# Patient Record
Sex: FEMALE | Race: WHITE | Employment: OTHER | URBAN - METROPOLITAN AREA
[De-identification: names, ages, dates, MRNs, and addresses within clinical notes are randomized per-mention and may not be internally consistent; named-entity substitution may affect disease eponyms.]

---

## 2021-01-01 ENCOUNTER — OFFICE VISIT (OUTPATIENT)
Dept: CARDIOLOGY CLINIC | Age: 71
End: 2021-01-01
Payer: MEDICARE

## 2021-01-01 ENCOUNTER — HOSPITAL ENCOUNTER (INPATIENT)
Age: 71
LOS: 3 days | Discharge: HOME | DRG: 065 | End: 2021-07-07
Attending: EMERGENCY MEDICINE | Admitting: HOSPITALIST
Payer: MEDICARE

## 2021-01-01 ENCOUNTER — APPOINTMENT (OUTPATIENT)
Dept: MRI IMAGING | Age: 71
DRG: 065 | End: 2021-01-01
Payer: MEDICARE

## 2021-01-01 ENCOUNTER — PROCEDURE VISIT (OUTPATIENT)
Dept: CARDIOLOGY CLINIC | Age: 71
End: 2021-01-01

## 2021-01-01 ENCOUNTER — TELEPHONE (OUTPATIENT)
Dept: CARDIOLOGY CLINIC | Age: 71
End: 2021-01-01

## 2021-01-01 ENCOUNTER — APPOINTMENT (OUTPATIENT)
Dept: CT IMAGING | Age: 71
DRG: 065 | End: 2021-01-01
Payer: MEDICARE

## 2021-01-01 ENCOUNTER — APPOINTMENT (OUTPATIENT)
Dept: GENERAL RADIOLOGY | Age: 71
DRG: 065 | End: 2021-01-01
Payer: MEDICARE

## 2021-01-01 ENCOUNTER — HOSPITAL ENCOUNTER (OUTPATIENT)
Age: 71
Discharge: HOME | DRG: 065 | End: 2021-07-02
Payer: MEDICARE

## 2021-01-01 VITALS
DIASTOLIC BLOOD PRESSURE: 68 MMHG | HEIGHT: 67 IN | SYSTOLIC BLOOD PRESSURE: 120 MMHG | WEIGHT: 212 LBS | BODY MASS INDEX: 33.27 KG/M2 | HEART RATE: 61 BPM

## 2021-01-01 VITALS
SYSTOLIC BLOOD PRESSURE: 140 MMHG | HEART RATE: 60 BPM | HEIGHT: 67 IN | DIASTOLIC BLOOD PRESSURE: 84 MMHG | WEIGHT: 212.6 LBS | BODY MASS INDEX: 33.37 KG/M2

## 2021-01-01 VITALS
DIASTOLIC BLOOD PRESSURE: 72 MMHG | WEIGHT: 219.7 LBS | BODY MASS INDEX: 34.48 KG/M2 | OXYGEN SATURATION: 97 % | SYSTOLIC BLOOD PRESSURE: 166 MMHG | RESPIRATION RATE: 16 BRPM | HEART RATE: 60 BPM | TEMPERATURE: 98.4 F | HEIGHT: 67 IN

## 2021-01-01 DIAGNOSIS — I10 ESSENTIAL HYPERTENSION: ICD-10-CM

## 2021-01-01 DIAGNOSIS — I48.19 PERSISTENT ATRIAL FIBRILLATION (HCC): Primary | ICD-10-CM

## 2021-01-01 DIAGNOSIS — I47.1 ATRIOVENTRICULAR NODAL RE-ENTRY TACHYCARDIA (HCC): ICD-10-CM

## 2021-01-01 DIAGNOSIS — I34.1 MITRAL VALVE PROLAPSE: ICD-10-CM

## 2021-01-01 DIAGNOSIS — Z79.01 ANTICOAGULATED ON COUMADIN: ICD-10-CM

## 2021-01-01 DIAGNOSIS — Z95.2 HISTORY OF MITRAL VALVE PROSTHESIS: ICD-10-CM

## 2021-01-01 DIAGNOSIS — I38 VHD (VALVULAR HEART DISEASE): ICD-10-CM

## 2021-01-01 DIAGNOSIS — Z98.890 H/O PRIOR ABLATION TREATMENT: ICD-10-CM

## 2021-01-01 DIAGNOSIS — Z86.73 H/O: CVA (CEREBROVASCULAR ACCIDENT): ICD-10-CM

## 2021-01-01 DIAGNOSIS — Z01.818 PRE-OP EXAMINATION: Primary | ICD-10-CM

## 2021-01-01 DIAGNOSIS — R47.81 SLURRED SPEECH: Primary | ICD-10-CM

## 2021-01-01 DIAGNOSIS — I63.9 ACUTE CVA (CEREBROVASCULAR ACCIDENT) (HCC): ICD-10-CM

## 2021-01-01 DIAGNOSIS — Z01.818 PRE-OP EXAMINATION: ICD-10-CM

## 2021-01-01 DIAGNOSIS — I48.92 ATRIAL FIBRILLATION AND FLUTTER (HCC): ICD-10-CM

## 2021-01-01 DIAGNOSIS — I48.20 CHRONIC ATRIAL FIBRILLATION (HCC): ICD-10-CM

## 2021-01-01 DIAGNOSIS — Z95.2 HISTORY OF MITRAL VALVE PROSTHESIS: Primary | ICD-10-CM

## 2021-01-01 DIAGNOSIS — I48.91 ATRIAL FIBRILLATION AND FLUTTER (HCC): ICD-10-CM

## 2021-01-01 LAB
ALBUMIN SERPL-MCNC: 3.2 GM/DL (ref 3.4–5)
ALP BLD-CCNC: 63 IU/L (ref 40–129)
ALT SERPL-CCNC: 8 U/L (ref 10–40)
ANION GAP SERPL CALCULATED.3IONS-SCNC: 7 MMOL/L (ref 4–16)
ANION GAP SERPL CALCULATED.3IONS-SCNC: 7 MMOL/L (ref 4–16)
AST SERPL-CCNC: 17 IU/L (ref 15–37)
BACTERIA: ABNORMAL /HPF
BASOPHILS ABSOLUTE: 0.1 K/CU MM
BASOPHILS RELATIVE PERCENT: 0.8 % (ref 0–1)
BILIRUB SERPL-MCNC: 0.3 MG/DL (ref 0–1)
BILIRUBIN URINE: NEGATIVE MG/DL
BLOOD, URINE: ABNORMAL
BUN BLDV-MCNC: 11 MG/DL (ref 6–23)
BUN BLDV-MCNC: 7 MG/DL (ref 6–23)
CALCIUM SERPL-MCNC: 8.6 MG/DL (ref 8.3–10.6)
CALCIUM SERPL-MCNC: 9 MG/DL (ref 8.3–10.6)
CHLORIDE BLD-SCNC: 103 MMOL/L (ref 99–110)
CHLORIDE BLD-SCNC: 105 MMOL/L (ref 99–110)
CHOLESTEROL: 172 MG/DL
CHP ED QC CHECK: NORMAL
CLARITY: CLEAR
CO2: 26 MMOL/L (ref 21–32)
CO2: 28 MMOL/L (ref 21–32)
COLOR: YELLOW
CREAT SERPL-MCNC: 0.6 MG/DL (ref 0.6–1.1)
CREAT SERPL-MCNC: 0.7 MG/DL (ref 0.6–1.1)
DIFFERENTIAL TYPE: ABNORMAL
EKG ATRIAL RATE: 45 BPM
EKG DIAGNOSIS: NORMAL
EKG Q-T INTERVAL: 434 MS
EKG QRS DURATION: 102 MS
EKG QTC CALCULATION (BAZETT): 440 MS
EKG R AXIS: 44 DEGREES
EKG T AXIS: 183 DEGREES
EKG VENTRICULAR RATE: 62 BPM
EOSINOPHILS ABSOLUTE: 0.2 K/CU MM
EOSINOPHILS RELATIVE PERCENT: 3.3 % (ref 0–3)
ESTIMATED AVERAGE GLUCOSE: 137 MG/DL
GFR AFRICAN AMERICAN: >60 ML/MIN/1.73M2
GFR AFRICAN AMERICAN: >60 ML/MIN/1.73M2
GFR NON-AFRICAN AMERICAN: >60 ML/MIN/1.73M2
GFR NON-AFRICAN AMERICAN: >60 ML/MIN/1.73M2
GLUCOSE BLD-MCNC: 115 MG/DL (ref 70–99)
GLUCOSE BLD-MCNC: 119 MG/DL (ref 70–99)
GLUCOSE BLD-MCNC: 123 MG/DL
GLUCOSE BLD-MCNC: 123 MG/DL (ref 70–99)
GLUCOSE, URINE: NEGATIVE MG/DL
HBA1C MFR BLD: 6.4 % (ref 4.2–6.3)
HCT VFR BLD CALC: 33.8 % (ref 37–47)
HCT VFR BLD CALC: 34.8 % (ref 37–47)
HDLC SERPL-MCNC: 51 MG/DL
HEMOGLOBIN: 10.8 GM/DL (ref 12.5–16)
HEMOGLOBIN: 11.2 GM/DL (ref 12.5–16)
IMMATURE NEUTROPHIL %: 0.6 % (ref 0–0.43)
INR BLD: 0.95 INDEX
INR BLD: 1.08 INDEX
INR BLD: 1.15 INDEX
INR BLD: 1.46 INDEX
INR BLD: 1.54 INDEX
KETONES, URINE: NEGATIVE MG/DL
LDL CHOLESTEROL DIRECT: 110 MG/DL
LEUKOCYTE ESTERASE, URINE: ABNORMAL
LV EF: 53 %
LVEF MODALITY: NORMAL
LYMPHOCYTES ABSOLUTE: 1.7 K/CU MM
LYMPHOCYTES RELATIVE PERCENT: 23.3 % (ref 24–44)
MCH RBC QN AUTO: 28.1 PG (ref 27–31)
MCH RBC QN AUTO: 28.6 PG (ref 27–31)
MCHC RBC AUTO-ENTMCNC: 32 % (ref 32–36)
MCHC RBC AUTO-ENTMCNC: 32.2 % (ref 32–36)
MCV RBC AUTO: 87.4 FL (ref 78–100)
MCV RBC AUTO: 89.4 FL (ref 78–100)
MONOCYTES ABSOLUTE: 0.8 K/CU MM
MONOCYTES RELATIVE PERCENT: 11.3 % (ref 0–4)
NITRITE URINE, QUANTITATIVE: NEGATIVE
NUCLEATED RBC %: 0 %
PDW BLD-RTO: 14.3 % (ref 11.7–14.9)
PDW BLD-RTO: 14.4 % (ref 11.7–14.9)
PH, URINE: 8 (ref 5–8)
PLATELET # BLD: 201 K/CU MM (ref 140–440)
PLATELET # BLD: 205 K/CU MM (ref 140–440)
PMV BLD AUTO: 9.2 FL (ref 7.5–11.1)
PMV BLD AUTO: 9.4 FL (ref 7.5–11.1)
POTASSIUM SERPL-SCNC: 3.6 MMOL/L (ref 3.5–5.1)
POTASSIUM SERPL-SCNC: 3.7 MMOL/L (ref 3.5–5.1)
PROTEIN UA: NEGATIVE MG/DL
PROTHROMBIN TIME: 12.2 SECONDS (ref 11.7–14.5)
PROTHROMBIN TIME: 13.9 SECONDS (ref 11.7–14.5)
PROTHROMBIN TIME: 14.9 SECONDS (ref 11.7–14.5)
PROTHROMBIN TIME: 18.9 SECONDS (ref 11.7–14.5)
PROTHROMBIN TIME: 20 SECONDS (ref 11.7–14.5)
RBC # BLD: 3.78 M/CU MM (ref 4.2–5.4)
RBC # BLD: 3.98 M/CU MM (ref 4.2–5.4)
RBC URINE: 8 /HPF (ref 0–6)
SEGMENTED NEUTROPHILS ABSOLUTE COUNT: 4.4 K/CU MM
SEGMENTED NEUTROPHILS RELATIVE PERCENT: 60.7 % (ref 36–66)
SODIUM BLD-SCNC: 136 MMOL/L (ref 135–145)
SODIUM BLD-SCNC: 140 MMOL/L (ref 135–145)
SPECIFIC GRAVITY UA: 1 (ref 1–1.03)
SQUAMOUS EPITHELIAL: 3 /HPF
TOTAL IMMATURE NEUTOROPHIL: 0.04 K/CU MM
TOTAL NUCLEATED RBC: 0 K/CU MM
TOTAL PROTEIN: 5.7 GM/DL (ref 6.4–8.2)
TRANSITIONAL EPITHELIAL: <1 /HPF
TRICHOMONAS: ABNORMAL /HPF
TRIGL SERPL-MCNC: 109 MG/DL
TROPONIN T: <0.01 NG/ML
UROBILINOGEN, URINE: NEGATIVE MG/DL (ref 0.2–1)
WBC # BLD: 7.2 K/CU MM (ref 4–10.5)
WBC # BLD: 7.2 K/CU MM (ref 4–10.5)
WBC UA: 4 /HPF (ref 0–5)

## 2021-01-01 PROCEDURE — 6370000000 HC RX 637 (ALT 250 FOR IP): Performed by: HOSPITALIST

## 2021-01-01 PROCEDURE — 6370000000 HC RX 637 (ALT 250 FOR IP): Performed by: INTERNAL MEDICINE

## 2021-01-01 PROCEDURE — 70551 MRI BRAIN STEM W/O DYE: CPT

## 2021-01-01 PROCEDURE — 70498 CT ANGIOGRAPHY NECK: CPT

## 2021-01-01 PROCEDURE — 2580000003 HC RX 258: Performed by: HOSPITALIST

## 2021-01-01 PROCEDURE — 1200000000 HC SEMI PRIVATE

## 2021-01-01 PROCEDURE — 2580000003 HC RX 258: Performed by: EMERGENCY MEDICINE

## 2021-01-01 PROCEDURE — 99223 1ST HOSP IP/OBS HIGH 75: CPT | Performed by: INTERNAL MEDICINE

## 2021-01-01 PROCEDURE — 36415 COLL VENOUS BLD VENIPUNCTURE: CPT

## 2021-01-01 PROCEDURE — 92610 EVALUATE SWALLOWING FUNCTION: CPT | Performed by: SPEECH-LANGUAGE PATHOLOGIST

## 2021-01-01 PROCEDURE — 97116 GAIT TRAINING THERAPY: CPT

## 2021-01-01 PROCEDURE — 6370000000 HC RX 637 (ALT 250 FOR IP): Performed by: NURSE PRACTITIONER

## 2021-01-01 PROCEDURE — 71045 X-RAY EXAM CHEST 1 VIEW: CPT

## 2021-01-01 PROCEDURE — 80061 LIPID PANEL: CPT

## 2021-01-01 PROCEDURE — 85610 PROTHROMBIN TIME: CPT

## 2021-01-01 PROCEDURE — 84484 ASSAY OF TROPONIN QUANT: CPT

## 2021-01-01 PROCEDURE — 70496 CT ANGIOGRAPHY HEAD: CPT

## 2021-01-01 PROCEDURE — 99233 SBSQ HOSP IP/OBS HIGH 50: CPT | Performed by: INTERNAL MEDICINE

## 2021-01-01 PROCEDURE — 93306 TTE W/DOPPLER COMPLETE: CPT | Performed by: INTERNAL MEDICINE

## 2021-01-01 PROCEDURE — 97530 THERAPEUTIC ACTIVITIES: CPT

## 2021-01-01 PROCEDURE — 97162 PT EVAL MOD COMPLEX 30 MIN: CPT

## 2021-01-01 PROCEDURE — 99223 1ST HOSP IP/OBS HIGH 75: CPT | Performed by: PSYCHIATRY & NEUROLOGY

## 2021-01-01 PROCEDURE — 85025 COMPLETE CBC W/AUTO DIFF WBC: CPT

## 2021-01-01 PROCEDURE — 6360000002 HC RX W HCPCS: Performed by: HOSPITALIST

## 2021-01-01 PROCEDURE — 93308 TTE F-UP OR LMTD: CPT

## 2021-01-01 PROCEDURE — 80048 BASIC METABOLIC PNL TOTAL CA: CPT

## 2021-01-01 PROCEDURE — APPSS45 APP SPLIT SHARED TIME 31-45 MINUTES: Performed by: NURSE PRACTITIONER

## 2021-01-01 PROCEDURE — 85027 COMPLETE CBC AUTOMATED: CPT

## 2021-01-01 PROCEDURE — 80053 COMPREHEN METABOLIC PANEL: CPT

## 2021-01-01 PROCEDURE — 6360000004 HC RX CONTRAST MEDICATION: Performed by: EMERGENCY MEDICINE

## 2021-01-01 PROCEDURE — 99232 SBSQ HOSP IP/OBS MODERATE 35: CPT | Performed by: INTERNAL MEDICINE

## 2021-01-01 PROCEDURE — 99204 OFFICE O/P NEW MOD 45 MIN: CPT | Performed by: INTERNAL MEDICINE

## 2021-01-01 PROCEDURE — 81001 URINALYSIS AUTO W/SCOPE: CPT

## 2021-01-01 PROCEDURE — 94761 N-INVAS EAR/PLS OXIMETRY MLT: CPT

## 2021-01-01 PROCEDURE — 99214 OFFICE O/P EST MOD 30 MIN: CPT | Performed by: INTERNAL MEDICINE

## 2021-01-01 PROCEDURE — 82962 GLUCOSE BLOOD TEST: CPT

## 2021-01-01 PROCEDURE — 97535 SELF CARE MNGMENT TRAINING: CPT

## 2021-01-01 PROCEDURE — 93010 ELECTROCARDIOGRAM REPORT: CPT | Performed by: INTERNAL MEDICINE

## 2021-01-01 PROCEDURE — 83036 HEMOGLOBIN GLYCOSYLATED A1C: CPT

## 2021-01-01 PROCEDURE — 93000 ELECTROCARDIOGRAM COMPLETE: CPT | Performed by: INTERNAL MEDICINE

## 2021-01-01 PROCEDURE — 97165 OT EVAL LOW COMPLEX 30 MIN: CPT

## 2021-01-01 PROCEDURE — 83721 ASSAY OF BLOOD LIPOPROTEIN: CPT

## 2021-01-01 PROCEDURE — 93005 ELECTROCARDIOGRAM TRACING: CPT | Performed by: EMERGENCY MEDICINE

## 2021-01-01 PROCEDURE — 70450 CT HEAD/BRAIN W/O DYE: CPT

## 2021-01-01 PROCEDURE — 99285 EMERGENCY DEPT VISIT HI MDM: CPT

## 2021-01-01 RX ORDER — AMLODIPINE BESYLATE 10 MG/1
10 TABLET ORAL DAILY
Qty: 30 TABLET | Refills: 0 | Status: SHIPPED | OUTPATIENT
Start: 2021-01-01 | End: 2021-01-01 | Stop reason: ALTCHOICE

## 2021-01-01 RX ORDER — ONDANSETRON 2 MG/ML
4 INJECTION INTRAMUSCULAR; INTRAVENOUS EVERY 6 HOURS PRN
Status: DISCONTINUED | OUTPATIENT
Start: 2021-01-01 | End: 2021-01-01 | Stop reason: HOSPADM

## 2021-01-01 RX ORDER — WARFARIN SODIUM 2 MG/1
2 TABLET ORAL DAILY
Status: DISCONTINUED | OUTPATIENT
Start: 2021-01-01 | End: 2021-01-01 | Stop reason: HOSPADM

## 2021-01-01 RX ORDER — ASPIRIN 81 MG/1
81 TABLET, CHEWABLE ORAL DAILY
Status: DISCONTINUED | OUTPATIENT
Start: 2021-01-01 | End: 2021-01-01 | Stop reason: HOSPADM

## 2021-01-01 RX ORDER — SODIUM CHLORIDE 0.9 % (FLUSH) 0.9 %
5-40 SYRINGE (ML) INJECTION PRN
Status: DISCONTINUED | OUTPATIENT
Start: 2021-01-01 | End: 2021-01-01 | Stop reason: HOSPADM

## 2021-01-01 RX ORDER — DOCUSATE SODIUM 100 MG/1
100 CAPSULE, LIQUID FILLED ORAL NIGHTLY
Status: DISCONTINUED | OUTPATIENT
Start: 2021-01-01 | End: 2021-01-01 | Stop reason: HOSPADM

## 2021-01-01 RX ORDER — ONDANSETRON 4 MG/1
4 TABLET, ORALLY DISINTEGRATING ORAL EVERY 8 HOURS PRN
Status: DISCONTINUED | OUTPATIENT
Start: 2021-01-01 | End: 2021-01-01 | Stop reason: HOSPADM

## 2021-01-01 RX ORDER — POLYETHYLENE GLYCOL 3350 17 G/17G
17 POWDER, FOR SOLUTION ORAL DAILY PRN
Status: DISCONTINUED | OUTPATIENT
Start: 2021-01-01 | End: 2021-01-01 | Stop reason: HOSPADM

## 2021-01-01 RX ORDER — HYDROCODONE BITARTRATE AND ACETAMINOPHEN 5; 325 MG/1; MG/1
TABLET ORAL
COMMUNITY
Start: 2021-01-01

## 2021-01-01 RX ORDER — HYDROCHLOROTHIAZIDE 25 MG/1
25 TABLET ORAL DAILY
Qty: 30 TABLET | Refills: 0 | Status: SHIPPED | OUTPATIENT
Start: 2021-01-01 | End: 2021-01-01 | Stop reason: ALTCHOICE

## 2021-01-01 RX ORDER — CITALOPRAM 20 MG/1
20 TABLET ORAL DAILY
Status: DISCONTINUED | OUTPATIENT
Start: 2021-01-01 | End: 2021-01-01 | Stop reason: HOSPADM

## 2021-01-01 RX ORDER — LANOLIN ALCOHOL/MO/W.PET/CERES
2000 CREAM (GRAM) TOPICAL DAILY
Status: DISCONTINUED | OUTPATIENT
Start: 2021-01-01 | End: 2021-01-01 | Stop reason: HOSPADM

## 2021-01-01 RX ORDER — METOPROLOL TARTRATE 50 MG/1
50 TABLET, FILM COATED ORAL 3 TIMES DAILY
Status: DISCONTINUED | OUTPATIENT
Start: 2021-01-01 | End: 2021-01-01 | Stop reason: DRUGHIGH

## 2021-01-01 RX ORDER — SODIUM CHLORIDE 0.9 % (FLUSH) 0.9 %
5-40 SYRINGE (ML) INJECTION EVERY 12 HOURS SCHEDULED
Status: DISCONTINUED | OUTPATIENT
Start: 2021-01-01 | End: 2021-01-01 | Stop reason: HOSPADM

## 2021-01-01 RX ORDER — WARFARIN SODIUM 3 MG/1
3 TABLET ORAL ONCE
Status: COMPLETED | OUTPATIENT
Start: 2021-01-01 | End: 2021-01-01

## 2021-01-01 RX ORDER — OXYCODONE HYDROCHLORIDE AND ACETAMINOPHEN 5; 325 MG/1; MG/1
1 TABLET ORAL EVERY 6 HOURS PRN
Status: DISCONTINUED | OUTPATIENT
Start: 2021-01-01 | End: 2021-01-01 | Stop reason: HOSPADM

## 2021-01-01 RX ORDER — AMLODIPINE BESYLATE 5 MG/1
5 TABLET ORAL DAILY
Qty: 30 TABLET | Refills: 5 | Status: SHIPPED | OUTPATIENT
Start: 2021-01-01

## 2021-01-01 RX ORDER — SODIUM CHLORIDE 9 MG/ML
25 INJECTION, SOLUTION INTRAVENOUS PRN
Status: DISCONTINUED | OUTPATIENT
Start: 2021-01-01 | End: 2021-01-01 | Stop reason: HOSPADM

## 2021-01-01 RX ORDER — LABETALOL HYDROCHLORIDE 5 MG/ML
10 INJECTION, SOLUTION INTRAVENOUS EVERY 10 MIN PRN
Status: DISCONTINUED | OUTPATIENT
Start: 2021-01-01 | End: 2021-01-01 | Stop reason: HOSPADM

## 2021-01-01 RX ORDER — ACETAMINOPHEN 325 MG/1
650 TABLET ORAL EVERY 4 HOURS PRN
Status: DISCONTINUED | OUTPATIENT
Start: 2021-01-01 | End: 2021-01-01 | Stop reason: HOSPADM

## 2021-01-01 RX ORDER — 0.9 % SODIUM CHLORIDE 0.9 %
10 VIAL (ML) INJECTION
Status: COMPLETED | OUTPATIENT
Start: 2021-01-01 | End: 2021-01-01

## 2021-01-01 RX ORDER — METOPROLOL TARTRATE 50 MG/1
50 TABLET, FILM COATED ORAL 2 TIMES DAILY
Qty: 60 TABLET | Refills: 3 | Status: SHIPPED | OUTPATIENT
Start: 2021-01-01

## 2021-01-01 RX ORDER — HYDROCODONE BITARTRATE AND ACETAMINOPHEN 5; 325 MG/1; MG/1
1 TABLET ORAL EVERY 6 HOURS PRN
Status: DISCONTINUED | OUTPATIENT
Start: 2021-01-01 | End: 2021-01-01

## 2021-01-01 RX ORDER — HYDROCHLOROTHIAZIDE 25 MG/1
25 TABLET ORAL DAILY
Status: DISCONTINUED | OUTPATIENT
Start: 2021-01-01 | End: 2021-01-01 | Stop reason: HOSPADM

## 2021-01-01 RX ADMIN — SODIUM CHLORIDE, PRESERVATIVE FREE 10 ML: 5 INJECTION INTRAVENOUS at 08:56

## 2021-01-01 RX ADMIN — ENOXAPARIN SODIUM 100 MG: 100 INJECTION SUBCUTANEOUS at 21:03

## 2021-01-01 RX ADMIN — SODIUM CHLORIDE, PRESERVATIVE FREE 10 ML: 5 INJECTION INTRAVENOUS at 08:45

## 2021-01-01 RX ADMIN — OXYCODONE HYDROCHLORIDE AND ACETAMINOPHEN 1 TABLET: 5; 325 TABLET ORAL at 03:06

## 2021-01-01 RX ADMIN — OXYCODONE HYDROCHLORIDE AND ACETAMINOPHEN 1 TABLET: 5; 325 TABLET ORAL at 21:03

## 2021-01-01 RX ADMIN — ENOXAPARIN SODIUM 100 MG: 100 INJECTION SUBCUTANEOUS at 20:54

## 2021-01-01 RX ADMIN — METOPROLOL TARTRATE 75 MG: 25 TABLET, FILM COATED ORAL at 12:39

## 2021-01-01 RX ADMIN — ENOXAPARIN SODIUM 100 MG: 100 INJECTION SUBCUTANEOUS at 08:54

## 2021-01-01 RX ADMIN — METOPROLOL TARTRATE 75 MG: 25 TABLET, FILM COATED ORAL at 21:03

## 2021-01-01 RX ADMIN — CYANOCOBALAMIN TAB 1000 MCG 2000 MCG: 1000 TAB at 09:12

## 2021-01-01 RX ADMIN — SODIUM CHLORIDE, PRESERVATIVE FREE 10 ML: 5 INJECTION INTRAVENOUS at 09:13

## 2021-01-01 RX ADMIN — DOCUSATE SODIUM 100 MG: 100 CAPSULE, LIQUID FILLED ORAL at 21:03

## 2021-01-01 RX ADMIN — CITALOPRAM HYDROBROMIDE 20 MG: 20 TABLET ORAL at 08:53

## 2021-01-01 RX ADMIN — ENOXAPARIN SODIUM 100 MG: 100 INJECTION SUBCUTANEOUS at 20:19

## 2021-01-01 RX ADMIN — CYANOCOBALAMIN TAB 1000 MCG 2000 MCG: 1000 TAB at 08:53

## 2021-01-01 RX ADMIN — OXYCODONE HYDROCHLORIDE AND ACETAMINOPHEN 1 TABLET: 5; 325 TABLET ORAL at 08:42

## 2021-01-01 RX ADMIN — HYDROCHLOROTHIAZIDE 25 MG: 25 TABLET ORAL at 09:12

## 2021-01-01 RX ADMIN — OXYCODONE HYDROCHLORIDE AND ACETAMINOPHEN 1 TABLET: 5; 325 TABLET ORAL at 00:19

## 2021-01-01 RX ADMIN — WARFARIN SODIUM 2 MG: 2 TABLET ORAL at 18:34

## 2021-01-01 RX ADMIN — SODIUM CHLORIDE, PRESERVATIVE FREE 10 ML: 5 INJECTION INTRAVENOUS at 20:55

## 2021-01-01 RX ADMIN — ASPIRIN 81 MG: 81 TABLET, CHEWABLE ORAL at 08:53

## 2021-01-01 RX ADMIN — HYDROCHLOROTHIAZIDE 25 MG: 25 TABLET ORAL at 08:57

## 2021-01-01 RX ADMIN — SODIUM CHLORIDE, PRESERVATIVE FREE 10 ML: 5 INJECTION INTRAVENOUS at 20:20

## 2021-01-01 RX ADMIN — IOPAMIDOL 75 ML: 755 INJECTION, SOLUTION INTRAVENOUS at 16:19

## 2021-01-01 RX ADMIN — ASPIRIN 81 MG: 81 TABLET, CHEWABLE ORAL at 08:34

## 2021-01-01 RX ADMIN — SODIUM CHLORIDE, PRESERVATIVE FREE 10 ML: 5 INJECTION INTRAVENOUS at 21:03

## 2021-01-01 RX ADMIN — DOCUSATE SODIUM 100 MG: 100 CAPSULE, LIQUID FILLED ORAL at 20:19

## 2021-01-01 RX ADMIN — ENOXAPARIN SODIUM 100 MG: 100 INJECTION SUBCUTANEOUS at 09:12

## 2021-01-01 RX ADMIN — OXYCODONE HYDROCHLORIDE AND ACETAMINOPHEN 1 TABLET: 5; 325 TABLET ORAL at 20:50

## 2021-01-01 RX ADMIN — CEFTRIAXONE 1000 MG: 1 INJECTION, POWDER, FOR SOLUTION INTRAMUSCULAR; INTRAVENOUS at 12:39

## 2021-01-01 RX ADMIN — WARFARIN SODIUM 3 MG: 3 TABLET ORAL at 20:50

## 2021-01-01 RX ADMIN — Medication 10 ML: at 16:19

## 2021-01-01 RX ADMIN — CITALOPRAM HYDROBROMIDE 20 MG: 20 TABLET ORAL at 08:35

## 2021-01-01 RX ADMIN — ACETAMINOPHEN 650 MG: 325 TABLET ORAL at 13:53

## 2021-01-01 RX ADMIN — CITALOPRAM HYDROBROMIDE 20 MG: 20 TABLET ORAL at 09:12

## 2021-01-01 RX ADMIN — CYANOCOBALAMIN TAB 1000 MCG 2000 MCG: 1000 TAB at 08:42

## 2021-01-01 RX ADMIN — METOPROLOL TARTRATE 75 MG: 25 TABLET, FILM COATED ORAL at 20:19

## 2021-01-01 RX ADMIN — ENOXAPARIN SODIUM 100 MG: 100 INJECTION SUBCUTANEOUS at 08:35

## 2021-01-01 RX ADMIN — CEFTRIAXONE 1000 MG: 1 INJECTION, POWDER, FOR SOLUTION INTRAMUSCULAR; INTRAVENOUS at 11:56

## 2021-01-01 RX ADMIN — WARFARIN SODIUM 2 MG: 2 TABLET ORAL at 17:47

## 2021-01-01 RX ADMIN — DOCUSATE SODIUM 100 MG: 100 CAPSULE, LIQUID FILLED ORAL at 20:51

## 2021-01-01 RX ADMIN — OXYCODONE HYDROCHLORIDE AND ACETAMINOPHEN 1 TABLET: 5; 325 TABLET ORAL at 08:53

## 2021-01-01 RX ADMIN — ASPIRIN 81 MG: 81 TABLET, CHEWABLE ORAL at 09:12

## 2021-01-01 RX ADMIN — OXYCODONE HYDROCHLORIDE AND ACETAMINOPHEN 1 TABLET: 5; 325 TABLET ORAL at 03:09

## 2021-01-01 ASSESSMENT — PAIN DESCRIPTION - ORIENTATION
ORIENTATION: LEFT
ORIENTATION: LEFT

## 2021-01-01 ASSESSMENT — PAIN SCALES - GENERAL
PAINLEVEL_OUTOF10: 4
PAINLEVEL_OUTOF10: 3
PAINLEVEL_OUTOF10: 5
PAINLEVEL_OUTOF10: 4
PAINLEVEL_OUTOF10: 3
PAINLEVEL_OUTOF10: 3
PAINLEVEL_OUTOF10: 7
PAINLEVEL_OUTOF10: 4

## 2021-01-01 ASSESSMENT — PAIN DESCRIPTION - LOCATION
LOCATION: ARM

## 2021-01-01 ASSESSMENT — PAIN DESCRIPTION - PAIN TYPE
TYPE: ACUTE PAIN

## 2021-01-01 ASSESSMENT — PAIN DESCRIPTION - DESCRIPTORS: DESCRIPTORS: ACHING;CONSTANT

## 2021-01-01 ASSESSMENT — PAIN DESCRIPTION - FREQUENCY: FREQUENCY: INTERMITTENT

## 2021-06-17 NOTE — TELEPHONE ENCOUNTER
Patient hasn't been seen since 2015. Angelia Yo offered her an appt, the patient declined. I called Dr Gonzalez Challenger office, talked to Julia and informed her the patients decline for appt.

## 2021-06-21 NOTE — TELEPHONE ENCOUNTER
Cardiologist: Dr. Anu Rivera  Surgeon: Dr. Emigdio Joe  Surgery: Open reduction internal fixation of Left wrist  Anesthesia: General  Date: 6/23/21  FAX# [phone]  Ph# [phone]    Last OV 2015 w/Mphammed      New Pt appt- 6/22/21  w/Dr Anu Rivera

## 2021-06-22 NOTE — TELEPHONE ENCOUNTER
Hold coumadin starting Thursday 6/24. Start Lovenox 1mg/kg BID on 6/25. Restart Coumadin after surgery continuing Lovenox for 2 days at which time check INR.  If INR > than 2 may stop Lovenox, if not continue both until INR > 2 then stop Lovenox

## 2021-06-22 NOTE — LETTER
[address]   [address]  1950  [id number]    Have you had any Chest Pain that is not new? - No    Have you had any Shortness of Breath - No      Have you had any dizziness - No    Have you had any palpitations that are not new? - No    Is the patient on any of the following medications -    If Yes DO EKG - Needs done every 6 months    Do you have any edema - swelling in legs, ankle and feet    If Yes - CHECK TO SEE IF THE EDEMA IS PITTING  How long have they been having edema - Years  If Yes - Have they worn compression stockings No  If they have worn compression stockings        Vein \"LEG PROBLEM Questionnaire\"  1. Do you have prominent leg veins? No   2. Do you have any skin discoloration? No  3. Do you have any healed or active sores? No  4. Do you have swelling of the legs? Yes  5. Do you have a family history of varicose veins? No  6. Does your profession involve pro-longed        standing or heavy lifting? No  7. Have you been fighting overweight problems? Yes  8. Do you have restless legs? No  9. Do you have any night time cramps? No  10. Do you have any of the following in your legs:              Do you have a surgery or procedure scheduled in the near future - Yes  If Yes- DO EKG  If Yes - Who is the surgery with?    Phone number of physician   Fax number of physician   Type of surgery Left wrist   GIVE THIS INFORMATION TO NATIVIDAD CARLOS     Ask patient [facility] if [facility] are not already [facility]    [facility] to see if we have an E-MAIL on file for the patient     Check medication list thoroughly!!! AND RECONCILE OUTSIDE MEDICATIONS  If dose has changed change the entire order not just the [place] At check out add to every patient's \"wrap up\" the following dot phrase AFTERHOURSEDUCATION and ensure we explain this to our patients
angiographic disease. Left circumflex  coronary artery consists mostly of a single obtuse marginal branch which is  tortuous, but angiographically without significant atherosclerotic disease. 2.  The right coronary artery, which is a dominant vessel, has luminal  irregularities without any significant angiographic disease. 3.  Resting hemodynamics revealed elevated systemic pressures at rest.  4.  Left ventriculography reveals no significant wall motion abnormalities. Left ventricular systolic function appears to be normal.  Estimated ejection  fraction is greater than 55%. There is no mitral regurgitation. In fact,  the mitral valve prosthesis seems to be seated well, functioning normally. [facility]io Protocol:    CHADS:[id number] Score for Atrial Fibrillation Stroke Risk   Risk   Factors  Component Value   C CHF No 0   H HTN No 0   A2 Age >= 75 No,  (71 y.o.) 0   D DM No 0   S2 Prior Stroke/TIA Yes 2   V Vascular Disease No 0   A Age 74-69 Yes,  (71 y.o.) 1   Sc Sex female 1    [id number]  Score  4   Score last updated 6/22/21 9:49 AM EDT    Click here for a link to the UpToDate guideline \"Atrial Fibrillation: Anticoagulation therapy to prevent embolization    Disclaimer: Risk Score calculation is dependent on accuracy of patient problem list and past encounter diagnosis.

## 2021-06-22 NOTE — PATIENT INSTRUCTIONS
Please be informed that if you contact our office outside of normal business hours the physician on call cannot help with any scheduling or rescheduling issues, procedure instruction questions or any type of medication issue. We advise you for any urgent/emergency that you go to the nearest emergency room! PLEASE CALL OUR OFFICE DURING NORMAL BUSINESS HOURS    Monday - Friday   8 am to 5 pm    [place] [address]: [phone]    [place]:  [phone]  **It is YOUR responsibilty to bring medication bottles and/or updated medication list to [address] [place] [address]. This will allow us to better serve you and all your healthcare needs**  1. PRE-OP assessment: Will check Echo & if no regional WMA she will be considered a low risk surgical candidate. 2. 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 3. Chronic A-fib: Rate controlled on anticoagulation. Office Visit after surgery. Stop Coumadin on Thursday until surgeon restarts. Then will start injection Lovenox until surgeon says.

## 2021-06-22 NOTE — PROGRESS NOTES
CARDIAC CONSULT NOTE       Alin Gutierrez  65 y.o.  female    Chief Complaint   Patient presents with    Atrial Fibrillation    Cardiac Clearance       Referring physician:  Tee Reed MD     Primary care physician:  Tee Reed MD    History of Present Illness:     Alin Gutierrez is a 65 y.o. female referred for Pre-op evaluation. Patient needs to have surgery on her Left wrist.  Patient has known H/O VHD, S/P Metallic prosthesis in the Mitral position. In addition patient also has chronic atrial fibrillation. Patient had had TIA in between her two valve operations. Patient has no C/O chest pain or SOB. has a past medical history of Afib ([address] Anticoagulated on Coumadin, Atrial fibrillation and flutter ([address] Atrioventricular godwin re-entry tachycardia ([address] H/O echocardiogram, H/O prior ablation treatment, H/O rheumatic heart disease, H/O: CVA (cerebrovascular accident), History of mitral valve prosthesis, HTN (hypertension), Hx of cardiovascular stress test, Hx of echocardiogram, Hyperlipemia, Mitral valve prolapse, S/P MVR (mitral valve repair), and VHD (valvular heart disease). has a past surgical history that includes Cardiac surgery; Cardiac valuve replacement; Tubal ligation; and Mitral valve replacement (2/11/98). reports that she has quit smoking. She has never used smokeless tobacco. She reports that she does not drink alcohol and does not use drugs. family history is not on file. Review of Systems:   1. Cardiovascular: No chest pain, dyspnea on exertion, palpitations or loss of consciousness  2. Respiratory: No cough or wheezing    3. Musculoskeletal:  No gait disturbance, weakness, muscle cramps, aches & pains or joint complaints  4. Neurological: No TIA or stroke symptoms  5. Psychiatric: No anxiety or depression  6.  Hematologic/Lymphatic: No bleeding problems, blood clots or swollen lymph nodes    Physical Examination:    /68   Ht 5' 7\" (1.702 m)   Wt 212 lb (96.2 kg)   BMI 33.20 kg/m²    Wt Readings from Last 3 Encounters:   06/22/21 212 lb (96.2 kg)   11/13/15 223 lb (101.2 kg)   10/02/15 224 lb (101.6 kg)     Body mass index is 33.2 kg/m². General Appearance:  Non-obese/Well Nourished  1. Skin: It is warm & dry. No rashes noted. 2. Eyes: No conjunctival Pallor seen. No jaundice noted. 3. Neck: is supple there is no elevation of JVD. No thyromegaly  4. Respiratory:  · Resp Assessment: No abnormal findings. · Resp Auscultation: Vesicular breath sounds without rales or wheezing. 5. Cardiovascular:  · Auscultation: Normal S1 & Metallic S2, 2/6 systolic murmur. · Carotid Arteries: No bruits present  · Abdominal Aorta: Non-palpable  · Pedal Pulses: 2+ and equal   6. Abdomen:  · No masses or tenderness  · Liver/Spleen: No Abnormalities Noted, no organomegaly. 7. Musculoskeletal: No joint deformities. No muscle wasting  8. Extremities:  ·  No Cyanosis or Clubbing. No significant edema   9. Rectal / genital:  ·  Deferred  10.  Neurological/Psychiatric:  · Oriented to time, place, and person  · Non-anxious    No results found for: CKTOTAL, CKMB, CKMBINDEX, TROPONINT  BNP:  No results found for: BNP, PROBNP  PT/INR:  No results found for: PTINR  No results found for: LABA1C  Lab Results   Component Value Date    CHOL 176 10/29/2010    TRIG 193 (H) 10/29/2010    HDL 52 (L) 10/29/2010    LDLDIRECT 113 (H) 10/29/2010     Lab Results   Component Value Date    ALT 11 10/29/2010    AST 19 10/29/2010     BMP:    Lab Results   Component Value Date     10/12/2015     10/29/2010    K 3.9 10/12/2015    K 4.4 10/29/2010     10/12/2015     10/29/2010    CO2 30 10/12/2015    CO2 26 10/29/2010    BUN 11 10/12/2015    BUN 15 10/29/2010    CREATININE 0.8 10/12/2015    CREATININE 0.8 10/29/2010     CMP:    Lab Results   Component Value Date     10/12/2015     10/29/2010    K 3.9 10/12/2015    K 4.4 10/29/2010     10/12/2015     10/29/2010    CO2 30 10/12/2015    CO2 26 10/29/2010    BUN 11 10/12/2015    BUN 15 10/29/2010    CREATININE 0.8 10/12/2015    CREATININE 0.8 10/29/2010    PROT 7.1 10/29/2010     TSH:    Lab Results   Component Value Date    TSH 1.107 10/29/2010     CATH 10/2015  1. Coronary angiography reveals a right-dominant system of vessels. The  left main coronary artery was short, but angiographically normal.  Left  anterior descending artery gives rise to a large-caliber diagonal branch,  both reaching the apex. In fact, the LAD wraps around the apex slightly. Both appear to be without significant angiographic disease. Left circumflex  coronary artery consists mostly of a single obtuse marginal branch which is  tortuous, but angiographically without significant atherosclerotic disease. 2.  The right coronary artery, which is a dominant vessel, has luminal  irregularities without any significant angiographic disease. 3.  Resting hemodynamics revealed elevated systemic pressures at rest.  4.  Left ventriculography reveals no significant wall motion abnormalities. Left ventricular systolic function appears to be normal.  Estimated ejection  fraction is greater than 55%. There is no mitral regurgitation. In fact,  the mitral valve prosthesis seems to be seated well, functioning normally. Echo: NONE > 5 YEARS    EKG: atrial fibrillation, rate 61/min     QUALITY MEASURES REVIEWED:  1.CAD:Patient is taking anti platelet agent:Yes  Patient does not have Hx of documented CAD  2. DYSLIPIDEMIA: Patient is on cholesterol lowering medication:No   3. Beta-Blocker therapy for CAD, if prior Myocardial Infarction:Yes   4. Counselled regarding smoking cessation. No   Patient does not Smoke. 5.Anticoagulation therapy (for A.Fib) Yes. CHADS score is 4   Does Not have A.Fib. Also has Metallic Prosthesis in Mitral Position  6. Discussed weight management strategies.     Assessment, Recommendations & Tests: 1. PRE-OP assessment: Will check Echo & if no regional WMA she will be considered a low risk surgical candidate. 2. 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 3. Chronic A-fib: Rate controlled on anticoagulation. Office Visit after surgery.      Oumar Weiss MD, 6/22/2021 2:28 PM

## 2021-06-23 NOTE — TELEPHONE ENCOUNTER
Left message     Echo:6/22/2021   Left ventricular function is low normal, EF is estimated at 50-55%. Left ventricular size is mildly increased . Mild left ventricular hypertrophy. Diastolic dysfunction could not be evaluated due to MVR. Bi atrial enlargement noted. Aortic valve leaflets are somewhat thickened. Normally functioning mechanical prosthetic valve is present with a mean   gradient of 2mmHg. Mild tricuspid and aortic regurgitation. RVSP is 38 mmHg.    No evidence of pericardial effusion

## 2021-07-04 PROBLEM — I63.9 ACUTE CVA (CEREBROVASCULAR ACCIDENT) (HCC): Status: ACTIVE | Noted: 2021-01-01

## 2021-07-04 NOTE — ED PROVIDER NOTES
Emergency Department Encounter    Patient: Max Haines  MRN: [id number]  : 1950  Date of Evaluation: 2021  ED Provider:  Cathy Fallon MD    Triage Chief Complaint:   Aphasia (Stroke alert)    [place]:  Max Haines is a 65 y.o. female that presents complaint of slurred speech 30 minutes prior to arrival, has a history of a strokes in  after a cardiac surgery. She does have a history of atrial fibrillation and flutter, she is on Coumadin with a bridge of Lovenox. She was supposed to be called by her cardiologist yesterday but had not received information on if she can stop the Coumadin yet. No chest pain. No headache. No lightheadedness or syncope. No dizziness. No vision changes. She denies noticing slurred speech, apparently her  and called 911. She does feel like it is improving. Denies weakness in her extremities, denies numbness. She does have a broken left wrist and it is in a sling. EMS called a stroke alert in the field    ROS - see HPI, below listed is current ROS at time of my eval:  Limited secondary to acuity of condition    Past Medical History:   Diagnosis Date    Afib [facility])     ablation     Anticoagulated on Coumadin     **PCP follows pt's PT/INRs, along w/prescribing her Coumadin. **    Atrial fibrillation and flutter ([address] 2005    On Coumadin.  Atrioventricular godwin re-entry tachycardia ([address]     H/O echocardiogram 10/26/2006    EF 45-50%. Mod to sev septal hypokinesis. Bi-leaflet ([facility]), mechanical prosthesis.  H/O prior ablation treatment 1997    Dr. Barrington Staley H/O rheumatic heart disease     H/O: CVA (cerebrovascular accident)     History of mitral valve prosthesis     Metallic medtronic valve    HTN (hypertension)     Hx of cardiovascular stress test 2015    lexiscan-mild to moderate ischemia basal inferior and mid inferior,EF56%    Hx of echocardiogram 2015    EF. 45-50%. Moderate left atrial enlargement.  Marital Status:    Intimate Partner Violence:     Fear of Current or Ex-Partner:     Emotionally Abused:     Physically Abused:     Sexually Abused:      Current Facility-Administered Medications   Medication Dose Route Frequency Provider Last Rate Last Admin    sodium chloride flush 0.9 % injection 5-40 mL  5-40 mL Intravenous 2 times per day Sujit Gibbons MD        sodium chloride flush 0.9 % injection 5-40 mL  5-40 mL Intravenous PRN Sujit Gibbons MD        0.9 % sodium chloride infusion  25 mL Intravenous PRN Sujit Gibbons MD         Current Outpatient Medications   Medication Sig Dispense Refill    HYDROcodone-acetaminophen (NORCO) 5-325 MG per tablet TAKE 1 TABLET BY MOUTH EVERY SIX HOURS AS NEEDED      enoxaparin (LOVENOX) 100 MG/ML injection Inject 1 mL into the skin 2 times daily 20 Syringe 0    Cetirizine HCl 10 MG CAPS Take 1 tablet by mouth daily      Cyanocobalamin (VITAMIN B-12) 2000 MCG TBCR Take 1 tablet by mouth daily      Vitamin D (CHOLECALCIFEROL) 1000 UNITS CAPS capsule Take 1,000 Units by mouth daily      aspirin 81 MG tablet Take 81 mg by mouth daily      docusate sodium (COLACE) 100 MG capsule Take 100 mg by mouth nightly      metoprolol (LOPRESSOR) 50 MG tablet Take 50 mg by mouth 3 times daily      warfarin (COUMADIN) 2 MG tablet Take 2 mg by mouth daily Indications: **PCP followings pt's PT/INRs, along w/prescribing her Coumadin. **       metoprolol (LOPRESSOR) 25 MG tablet   Take 25 mg by mouth 3 times daily       citalopram (CELEXA) 40 MG tablet Take 20 mg by mouth daily        Allergies   Allergen Reactions    Adenosine     Pravachol [Pravastatin Sodium] Other (See Comments)     Muscle aches, stomach ache    Statins Other (See Comments)     Causes muscle aches.        Nursing Notes Reviewed    Physical Exam:  ED Triage Vitals   Enc Vitals Group      BP 07/04/21 1612 (!) 172/58      Pulse 07/04/21 1607 65      Resp 07/04/21 1607 15      Temp 07/04/21 1607 98.9 °F (37.2 °C)      Temp Source 07/04/21 1607 Oral      SpO2 07/04/21 1607 100 %      Weight 07/04/21 1600 212 lb (96.2 kg)      Height 07/04/21 1600 5' 7\" (1.702 m)      Head Circumference --       Peak Flow --       Pain Score --       Pain Loc --       Pain Edu? --       Excl. in [address]? --           My pulse ox interpretation is - normal    General appearance:  No acute distress. Skin:  Warm. Dry. No rash. Eye:  Extraocular movements intact. Ears, nose, mouth and throat:  Oral mucosa moist   Neck:  Trachea midline. Extremity:  No swelling. Normal ROM     Heart:  Regular rate and rhythm, normal S1 & S2, no extra heart sounds. Perfusion:  intact   Respiratory:  Lungs clear to auscultation bilaterally. Respirations nonlabored. Abdominal:  Normal bowel sounds. Soft. Nontender. Non distended. Neurological:      Mental Status Exam:   Alert and oriented times three, follows commands, speech and language intact    Cranial Chduam-IF-QAX Intact.     Cranial nerve II  Visual acuity: normal  Cranial nerve III  Pupils: equal, round, reactive to light  Cranial nerves III, IV, VI  Extraocular Movements: intact  Cranial nerve V  Facial sensation: intact  Cranial nerve VII  Facial strength: intact  Cranial nerve VIII  Hearing: intact  Cranial nerve IX  Palate: intact  Cranial nerve XI  Shoulder shrug: intact  Cranial nerve XII  Tongue movement: normal    Motor:   Drift: absent  Motor exam is symmetrical 5 out of 5 all extremities bilaterally  Tone: normal  Abnormal Movements: Absent    DTRs- intact bilaterally and symmetrical.  Toes-downgoing bilaterally  Sensory:  Light Touch  Right Upper Extremity: normal  Left Upper Extremity: normal  Right Lower Extremity: normal  Left Lower Extremity: normal      I have reviewed and interpreted all of the currently available lab results from this visit (if applicable):  Results for orders placed or performed during the hospital encounter of 07/04/21   CBC Auto Differential   Result Value Ref Range    WBC 7.2 4.0 - 10.5 K/CU MM    RBC 3.78 (L) 4.2 - 5.4 M/CU MM    Hemoglobin 10.8 (L) 12.5 - 16.0 GM/DL    Hematocrit 33.8 (L) 37 - 47 %    MCV 89.4 78 - 100 FL    MCH 28.6 27 - 31 PG    MCHC 32.0 32.0 - 36.0 %    RDW 14.4 11.7 - 14.9 %    Platelets 282 009 - 392 K/CU MM    MPV 9.2 7.5 - 11.1 FL    Differential Type AUTOMATED DIFFERENTIAL     Segs Relative 60.7 36 - 66 %    Lymphocytes % 23.3 (L) 24 - 44 %    Monocytes % 11.3 (H) 0 - 4 %    Eosinophils % 3.3 (H) 0 - 3 %    Basophils % 0.8 0 - 1 %    Segs Absolute 4.4 K/CU MM    Lymphocytes Absolute 1.7 K/CU MM    Monocytes Absolute 0.8 K/CU MM    Eosinophils Absolute 0.2 K/CU MM    Basophils Absolute 0.1 K/CU MM    Nucleated RBC % 0.0 %    Total Nucleated RBC 0.0 K/CU MM    Total Immature Neutrophil 0.04 K/CU MM    Immature Neutrophil % 0.6 (H) 0 - 0.43 %   Comprehensive Metabolic Panel w/ Reflex to MG   Result Value Ref Range    Sodium 136 135 - 145 MMOL/L    Potassium 3.7 3.5 - 5.1 MMOL/L    Chloride 103 99 - 110 mMol/L    CO2 26 21 - 32 MMOL/L    BUN 11 6 - 23 MG/DL    CREATININE 0.7 0.6 - 1.1 MG/DL    Glucose 115 (H) 70 - 99 MG/DL    Calcium 8.6 8.3 - 10.6 MG/DL    Albumin 3.2 (L) 3.4 - 5.0 GM/DL    Total Protein 5.7 (L) 6.4 - 8.2 GM/DL    Total Bilirubin 0.3 0.0 - 1.0 MG/DL    ALT 8 (L) 10 - 40 U/L    AST 17 15 - 37 IU/L    Alkaline Phosphatase 63 40 - 129 IU/L    GFR Non-African American >60 >60 mL/min/1.73m2    GFR African American >60 >60 mL/min/1.73m2    Anion Gap 7 4 - 16   Troponin   Result Value Ref Range    Troponin T <0.010 <0.01 NG/ML   Protime-INR   Result Value Ref Range    Protime 13.9 11.7 - 14.5 SECONDS    INR 1.08 INDEX   POCT Glucose   Result Value Ref Range    Glucose 123 mg/dL    QC OK? y    POCT Glucose   Result Value Ref Range    POC Glucose 123 (H) 70 - 99 MG/DL   EKG 12 Lead   Result Value Ref Range    Ventricular Rate 62 BPM    Atrial Rate 45 BPM    QRS Duration 102 ms    Q-T Interval 434 ms    QTc Calculation (Bazett) 440 ms    R Axis 44 degrees    T Axis 183 degrees    Diagnosis       Atrial fibrillation with premature ventricular or aberrantly conducted complexes  Septal infarct , age undetermined  ST & T wave abnormality, consider lateral ischemia  Abnormal ECG  No previous ECGs available        Radiographs (if obtained):  Radiologist's Report Reviewed:  No results found. EKG (if obtained): (All EKG's are interpreted by myself in the absence of a cardiologist)  Atrial fibrillation with PVCs, rate of 62 bpm.  Nonspecific ST and T wave changes. No previous to compare. Stroke alert timing details    1. Physician evaluation performed at: On arrival in EMS bay, taken straight to CT as airway is protected  2. Stroke alert called at:  PTA by EMS  3.  CT results obtained at: 1603  4. Decision to administer tPA at:  N/A, symptoms too minor, patient on anticoagulants. not a candidate        MDM:  58-year-old female presented as a stroke alert as above. She is on Coumadin and Lovenox, 30-minute timeframe from arrival and so went immediately for CT when I evaluated her and airway was intact. Was evaluated by Dr. Irwin Cochran on telestroke after CT head was negative and I had  back in the room. Her slurred speech has been improving, he is not sure if she is quite back at normal but is better than previous. She denies any other symptoms. Dr. Irwin Cochran evaluated her on the [facility] telestroke machine. Her symptoms are so minor that she would not be a candidate for TPA, CTAs are negative and so plan will be for admission here for MRI and rule out stroke. Patient and family agreeable. She remained stable at this time      Total critical care time today provided was 35minutes. This excludes seperately billable procedure. Critical care time provided for stroke alert that required close evaluation and/or intervention with concern for patient decompensation. Clinical Impression:  1.  Slurred speech      Disposition referral (if applicable):  No follow-up provider specified. Disposition medications (if applicable):  New Prescriptions    No medications on file     ED Provider Disposition Time  DISPOSITION Decision To Admit 07/04/2021 05:10:01 PM      Comment: Please note this report has been produced using speech recognition software and may contain errors related to that system including errors in grammar, punctuation, and spelling, as well as words and phrases that may be inappropriate. Efforts were made to edit the dictations.         Juanjose Rutledge MD  07/04/21 [id number]

## 2021-07-04 NOTE — ED NOTES
Report called to [facility], states will have RN come down for handoff       Author DEANDRE Barakat  07/04/21 [id number]

## 2021-07-04 NOTE — H&P
Department of Internal Medicine  [facility]  Attending History and Physical      CHIEF COMPLAINT:  Left facial droop    Reason for Admission:  CVA    History Obtained From:  patient    HISTORY OF PRESENT ILLNESS:      The patient is a 65 y.o. female with significant past medical history of Afib, MVR on coumadin and hx of CVA 1997 with no residual deficits. She presents today as family noticed that she had left facial droop with no slurred speech. She was on lovenox bridge with coumadin and yesterday she did not take her lovenox and just took her coumadin. She has left arm bandaged as recently had wrist surgery. No headaches or numbness. Has some constipation. Past Medical History:        Diagnosis Date    Afib [facility])     ablation 1997    Anticoagulated on Coumadin     **PCP follows pt's PT/INRs, along w/prescribing her Coumadin. **    Atrial fibrillation and flutter ([address] 01/01/2005    On Coumadin.  Atrioventricular godwin re-entry tachycardia ([address]     H/O echocardiogram 10/26/2006    EF 45-50%. Mod to sev septal hypokinesis. Bi-leaflet ([facility]), mechanical prosthesis.  H/O prior ablation treatment 01/01/1997    Dr. Tim Hendrix H/O rheumatic heart disease     H/O: CVA (cerebrovascular accident)     History of mitral valve prosthesis     Metallic medtronic valve    HTN (hypertension)     Hx of cardiovascular stress test 09/30/2015    lexiscan-mild to moderate ischemia basal inferior and mid inferior,EF56%    Hx of echocardiogram 09/30/2015    EF. 45-50%. Moderate left atrial enlargement. Regional wall motion abnormality w/ probably mild reduction of LVSF. Normal sized abd aorta at 2.2cm. Mild aortic regurgitation.  Hx of echocardiogram 06/22/2021    50-55%. mechanical prosthetic valve is present with a meangradient of 2mmHg    Hyperlipemia     Mitral valve prolapse     S/P MVR (mitral valve repair) 12/23/1991    VHD (valvular heart disease) 01/01/1998     Past Surgical History: Procedure Laterality Date    CARDIAC SURGERY      CARDIAC VALVE SURGERY      MITRAL VALVE REPLACEMENT  2/11/98    [employer] serial # [id number], model #     TUBAL LIGATION       IMedications Prior to Admission:    No current facility-administered medications on file prior to encounter. Current Outpatient Medications on File Prior to Encounter   Medication Sig Dispense Refill    HYDROcodone-acetaminophen (NORCO) 5-325 MG per tablet TAKE 1 TABLET BY MOUTH EVERY SIX HOURS AS NEEDED      enoxaparin (LOVENOX) 100 MG/ML injection Inject 1 mL into the skin 2 times daily 20 Syringe 0    Cetirizine HCl 10 MG CAPS Take 1 tablet by mouth daily      Cyanocobalamin (VITAMIN B-12) 2000 MCG TBCR Take 1 tablet by mouth daily      Vitamin D (CHOLECALCIFEROL) 1000 UNITS CAPS capsule Take 1,000 Units by mouth daily      aspirin 81 MG tablet Take 81 mg by mouth daily      docusate sodium (COLACE) 100 MG capsule Take 100 mg by mouth nightly      metoprolol (LOPRESSOR) 50 MG tablet Take 50 mg by mouth 3 times daily      warfarin (COUMADIN) 2 MG tablet Take 2 mg by mouth daily Indications: **PCP followings pt's PT/INRs, along w/prescribing her Coumadin. **       metoprolol (LOPRESSOR) 25 MG tablet   Take 25 mg by mouth 3 times daily       citalopram (CELEXA) 40 MG tablet Take 20 mg by mouth daily            Allergies:  Adenosine, Pravachol [pravastatin sodium], and Statins    Social History:   Social History     Socioeconomic History    Marital status:      Spouse name: Not on file    Number of children: Not on file    Years of education: Not on file    Highest education level: Not on file   Occupational History    Not on file   Tobacco Use    Smoking status: Former Smoker    Smokeless tobacco: Never Used   Substance and Sexual Activity    Alcohol use: No     Alcohol/week: 0.0 standard drinks    Drug use: No    Sexual activity: Yes     Partners: Male     Comment:    Other Topics Concern    Not on file   Social History Narrative    Not on file     Social Determinants of Health     Financial Resource Strain:     Difficulty of Paying Living Expenses:    Food Insecurity:     Worried About Running Out of Food in the Last Year:     [address] in the Last Year:    Transportation Needs:     Lack of Transportation (Medical):  Lack of Transportation (Non-Medical):    Physical Activity:     Days of Exercise per Week:     Minutes of Exercise per Session:    Stress:     Feeling of Stress :    Social Connections:     Frequency of Communication with Friends and Family:     Frequency of Social Gatherings with Friends and Family:     Attends Christian Services:     Active Member of Clubs or Organizations:     Attends Club or Organization Meetings:     Marital Status:    Intimate Partner Violence:     Fear of Current or Ex-Partner:     Emotionally Abused:     Physically Abused:     Sexually Abused:          Family History:   . History reviewed. No pertinent family history. Father had CVA  REVIEW OF SYSTEMS:  CONSTITUTIONAL:  negative  EYES:  negative  HEENT:  negative  RESPIRATORY:  negative  CARDIOVASCULAR:  negative  GASTROINTESTINAL:  negative  ENDOCRINE:  negative  MUSCULOSKELETAL:  positive for  decreased range of motion left arm(bandaged)  NEUROLOGICAL:  Negative(not aware she had facial drooping)  BEHAVIOR/PSYCH:  negative  PHYSICAL EXAM:    Vitals:  BP (!) 172/58   Pulse 65   Temp 98.9 °F (37.2 °C) (Oral)   Resp 15   Ht 5' 7\" (1.702 m)   Wt 212 lb (96.2 kg)   SpO2 100%   BMI 33.20 kg/m²     CONSTITUTIONAL:  awake  EYES:  lids and lashes normal  ENT:  Left facial droop noted upon smiling  LUNGS:  No increased work of breathing, good air exchange, clear to auscultation bilaterally, no crackles or wheezing  CARDIOVASCULAR:  Irregular and normal rate  MUSCULOSKELETAL:  Good hand  and good motor strength BLE.  Has BLE edema  NEUROLOGIC:  Awake, alert, oriented to name, place and time.  Cranial nerves with left facial droop  Motor is 5 out of 5 bilaterally except left arm as in sling. Sensory is intact.   SKIN:  no bruising or bleeding    DATA:  CTA head  No large vessel occlusion in the head or neck.       Mild internal carotid artery fibromuscular dysplasia.       Bilateral posterior cerebral artery stenosis     CT head  NAD  CXR pending  EKG AFib  ASSESSMENT AND PLAN:    Acute CVA with left facial droop  -ASA, continue coumadin with bridge  -cva most likely due to missing lovenox bridge as subtherapeutic  -MRI brain ordered but may not be candidate with mechanical valve  -family does not want   -unable to give statin as has adverse reaction  -not a candidate for TPA  HTN  -hold BB today and place on protocol as per CVA to allow permissive HTN  S/p mechanical MVR   -subtherapeutic and continue bridge lvoenox wit coumadin  Afib  -on BB and hold today and placed for tomorrow   -will consult cardio  Mod PCM

## 2021-07-04 NOTE — ED NOTES
Bed: ED-17  Expected date:   Expected time:   Means of arrival:   Comments:  Abdi Berman, RN  07/04/21 8019

## 2021-07-04 NOTE — ED TRIAGE NOTES
Pt from home. Slurred speech sudden onset approx. [address] Hx of stroke. Left sided facial droop. HTN per EMS.  ER.

## 2021-07-05 NOTE — PROGRESS NOTES
Occupational [address] ACUTE CARE OCCUPATIONAL THERAPY EVALUATION  Homa Hinds, 1950, 3007/3007-A, 7/5/2021    History  [place]:  The encounter diagnosis was Slurred speech. Patient  has a past medical history of Afib ([address] Anticoagulated on Coumadin, Atrial fibrillation and flutter ([address] Atrioventricular godwin re-entry tachycardia ([address] H/O echocardiogram, H/O prior ablation treatment, H/O rheumatic heart disease, H/O: CVA (cerebrovascular accident), History of mitral valve prosthesis, HTN (hypertension), Hx of cardiovascular stress test, Hx of echocardiogram, Hx of echocardiogram, Hyperlipemia, Mitral valve prolapse, S/P MVR (mitral valve repair), and VHD (valvular heart disease). Patient  has a past surgical history that includes Cardiac surgery; Cardiac valuve replacement; Tubal ligation; and Mitral valve replacement (2/11/98).     Subjective:  Patient states:  \"I'm not unsteady on my feet it's just my arm\"   Pain:  2/10 pain in LUE    Pain Intervention: Increased movement, repositioned, RN notified  Communication with other providers:  Handoff to RN  Restrictions: General Precautions, Fall Risk    Home Setup/Prior level of function  Social/Functional History  Lives With: Spouse  Type of Home: House  Home Layout: Two level  Home Access: Stairs to enter without rails  Entrance Stairs - Number of Steps: 4  Bathroom Shower/Tub: Walk-in shower  Bathroom Toilet: Standard  Bathroom Equipment: Tub transfer bench  ADL Assistance: Independent  Homemaking Assistance: Independent  Homemaking Responsibilities: Yes  Ambulation Assistance: Independent  Transfer Assistance: Independent  Active : Yes  Mode of Transportation: Car  Occupation: Retired  Additional Comments: Pt reports about 2 falls in last 6 months    Examination of body systems (includes body structures/functions, activity/participation limitations):  · Observation:  Supine in bed upon arrival, family in room  · Vision:  [employer]  · Hearing:  [facility]  · Cardiopulmonary:  No 02 needs      Body Systems and functions:  · ROM R/L:  LUE: shoulder flexion WFL distal limited due to sling/injury, RUE: WNL    · Strength R/L:  RUE: 5/5, LUE: DNT due to injury   · Sensation: WFL  · Tone: Normal  · Coordination: WFL  · Perception: WNL    Activities of Daily Living (ADLs):  · Feeding: Yuni  · Grooming: Setup (washing face w/ warm washcloth)  · UB bathing: Calvin  · LB bathing: SBA (washing tricia area sitting upright on standard commode)  · UB dressing: Calvin  · LB dressing: SBA (pt threading underwear/pants in stand to prepare for toileting)  · Toileting: SBA (pt toileted on standard commode this session, see LB bathing/dressing for details)    Cognitive and Psychosocial Functioning:  · Overall cognitive status: WNL  · Affect: Normal        Mobility:  · Supine to sit:  Yuni w/ use of bed rails and increased time  · Transfers: SBA from EOB up to stand w/o AD  · Sitting balance:  Independent. · Standing balance:  SBA  · Functional Mobility: SBA to/from bathroom. · Toilet Transfers: SBA to/from standard commode             AM-PAC Daily Activity Inpatient   How much help for putting on and taking off regular lower body clothing?: A Little  How much help for Bathing?: A Little  How much help for Toileting?: A Little  How much help for putting on and taking off regular upper body clothing?: A Little  How much help for taking care of personal grooming?: A Little  How much help for eating meals?: None  AM-[facility] Inpatient Daily Activity Raw Score: 19  AM-PAC Inpatient ADL T-Scale Score : 40.22  ADL Inpatient CMS 0-100% Score: 42.8  ADL Inpatient CMS G-Code Modifier : CK    Treatment:  Self Care Training:   Cues were given for safety, sequence, UE/LE placement, visual cues, and balance.     Activities performed today included LB bathing/dressing, toileting, toilet transfer, grooming      Safety: patient left in chair with chair alarm, call light within reach, RN notified, gait belt used. Assessment:  Pt is a 64 yo female admitted from home for Acute CVA. Pt at baseline is Independent for ADLs Independent for high level IADLs and Independent for functional transfers/mobility. Pt currently presents w/ deficits in ADL and high level IADL independence, functional activity tolerance, dynamic sitting and standing balance and tolerance and functional transfers and is limited in ROM/strength in LUE due to injury. Pt would benefit from continued acute care OT services w/ discharge to home w/ home health OT S1 w/ assist PRN  Complexity: Low  Prognosis: Good, no significant barriers to participation at this time.    Plan  Times per week: 1x+  Times per day: Daily  Current Treatment Recommendations: Strengthening, ROM, Endurance Training, Pain Management, Equipment Evaluation, Education, & procurement, Patient/Caregiver Education & Training, Functional Mobility Training, Home Management Training, Balance Training, Positioning, Safety Education & Training, Self-Care / ADL     Equipment: defer    Goals:  Pt goal: go home  Time Frame for STGs: discharge  Goal 1: Pt will perform UE ADLs Yuni  Goal 2: Pt will perform LE ADLs Yuni  Goal 3: Pt will perform toileting Yuni  Goal 4: Pt will perform functional transfer w/ AD Yuni  Goal 5: Pt will perform functional mobility w/ AD Yuni  Goal 6: Pt will perform therex/theract in order to increase functional activity tolerance and dynamic standing balance    Treatment plan:  Pt will perform UE ADL tasks w/ modifications to increase UE ADL completion    Recommendations for NURSING activity: Up to chair for all 3 meals and up to standard commode for all toileting needs    Time:   Time in: 0659  Time out: 0724  Timed treatment minutes: 10 minutes  Total time: 25 minutes    Electronically signed by:    Lea VENEGAS/NGOC [id number]  7:31 AM,7/5/2021

## 2021-07-05 NOTE — PROGRESS NOTES
PHARMACY ANTICOAGULATION MONITORING [address] is a 65 y.o. female on warfarin therapy for atrial fibrillation, mitral valve prosthesis (Metallic medtronic valve). Pharmacy consulted by Dr. Sebastian Bertrand for monitoring and adjustment of treatment. Indication for anticoagulation: Atrial fibrillation, Mitral valve prosthesis (Metallic medtronic valve)  INR goal: 2.5 - 3.5  Warfarin dose prior to admission: 2 mg po daily    Pertinent Laboratory Values   Recent Labs     07/04/21  1610 07/04/21  1610 07/05/21  0736   INR 1.08   < > 1.15   HGB 10.8*   < > 11.2*   HCT 33.8*   < > 34.8*     --  205    < > = values in this interval not displayed. Assessment/Plan:   Enoxaparin bridge while INR subtherapeutic   Continue warfarin 2mg daily    Pharmacy will continue to monitor and adjust warfarin therapy as indicated    Thank you for the consult.   Irena Berman, [facility]  7/5/2021 12:47 PM

## 2021-07-05 NOTE — PROGRESS NOTES
Hospitalist Progress Note      PCP: Lawanda Dent MD    Date of Admission: 7/4/2021    Hospital Course: \"69 y.o. female Hx past medical history of Afib, MVR on coumadin and hx of CVA 1997 with no residual deficits. Pt had left facial droop with no slurred speech. She was on lovenox bridge with coumadin and yesterday she did not take her lovenox and just took her coumadin.          Subjective:   No new complaints. She has no new weakness. Medications:  Reviewed    Infusion Medications    sodium chloride      sodium chloride       Scheduled Medications    sodium chloride flush  5-40 mL Intravenous 2 times per day    aspirin  81 mg Oral Daily    vitamin B-12  2,000 mcg Oral Daily    citalopram  20 mg Oral Daily    docusate sodium  100 mg Oral Nightly    enoxaparin  1 mg/kg Subcutaneous BID    warfarin  2 mg Oral Daily    sodium chloride flush  5-40 mL Intravenous 2 times per day    metoprolol tartrate  75 mg Oral TID     PRN Meds: sodium chloride flush, sodium chloride, sodium chloride flush, sodium chloride, ondansetron **OR** ondansetron, polyethylene glycol, labetalol, oxyCODONE-acetaminophen    No intake or output data in the 24 hours ending 07/05/21 1108    Physical Exam Performed:    BP (!) 151/57   Pulse 56   Temp 98.5 °F (36.9 °C) (Oral)   Resp 14   Ht 5' 7\" (1.702 m)   Wt 212 lb (96.2 kg)   SpO2 96%   BMI 33.20 kg/m²     General appearance: No apparent distress, appears stated age and cooperative. HEENT: Pupils equal, round, and reactive to light. Conjunctivae/corneas clear. Neck: Supple, with full range of motion. No jugular venous distention. Trachea midline. Respiratory:  Normal respiratory effort. Clear to auscultation, bilaterally without Rales/Wheezes/Rhonchi. Cardiovascular: Regular rate and rhythm with normal S1/S2 without murmurs, rubs or gallops. Abdomen: Soft, non-tender, non-distended with normal bowel sounds.   Musculoskeletal: No clubbing, cyanosis or edema bilaterally. Left arm in cast.  Skin: Skin color, texture, turgor normal.  No rashes or lesions. Neurologic:  Neurovascularly intact without any focal sensory/motor deficits. Cranial nerves: II-XII intact, grossly non-focal.  Psychiatric: Alert and oriented, thought content appropriate, normal insight      Labs:   Recent Labs     07/04/21  1610 07/05/21  0736   WBC 7.2 7.2   HGB 10.8* 11.2*   HCT 33.8* 34.8*    205     Recent Labs     07/04/21  1610      K 3.7      CO2 26   BUN 11   CREATININE 0.7   CALCIUM 8.6     Recent Labs     07/04/21  1610   AST 17   ALT 8*   BILITOT 0.3   ALKPHOS 63     Recent Labs     07/02/21  1424 07/04/21  1610 07/05/21  0736   INR 0.95 1.08 1.15     No results for input(s): Kaya Gael in the last 72 hours. Urinalysis:    No results found for: Annemarie Dose, BACTERIA, RBCUA, BLOODU, SPECGRAV, [address]    Radiology:  XR CHEST PORTABLE   Final Result   1. Central pulmonary vascular congestion without overt pulmonary edema. CTA NECK W CONTRAST   Final Result   No large vessel occlusion in the head or neck. Mild internal carotid artery fibromuscular dysplasia. Bilateral posterior cerebral artery stenosis. CTA HEAD W CONTRAST   Final Result   No large vessel occlusion in the head or neck. Mild internal carotid artery fibromuscular dysplasia. Bilateral posterior cerebral artery stenosis. CT HEAD WO CONTRAST   Final Result   No acute intracranial abnormality. Critical results were called by Dr. Isauro Godoy MD to [facility] on   7/4/2021 at 16:02.          MRI brain without contrast    (Results Pending)           Assessment/Plan:    Active Hospital Problems    Diagnosis     Acute CVA (cerebrovascular accident) ([facility] [address] [I63.9]      Acute CVA with left facial droop-cva most likely due to missing lovenox bridge as subtherapeutic  CTA head and neck didn't show any large vessel occlusion but bilateral posterior cerebral artery stenosis  -Not a candidate for TPA. Continue ASA, continue coumadin with bridge  -MRI brain showed CVA  -family declined Neurology consultation with Dr Jose Luis Winston. Alternate consultation was made.  -unable to give statin as has adverse reaction    HTN  -BP controlled. S/p mechanical MVR   -Subtherapeutic and continue bridge lovenox with coumadin. Afib  -Bradycardia,holding parameters on metoprolol  -Consulted Cardiology. Left wrist fracture s/p repair  -Left arm in cast.    DVT Prophylaxis:lovenox/warfarin  Diet: ADULT DIET;  Regular  Code Status: Full Code    PT/OT Eval Status: as needed    Dispo - Awaiting INR to be therapeutic or D/C on lovenox bridge    Ty Angulo MD

## 2021-07-05 NOTE — PROGRESS NOTES
Pt stated that she was taking Percocet at home for here surgical pain. This RN sent a message to Hospitalist requesting Percocet. See new orders.

## 2021-07-05 NOTE — PROGRESS NOTES
PHARMACY ANTICOAGULATION MONITORING [address] is a 65 y.o. female on warfarin therapy for atrial fibrillation, mitral valve prosthesis (Metallic medtronic valve). Pharmacy consulted by Dr. Zachary Randall for monitoring and adjustment of treatment. Indication for anticoagulation: Atrial fibrillation, Mitral valve prosthesis (Metallic medtronic valve)  INR goal: 2.5 - 3.5  Warfarin dose prior to admission: 2 mg po daily    Pertinent Laboratory Values   Recent Labs     07/04/21  1610   INR 1.08   HGB 10.8*   HCT 33.8*          Assessment/Plan:  Drug Interactions:   Citalopram (Home med)  Aspirin (Home med)  Norco (PRN, Home med)  INR subtherapeutic on admission at 1.08 (Lovenox bridge therapy in place)  Patient's warfarin therapy was on hold starting 06/24 (with Lovenox 1 mg/kg BID starting 6/25) in anticipation of procedure. Warfarin restarted after procedure (with Lovenox bridge). Patient only took Warfarin yesterday (07/03). Give Warfarin 3 mg today; Resume Warfarin 2 mg po daily on 07/05. Will trend INR daily. Pharmacy will continue to monitor and adjust warfarin therapy as indicated    Thank you for the consult.   Austin Garay, [facility], [address]  7/4/2021 7:52 PM

## 2021-07-05 NOTE — PROGRESS NOTES
Speech Language Pathology  Facility/Department: [address] [place] [address]   CLINICAL BEDSIDE SWALLOW EVALUATION    NAME: Homa Hinds  : 1950  MRN: [id number]    Impression  Dysphagia Diagnosis: Swallow function appears grossly intact  Dysphagia Outcome Severity Scale: Level 6: Within functional limits/Modified independence     Homa Hinds was seen for a clinical bedside swallow evaluation following admission for stroke-like symptoms including left sided facial droop. Pt was alert, pleasant, and cooperative and denies any complaints at this time. She reports family was concerned re: facial droop and came to the hospital.  She followed complex commands for oral motor exam which demonstrated trace flattening of the left nasolabial fold and delayed left labial retraction as compared to the right side. Vocal quality and cough strength were WNL. PO trials of regular solids and thins by straw completed with prolonged mastication of regular solid with no dentition (dentures at home) with eventual expectoration of bolus.  0 s/s aspiration for solids and thin liquid trials. Pt declined recommendation for Easy to [employer] level. Recommend: Regular/Thins. General aspiration precautions. No needs identified. ADMISSION DATE: 2021  ADMITTING DIAGNOSIS: has Mitral valve prolapse; Afib ([address] H/O prior ablation treatment; VHD (valvular heart disease); History of mitral valve prosthesis; Abnormal nuclear stress test; Atrioventricular godwin re-entry tachycardia ([address] H/O: CVA (cerebrovascular accident);  Anticoagulated on Coumadin; Atrial fibrillation and flutter ([address] and Acute CVA (cerebrovascular accident) ([address] on their problem list.  ONSET DATE: 2021     Recent Chest Xray/CT of Chest:  Negative for pneumonia/infiltrates    Date of Eval: 2021  Evaluating Therapist: TAMARA Hill    Current Diet level:  Current Diet : Regular  Current Liquid Diet : Thin    Primary Complaint  Patient Complaint: denies    Pain:  Pain Assessment  Pain Assessment: 0-10  Pain Level: 3  Patient's Stated Pain Goal: No pain    Reason for Referral  Shweta Duenas was referred for a bedside swallow evaluation to assess the efficiency of her swallow function, identify signs and symptoms of aspiration and make recommendations regarding safe dietary consistencies, effective compensatory strategies, and safe eating environment. Treatment Plan  Requires SLP Intervention: No  Duration/Frequency of Treatment: n/a  D/C Recommendations: To be determined     Recommended Diet and Intervention  Diet Solids Recommendation: Regular  Liquid Consistency Recommendation: Thin  Recommended Form of Meds: PO  Recommendations: Self feed       Compensatory Swallowing Strategies  Compensatory Swallowing Strategies: Upright as possible for all oral intake    Treatment/Goals  Short-term Goals  Timeframe for Short-term Goals: n/a  Long-term Goals  Timeframe for Long-term Goals: n/a    General  Chart Reviewed: Yes  Behavior/Cognition: Alert; Cooperative;Pleasant mood  Respiratory Status: Room air  O2 Device: None (Room air)  Communication Observation: Functional  Follows Directions: Complex  Dentition: Edentulous  Patient Positioning: Upright in bed  Baseline Vocal Quality: Normal  Volitional Cough: Strong  Prior Dysphagia History: denies  Consistencies Administered: Reg solid; Thin - straw           Vision/Hearing  Hearing  Hearing: Within functional limits    Oral Motor Deficits  Oral/Motor  Oral Motor: Exceptions to [facility]  Labial ROM: Reduced left    Oral Phase Dysfunction  Oral Phase  Oral Phase: WFL     Indicators of Pharyngeal Phase Dysfunction   Pharyngeal Phase  Pharyngeal Phase: WNL    Prognosis  Prognosis  Prognosis for safe diet advancement: excellent  Individuals consulted  Consulted and agree with results and recommendations: Patient    Education  Patient Education: results [facility]  Patient Education Response: [place] understanding  Safety Devices in place: Yes  Type of devices: Left in bed; All fall risk precautions in place       Therapy Time  SLP Individual Minutes  Time In: 1320  Time Out: [address] [facility]  Minutes: [address], [place]  7/5/2021 1:42 PM

## 2021-07-05 NOTE — CONSULTS
CARDIOLOGY CONSULT NOTE   Reason for consultation:  CVA    Referring physician:  Rafael Lawrence MD     Primary care physician: Pj Dick MD      Dear  Dr. Rafael Lawrence MD   Thanks for the consult. Chief Complaints :  Chief Complaint   Patient presents with    Aphasia     Stroke alert        History of present illness:Celina is a 65 y. o.year old who presents with concerns for facial numbness drooping of face with slurred speech lasting about 30 minutes but symptoms resolved and improved. She is supposed to be on Coumadin and Lovenox bridge her INR is not therapeutic she recently underwent surgery after a fall for her left wrist.   She did not restart lovenox after her surgery and her INR is still not therapeutic, She does have history of mechanical mitral valve prosthesis which was a redo valve in 1991. Unfortunately she also has history of atrial fibrillation and multiple CVAs in the past  MRI today confirms right frontal Infarct    Past medical history:    has a past medical history of Afib ([address] Anticoagulated on Coumadin, Atrial fibrillation and flutter ([address] Atrioventricular godwin re-entry tachycardia ([address] H/O echocardiogram, H/O prior ablation treatment, H/O rheumatic heart disease, H/O: CVA (cerebrovascular accident), History of mitral valve prosthesis, HTN (hypertension), Hx of cardiovascular stress test, Hx of echocardiogram, Hx of echocardiogram, Hyperlipemia, Mitral valve prolapse, S/P MVR (mitral valve repair), and VHD (valvular heart disease). Past surgical history:   has a past surgical history that includes Cardiac surgery; Cardiac valuve replacement; Tubal ligation; and Mitral valve replacement (2/11/98). Social History:   reports that she has quit smoking. She has never used smokeless tobacco. She reports that she does not drink alcohol and does not use drugs.   Family history:   no family history of CAD, STROKE of DM at early age    Allergies   Allergen Reactions    Adenosine  Pravachol [Pravastatin Sodium] Other (See Comments)     Muscle aches, stomach ache    Statins Other (See Comments)     Causes muscle aches.       sodium chloride flush 0.9 % injection 5-40 mL, 2 times per day  sodium chloride flush 0.9 % injection 5-40 mL, PRN  0.9 % sodium chloride infusion, PRN  aspirin chewable tablet 81 mg, Daily  vitamin B-12 (CYANOCOBALAMIN) tablet 2,000 mcg, Daily  citalopram (CELEXA) tablet 20 mg, Daily  docusate sodium (COLACE) capsule 100 mg, Nightly  enoxaparin (LOVENOX) injection 100 mg, BID  warfarin (COUMADIN) tablet 2 mg, Daily  sodium chloride flush 0.9 % injection 5-40 mL, 2 times per day  sodium chloride flush 0.9 % injection 5-40 mL, PRN  0.9 % sodium chloride infusion, PRN  ondansetron (ZOFRAN-ODT) disintegrating tablet 4 mg, Q8H PRN   Or  ondansetron (ZOFRAN) injection 4 mg, Q6H PRN  polyethylene glycol (GLYCOLAX) packet 17 g, Daily PRN  metoprolol tartrate (LOPRESSOR) tablet 75 mg, TID  labetalol (NORMODYNE;TRANDATE) injection 10 mg, Q10 Min PRN  oxyCODONE-acetaminophen (PERCOCET) 5-325 MG per tablet 1 tablet, Q6H PRN      Current Facility-Administered Medications   Medication Dose Route Frequency Provider Last Rate Last Admin    sodium chloride flush 0.9 % injection 5-40 mL  5-40 mL Intravenous 2 times per day Pilar Ness MD        sodium chloride flush 0.9 % injection 5-40 mL  5-40 mL Intravenous PRN Pilar Ness MD        0.9 % sodium chloride infusion  25 mL Intravenous PRN Pilar Ness MD        aspirin chewable tablet 81 mg  81 mg Oral Daily Marycruz Guerrero MD   81 mg at 07/05/21 0834    vitamin B-12 (CYANOCOBALAMIN) tablet 2,000 mcg  2,000 mcg Oral Daily Marycruz Guerrero MD   2,000 mcg at 07/05/21 0842    citalopram (CELEXA) tablet 20 mg  20 mg Oral Daily Marycruz Guerrero MD   20 mg at 07/05/21 0835    docusate sodium (COLACE) capsule 100 mg  100 mg Oral Nightly Marycruz Guerrero MD   100 mg at 07/04/21 2051    enoxaparin (LOVENOX) injection 100 mg  1 mg/kg Subcutaneous BID Rodney Cox MD   100 mg at 07/05/21 4660    warfarin (COUMADIN) tablet 2 mg  2 mg Oral Daily Rodney Cox MD        sodium chloride flush 0.9 % injection 5-40 mL  5-40 mL Intravenous 2 times per day Rodney Cox MD   10 mL at 07/05/21 0845    sodium chloride flush 0.9 % injection 5-40 mL  5-40 mL Intravenous PRN Rodney Cox MD        0.9 % sodium chloride infusion  25 mL Intravenous PRN Rodney Cox MD        ondansetron (ZOFRAN-ODT) disintegrating tablet 4 mg  4 mg Oral Q8H PRN Rodney Cox MD        Or    ondansetron (ZOFRAN) injection 4 mg  4 mg Intravenous Q6H PRN Rodney Cox MD        polyethylene glycol (GLYCOLAX) packet 17 g  17 g Oral Daily PRN Rodney Cox MD        metoprolol tartrate (LOPRESSOR) tablet 75 mg  75 mg Oral TID Wilmer Russell MD        labetalol (NORMODYNE;TRANDATE) injection 10 mg  10 mg Intravenous Q10 Min PRN Rodney Cox MD        oxyCODONE-acetaminophen (PERCOCET) 5-325 MG per tablet 1 tablet  1 tablet Oral Q6H PRN Kristi Del Rio APRN - CNP   1 tablet at 07/05/21 3920     Review of Systems:   · Constitutional: No Fever or Weight Loss   · Eyes: No Decreased Vision  · ENT: No Headaches, Hearing Loss or Vertigo  · Cardiovascular: As per HPI  · Respiratory: As per HPI  · Gastrointestinal: No abdominal pain, appetite loss, blood in stools, constipation, diarrhea or heartburn  · Genitourinary: No dysuria, trouble voiding, or hematuria  · Musculoskeletal:  No gait disturbance, weakness or joint complaints  · Integumentary: No rash or pruritis  · Neurological: No TIA or stroke symptoms  · Psychiatric: No anxiety or depression  · Endocrine: No malaise, fatigue or temperature intolerance  · Hematologic/Lymphatic: No bleeding problems, blood clots or swollen lymph nodes  · Allergic/Immunologic: No nasal congestion or hives  All systems negative except as marked.         Physical Examination:    Vitals:    07/04/21 1952 07/05/21 0221 07/05/21 0224 07/05/21 0815   BP: (!) 195/67 (!) 154/66 (!) 151/57   Pulse: 58   56   Resp: 13   14   Temp: 98 °F (36.7 °C) 98.1 °F (36.7 °C)  98.5 °F (36.9 °C)   TempSrc: Oral Oral  Oral   SpO2: 97% 97%  96%   Weight:       Height:           General Appearance:  No distress, conversant    Constitutional:  Well developed, Well nourished, No acute distress, Non-toxic appearance. HENT:  Normocephalic, Atraumatic, Bilateral external ears normal, Oropharynx moist, No oral exudates, Nose normal. Neck- Normal range of motion, No tenderness, Supple, No stridor,no apical-carotid delay  Lymphatics : no palpable lymph nodes  Eyes:  PERRL, EOMI, Conjunctiva normal, No discharge. Respiratory:  Normal breath sounds, No respiratory distress, No wheezing, No chest tenderness. ,no use of accessory muscles, crackles Present  Cardiovascular: (PMI) apex non displaced,no lifts no thrills, ankle swelling Absent  , 1+, s1 and s2 audible,Murmur. Present, JVD not noted , mechanical clicks   Abdomen /GI:  Bowel sounds normal, Soft, No tenderness, No masses, No gross visceromegaly   :  No costovertebral angle tenderness   Musculoskeletal:  No edema, no tenderness, no deformities. Back- no tenderness  Integument:  Well hydrated, no rash   Lymphatic:  No lymphadenopathy noted   Neurologic:  Alert & oriented x 3, CN 2-12 normal, normal motor function, normal sensory function, no focal deficits noted           Medical decision making and Data review:    Lab Review   Recent Labs     07/05/21  0736   WBC 7.2   HGB 11.2*   HCT 34.8*         Recent Labs     07/04/21  1610      K 3.7      CO2 26   BUN 11   CREATININE 0.7     Recent Labs     07/04/21  1610   AST 17   ALT 8*   BILITOT 0.3   ALKPHOS 63     Recent Labs     07/04/21  1610 07/04/21  1929 07/04/21  2301   TROPONINT <0.010 <0.010 <0.010       No results for input(s): PROBNP in the last 72 hours.   Lab Results   Component Value Date    INR 1.15 07/05/2021    PROTIME 14.9 (H) 07/05/2021       EKG: (reviewed by myself)    ECHO:(reviewed by myself)    Chest Xray:(reviewed by myself)  ECHO Complete 2D W Doppler W Color    Result Date: 6/22/2021  Transthoracic Echocardiography Report (TTE)  Demographics   Patient Name      Thor Singer    Date of Study       06/22/2021   Date of Birth     1950          Gender              Female   Age               65 year(s)          Race                   Patient Number    [id number]            Room Number   Visit Number      [id number]   Corporate ID      [id number]   Accession Number  [id number]           Scarlet Deepti,                                                            CRT   Ordering          Benito Lopez MD  Interpreting        Benito Lopez MD  Physician                             Physician  Procedure Type of Study   TTE procedure:ECHOCARDIOGRAM COMPLETE 2D W DOPPLER W COLOR. Procedure Date Date: 06/22/2021 Start: 03:10 PM Study Location: [address] Technical Quality: Fair visualization Indications:Preop cardiac evaluation. Patient Status: Routine Height: 67 inches Weight: 212 pounds BSA: 2.07 m2 BMI: 33.2 kg/m2 Rhythm: Atrial fibrillation HR: 98 bpm BP: 120/68 mmHg  Conclusions   Summary  Left ventricular function is low normal, EF is estimated at 50-55%. Left ventricular size is mildly increased . Mild left ventricular hypertrophy. Diastolic dysfunction could not be evaluated due to MVR. Bi atrial enlargement noted. Aortic valve leaflets are somewhat thickened. Normally functioning mechanical prosthetic valve is present with a mean  gradient of 2mmHg. Mild tricuspid and aortic regurgitation. RVSP is 38 mmHg. No evidence of pericardial effusion.    Signature   ------------------------------------------------------------------  Electronically signed by Benito Lopez MD (Interpreting  physician) on 06/22/2021 at 05:45 PM  ------------------------------------------------------------------   Findings   Left Ventricle  Left ventricular function is low normal, EF is estimated at 50-55%. Left ventricular size is mildly increased . Mild left ventricular hypertrophy. Diastolic dysfunction could not be evaluated due to MVR. Left Atrium  Moderately dilated left atrium. Right Atrium  Moderately dilated right atrium. Right Ventricle  Essentially normal right ventricle. Aortic Valve  Aortic valve leaflets are somewhat thickened. Mild aortic regurgitation; PHT: 605msec. Mitral Valve  Normally functioning mechanical prosthetic valve is present with a mean  gradient of 2mmHg. Tricuspid Valve  Normal tricuspid valve structure and function. Mild tricuspid regurgitation; RVSP is 38 mmHg. Pulmonic Valve  The pulmonic valve was not well visualized. Pericardial Effusion  No evidence of pericardial effusion. Pleural Effusion  No evidence of pleural effusion. Miscellaneous  No abnormalities were noted in the IVC, abdominal aorta, or aortic root.   M-Mode/2D Measurements & Calculations   LV Diastolic Dimension:  LV Systolic Dimension:  LA Dimension: 4.7 cmAO Root  5.56 cm                  3.85 cm                 Dimension: 3.8 cmLA Area:  LV FS:30.8 %             LV Volume Diastolic: 71 25.4 cm2  LV PW Diastolic: 0.17 cm ml  LV PW Systolic: 0.14 cm  LV Volume Systolic: 35  Septum Diastolic: 0.21   ml  cm                       LV EDV/LV EDV Index: 71 RV Diastolic Dimension:  Septum Systolic: 4.29 cm YY/26 D4KU ESV/LV ESV   3.08 cm  CO: 7.82 l/min           Index: 35 ml/17 m2  CI: 3.78 l/m*m2          EF Calculated (A4C):    LA/Aorta: 1.24                           50.7 %  LV Area Diastolic: 92.8  EF Calculated (2D):     LA volume/Index: 65 ml  cm2                      57.7 %                  /26J2  LV Area Systolic: 60.5                           RA Dimension: 5.1 cm  cm2                      LV Length: 8.23 cm                            LVOT: 2 cm  Doppler Measurements & Calculations   MV Peak E-Wave: 125     AV Peak Velocity: 174 cm/s  LVOT Mean Velocity: 82.6  cm/s                    AV Peak Gradient: 12.11     cm/s                          mmHg                        LVOT Mean Gradient: 3  MV Peak Gradient: 6.25  AV Mean Velocity: 129 cm/s  mmHg  mmHg                    AV Mean Gradient: 7 mmHg    Estimated RVSP: 38 mmHg  MV Mean Gradient: 2     AV VTI: 38.9 cm             Estimated RAP:3 mmHg  mmHg                    AV Area (Continuity):2.05  MV Mean Velocity: 50.2  cm2  cm/s                                                TR Velocity:211 cm/s  MV P1/2t: 72 msec       LVOT VTI: 25.4 cm           TR Gradient:17.81 mmHg  MVA by PHT:3.06 cm2     AV P1/2t: 605 msec  MV Area (continuity):   Estimated PASP: 20.81 mmHg  2.52 cm2      CT HEAD WO CONTRAST    Result Date: 7/4/2021  EXAMINATION: CT OF THE HEAD WITHOUT CONTRAST  7/4/2021 3:52 pm TECHNIQUE: CT of the head was performed without the administration of intravenous contrast. Dose modulation, iterative reconstruction, and/or weight based adjustment of the mA/kV was utilized to reduce the radiation dose to as low as reasonably achievable. COMPARISON: None. HISTORY: ORDERING SYSTEM PROVIDED HISTORY: facial droop, h/o stroke TECHNOLOGIST PROVIDED HISTORY: Has a \"code stroke\" or \"stroke alert\" been called? ->Yes Reason for exam:->facial droop, h/o stroke Decision Support Exception - unselect if not a suspected or confirmed emergency medical condition->Emergency Medical Condition (MA) Reason for Exam: facial droop, h/o stroke Acuity: Acute Type of Exam: Initial Relevant Medical/Surgical History: hx CVA FINDINGS: The examination is motion degraded. BRAIN/VENTRICLES: There is no acute intracranial hemorrhage, mass effect or midline shift. No abnormal extra-axial fluid collection. The gray-white differentiation is maintained without evidence of an acute infarct. There is no evidence of hydrocephalus. Chronic encephalomalacia in the right frontal lobe.  ORBITS: The visualized portion of the orbits demonstrate no acute abnormality. SINUSES: The visualized paranasal sinuses and mastoid air cells demonstrate no acute abnormality. SOFT TISSUES/SKULL:  No acute abnormality of the visualized skull or soft tissues. No acute intracranial abnormality. Critical results were called by Dr. Colton Moulton MD to [facility] on 7/4/2021 at 16:02. XR CHEST PORTABLE    Result Date: 7/4/2021  EXAMINATION: ONE XRAY VIEW OF THE CHEST 7/4/2021 5:13 pm COMPARISON: Chest x-ray October 12, 2015 HISTORY: ORDERING SYSTEM PROVIDED HISTORY: stroke alert TECHNOLOGIST PROVIDED HISTORY: Reason for exam:->stroke alert Acuity: Acute Type of Exam: Initial FINDINGS: Cardiac silhouette is enlarged but grossly stable in appearance. Central pulmonary vascular congestion without overt pulmonary edema. Chronic interstitial changes of the lungs. Suboptimal evaluation of the left lung base due to soft tissue attenuation. No acute osseous abnormality identified. 1. Central pulmonary vascular congestion without overt pulmonary edema. CTA NECK W CONTRAST    Result Date: 7/4/2021  EXAMINATION: CTA OF THE HEAD WITH CONTRAST; CTA OF THE NECK 7/4/2021 4:04 pm: TECHNIQUE: CTA of the head/brain was performed with the administration of intravenous contrast. Multiplanar reformatted images are provided for review. MIP images are provided for review. Dose modulation, iterative reconstruction, and/or weight based adjustment of the mA/kV was utilized to reduce the radiation dose to as low as reasonably achievable.; CTA of the neck was performed with the administration of intravenous contrast. Multiplanar reformatted images are provided for review. MIP images are provided for review. Stenosis of the internal carotid arteries measured using NASCET criteria. Dose modulation, iterative reconstruction, and/or weight based adjustment of the mA/kV was utilized to reduce the radiation dose to as low as reasonably achievable. COMPARISON: None. HISTORY: ORDERING SYSTEM PROVIDED HISTORY: facial droop, h/o stroke TECHNOLOGIST PROVIDED HISTORY: Has a \"code stroke\" or \"stroke alert\" been called? ->Yes Reason for exam:->facial droop, h/o stroke Decision Support Exception - unselect if not a suspected or confirmed emergency medical condition->Emergency Medical Condition (MA) Reason for Exam: facial droop-left side, h/o stroke Acuity: Acute Type of Exam: Initial Additional signs and symptoms: slurred speech Relevant Medical/Surgical History: 75ml isovue 370/ stroke alert FINDINGS: CTA NECK: AORTIC ARCH/ARCH VESSELS: No dissection or arterial injury. No significant stenosis of the brachiocephalic or subclavian arteries. CAROTID ARTERIES: No dissection, arterial injury, or hemodynamically significant stenosis by NASCET criteria. Slight beading of the cervical segments of the internal carotid arteries could be due to fibromuscular dysplasia. VERTEBRAL ARTERIES: No dissection, arterial injury, or significant stenosis. SOFT TISSUES: The lung apices are clear. No cervical or superior mediastinal lymphadenopathy. The larynx and pharynx are unremarkable. No acute abnormality of the salivary and thyroid glands. BONES: No acute osseous abnormality. CTA HEAD: ANTERIOR CIRCULATION: No significant stenosis of the intracranial internal carotid, anterior cerebral, or middle cerebral arteries. No aneurysm. The right MCA branch supplying the region of previous infarct in the frontal lobe is attenuated. POSTERIOR CIRCULATION: Moderate stenosis of the left posterior cerebral artery P1 segment. Severe stenosis of the right posterior cerebral artery P2 segment. Basilar artery is patent. The superior cerebellar and posteroinferior cerebral arteries are patent. OTHER: No dural venous sinus thrombosis on this non-dedicated study. BRAIN: No mass effect or midline shift. No extra-axial fluid collection. The gray-white differentiation is maintained.      No large vessel occlusion in the head or neck. Mild internal carotid artery fibromuscular dysplasia. Bilateral posterior cerebral artery stenosis. CTA HEAD W CONTRAST    Result Date: 7/4/2021  EXAMINATION: CTA OF THE HEAD WITH CONTRAST; CTA OF THE NECK 7/4/2021 4:04 pm: TECHNIQUE: CTA of the head/brain was performed with the administration of intravenous contrast. Multiplanar reformatted images are provided for review. MIP images are provided for review. Dose modulation, iterative reconstruction, and/or weight based adjustment of the mA/kV was utilized to reduce the radiation dose to as low as reasonably achievable.; CTA of the neck was performed with the administration of intravenous contrast. Multiplanar reformatted images are provided for review. MIP images are provided for review. Stenosis of the internal carotid arteries measured using NASCET criteria. Dose modulation, iterative reconstruction, and/or weight based adjustment of the mA/kV was utilized to reduce the radiation dose to as low as reasonably achievable. COMPARISON: None. HISTORY: ORDERING SYSTEM PROVIDED HISTORY: facial droop, h/o stroke TECHNOLOGIST PROVIDED HISTORY: Has a \"code stroke\" or \"stroke alert\" been called? ->Yes Reason for exam:->facial droop, h/o stroke Decision Support Exception - unselect if not a suspected or confirmed emergency medical condition->Emergency Medical Condition (MA) Reason for Exam: facial droop-left side, h/o stroke Acuity: Acute Type of Exam: Initial Additional signs and symptoms: slurred speech Relevant Medical/Surgical History: 75ml isovue 370/ stroke alert FINDINGS: CTA NECK: AORTIC ARCH/ARCH VESSELS: No dissection or arterial injury. No significant stenosis of the brachiocephalic or subclavian arteries. CAROTID ARTERIES: No dissection, arterial injury, or hemodynamically significant stenosis by NASCET criteria.   Slight beading of the cervical segments of the internal carotid arteries could be due to fibromuscular dysplasia. VERTEBRAL ARTERIES: No dissection, arterial injury, or significant stenosis. SOFT TISSUES: The lung apices are clear. No cervical or superior mediastinal lymphadenopathy. The larynx and pharynx are unremarkable. No acute abnormality of the salivary and thyroid glands. BONES: No acute osseous abnormality. CTA HEAD: ANTERIOR CIRCULATION: No significant stenosis of the intracranial internal carotid, anterior cerebral, or middle cerebral arteries. No aneurysm. The right MCA branch supplying the region of previous infarct in the frontal lobe is attenuated. POSTERIOR CIRCULATION: Moderate stenosis of the left posterior cerebral artery P1 segment. Severe stenosis of the right posterior cerebral artery P2 segment. Basilar artery is patent. The superior cerebellar and posteroinferior cerebral arteries are patent. OTHER: No dural venous sinus thrombosis on this non-dedicated study. BRAIN: No mass effect or midline shift. No extra-axial fluid collection. The gray-white differentiation is maintained. No large vessel occlusion in the head or neck. Mild internal carotid artery fibromuscular dysplasia. Bilateral posterior cerebral artery stenosis. MRI brain without contrast    Result Date: 7/5/2021  EXAMINATION: MRI OF THE BRAIN WITHOUT CONTRAST  7/5/2021 11:18 am TECHNIQUE: Multiplanar multisequence MRI of the brain was performed without the administration of intravenous contrast. COMPARISON: CT head July 4, 2021 HISTORY: ORDERING SYSTEM PROVIDED HISTORY: facial droop/Afib/mechanical valve TECHNOLOGIST PROVIDED HISTORY: Reason for exam:->facial droop/Afib/mechanical valve Decision Support Exception - unselect if not a suspected or confirmed emergency medical condition->Emergency Medical Condition (MA) Reason for Exam: facial droop FINDINGS: Motion artifact INTRACRANIAL STRUCTURES/VENTRICLES: There is old infarction in the right frontal lobe/external capsule.   There are small old infarctions in the bilateral cerebellar hemispheres and right parietal lobe. There are small subacute infarctions at the posterior margin of the old infarction, in the right frontal lobe. There is a tiny focus of acute to subacute infarction in the superior right temporal lobe (series 4, image 18). There is moderate parenchymal volume loss. There is T2/FLAIR hyperintensity in the periventricular and subcortical white matter, likely related to mild chronic microvascular disease. No mass effect or midline shift. No evidence of an acute intracranial hemorrhage. No evidence of hydrocephalus. The sellar/suprasellar regions appear unremarkable. The normal signal voids within the major intracranial vessels appear maintained. ORBITS: The visualized portion of the orbits demonstrate no acute abnormality. SINUSES: There is scattered mild mucosal thickening in the paranasal sinuses. There is minimal right mastoid effusion. BONES/SOFT TISSUES: The bone marrow signal intensity appears normal. The soft tissues demonstrate no acute abnormality. Motion artifact. Tiny focus of acute to subacute infarction in the superior right temporal lobe Old infarction in the right frontal lobe/external capsule. Small subacute infarctions in the posterior right frontal lobe at the posterior margin of the old infarction. Small old infarctions in the bilateral cerebellar hemispheres and right parietal lobe. Moderate parenchymal volume loss. Mild chronic microvascular disease. Results were sent to sent to radiology results communication. All labs, medications and tests reviewed by myself including data  from outside source , patient and available family . Continue all other medications of all above medical condition listed as is.      Impression:  Active Problems:    Mitral valve prolapse    Afib (HCC)    H/O: CVA (cerebrovascular accident)    Atrial fibrillation and flutter ([address]    Acute CVA (cerebrovascular accident) ([address]  Resolved Problems:    * No resolved hospital problems. *      Assessment: 65 y. o.year old with PMH of  has a past medical history of Afib ([address] Anticoagulated on Coumadin, Atrial fibrillation and flutter ([address] Atrioventricular godwin re-entry tachycardia ([address] H/O echocardiogram, H/O prior ablation treatment, H/O rheumatic heart disease, H/O: CVA (cerebrovascular accident), History of mitral valve prosthesis, HTN (hypertension), Hx of cardiovascular stress test, Hx of echocardiogram, Hx of echocardiogram, Hyperlipemia, Mitral valve prolapse, S/P MVR (mitral valve repair), and VHD (valvular heart disease). Plan and Recommendations:    ACUTE CVA with subtherapeutic INR but she is supposed to be on Lovenox at home? Until INR is therapeutic  Had lengthy discussion with daughter and family at bedside that If she was not compliant with lovenox after surgery it puts her at risk of CVA  Get limited echo  Continue aspirin   DVT prophylaxis if no contraindication  6. Dyslipidemia: continue statins           Thank you  much for consult and giving us the opportunity in contributing in the care of this patient. Please feel free to call me for any questions.        Jenn Dodd MD, 7/5/2021 1:55 PM

## 2021-07-06 NOTE — PROGRESS NOTES
Cardiology Progress Note     Today's Plan echo    Admit Date:  7/4/2021    Consult reason/ Seen today for: CVA    Subjective and  Overnight Events:  Reports she is feeling better today. Speech is normal- denies any facial numbness. Telemetry atrial fib occasional PVC    Assessment / Plan / Recommendation:     1. CVA- acute ; in the setting of subtherapeutic INR- recent surgery was to be bridged pre and post surgery with Lovenox unfortunately  she did not take Lovenox at home - will check echo   2. VHD- h/o metallic mitral valve replacement- will check echo as she has subtherapeutic INR- continue with warfarin and bridging   3. Atrial fibrillation - chronic- rate is controlled -continue with metoprolol -continue with warfarn and asa   4. HTN- stable         History of Presenting Illness:    Chief complain on admission : 65 y. o.year old who is admitted for  Chief Complaint   Patient presents with    Aphasia     Stroke alert        Past medical history:    has a past medical history of Afib ([facility] [address] Anticoagulated on Coumadin, Atrial fibrillation and flutter ([address] Atrioventricular godwin re-entry tachycardia ([address] H/O echocardiogram, H/O prior ablation treatment, H/O rheumatic heart disease, H/O: CVA (cerebrovascular accident), History of mitral valve prosthesis, HTN (hypertension), Hx of cardiovascular stress test, Hx of echocardiogram, Hx of echocardiogram, Hyperlipemia, Mitral valve prolapse, S/P MVR (mitral valve repair), and VHD (valvular heart disease). Past surgical history:   has a past surgical history that includes Cardiac surgery; Cardiac valuve replacement; Tubal ligation; and Mitral valve replacement (2/11/98). Social History:   reports that she has quit smoking. She has never used smokeless tobacco. She reports that she does not drink alcohol and does not use drugs.   Family history:  family history is not on file.    Allergies   Allergen Reactions    Adenosine     Pravachol [Pravastatin Sodium] Other (See Comments)     Muscle aches, stomach ache    Statins Other (See Comments)     Causes muscle aches. Review of Systems:   All 14 systems were reviewed and are negative  Except for the positive findings which are documented     BP (!) 150/60   Pulse 63   Temp 98.1 °F (36.7 °C) (Oral)   Resp 15   Ht 5' 7\" (1.702 m)   Wt 219 lb 11.2 oz (99.7 kg)   SpO2 95%   BMI 34.41 kg/m²       Intake/Output Summary (Last 24 hours) at 7/6/2021 0908  Last data filed at 7/6/2021 0856  Gross per 24 hour   Intake 250 ml   Output --   Net 250 ml       Physical Exam:  Physical Exam  Constitutional:       Appearance: Normal appearance. HENT:      Head: Normocephalic and atraumatic. Nose: Nose normal.      Mouth/Throat:      Mouth: Mucous membranes are dry. Cardiovascular:      Rate and Rhythm: Normal rate. Pulmonary:      Effort: Pulmonary effort is normal.      Breath sounds: Normal breath sounds. Musculoskeletal:         General: Normal range of motion. Skin:     General: Skin is warm and dry. Capillary Refill: Capillary refill takes less than 2 seconds. Neurological:      Mental Status: She is alert and oriented to person, place, and time.    Psychiatric:         Mood and Affect: Mood normal.         Behavior: Behavior normal.          Medications:    hydroCHLOROthiazide  25 mg Oral Daily    sodium chloride flush  5-40 mL Intravenous 2 times per day    aspirin  81 mg Oral Daily    vitamin B-12  2,000 mcg Oral Daily    citalopram  20 mg Oral Daily    docusate sodium  100 mg Oral Nightly    enoxaparin  1 mg/kg Subcutaneous BID    warfarin  2 mg Oral Daily    sodium chloride flush  5-40 mL Intravenous 2 times per day    metoprolol tartrate  75 mg Oral TID      sodium chloride      sodium chloride       hydrALAZINE (APRESOLINE) ivpb, sodium chloride flush, sodium chloride, sodium chloride flush, sodium chloride, ondansetron **OR** ondansetron, polyethylene glycol, labetalol, oxyCODONE-acetaminophen    Lab Data:  CBC:   Recent Labs     07/04/21  1610 07/05/21  0736   WBC 7.2 7.2   HGB 10.8* 11.2*   HCT 33.8* 34.8*   MCV 89.4 87.4    205     BMP:   Recent Labs     07/04/21  1610      K 3.7      CO2 26   BUN 11   CREATININE 0.7     PT/INR:   Recent Labs     07/04/21  1610 07/05/21  0736   PROTIME 13.9 14.9*   INR 1.08 1.15     BNP:  No results for input(s): PROBNP in the last 72 hours. TROPONIN:   Recent Labs     07/04/21  1610 07/04/21  1929 07/04/21  2301   TROPONINT <0.010 <0.010 <0.010              Impression:  Active Problems:    Mitral valve prolapse    Afib (HCC)    H/O: CVA (cerebrovascular accident)    Atrial fibrillation and flutter ([address]    Acute CVA (cerebrovascular accident) ([facility] [address]  Resolved Problems:    * No resolved hospital problems. *       All labs, medications and tests reviewed by myself, continue all other medications of all above medical condition listed as is except for changes mentioned above. Thank you   Please call with questions. Electronically signed by JOSSY Navarro CNP on 7/6/2021 at 9:08 AM  [facility]    I have seen ,spoken to  and examined this patient personally, independently of the nurse practitioner. I have reviewed the hospital care given to date and reviewed all pertinent labs and imaging. The plan was developed mutually at the time of the visit with the patient,  NP   and myself. I have spoken with patient, nursing staff and provided written and verbal instructions . The above note has been reviewed and I agree with the assessment, diagnosis, and treatment plan with changes made by me as follows     HPI:  I have reviewed the above HPI  And agree with above   Payton Britt is a 65 y. o.year old who and presents with had concerns including Aphasia (Stroke alert).   Chief Complaint   Patient presents with    Aphasia     Stroke alert     Please review addendum/changes made to note above   Interval history:  CVA due to low INR       Physical Exam:  General:  Awake, alert, NAD  Head:normal  Eye:normal  Neck:  No JVD   Chest:  Clear to auscultation, respiration easy  Cardiovascular:  S1 and S2 audible, No added heart sounds, No signs of ankle edema, or volume overload, No evidence of JVD, No crackles  Abdomen:   nontender  Extremities:  No * edema  Pulses; palpable  Neuro: grossly normal      MEDICAL DECISION MAKING;    I agree with the above plan, which was planned by myself and discussed with NP.     Continue to bridge with Lovenox until INR is therapeutic can be discharged on Lovenox but needs close monitoring of INR  Echo shows normal functioning mitral valve prosthesis but dilated atrial size    Abby Colón MD [facility] 07/06/21

## 2021-07-06 NOTE — PLAN OF CARE
Problem: Falls - Risk of:  Goal: Will remain free from falls  Description: Will remain free from falls  7/6/2021 0852 by Monique Prieto LPN  Outcome: Ongoing  7/6/2021 0427 by Lewis Avila RN  Outcome: Ongoing  Goal: Absence of physical injury  Description: Absence of physical injury  7/6/2021 0852 by Monique Prieto LPN  Outcome: Ongoing  7/6/2021 0427 by Lewis Avila RN  Outcome: Ongoing     Problem: Pain:  Goal: Pain level will decrease  Description: Pain level will decrease  7/6/2021 0852 by Monique Prieto LPN  Outcome: Ongoing  7/6/2021 0427 by Lewis Avila RN  Outcome: Ongoing  Goal: Control of acute pain  Description: Control of acute pain  7/6/2021 0852 by Monique Prieto LPN  Outcome: Ongoing  7/6/2021 0427 by Lewis Avila RN  Outcome: Ongoing  Goal: Control of chronic pain  Description: Control of chronic pain  7/6/2021 0852 by Monique Prieto LPN  Outcome: Ongoing  7/6/2021 0427 by Lewis Avila RN  Outcome: Ongoing     Problem: HEMODYNAMIC STATUS  Goal: Patient has stable vital signs and fluid balance  7/6/2021 0852 by Monique Prieto LPN  Outcome: Ongoing  7/6/2021 0427 by Lewis Avila RN  Outcome: Ongoing     Problem: ACTIVITY INTOLERANCE/IMPAIRED MOBILITY  Goal: Mobility/activity is maintained at optimum level for patient  7/6/2021 0852 by Monique Prieto LPN  Outcome: Ongoing  7/6/2021 0427 by Lewis Avila RN  Outcome: Ongoing     Problem: COMMUNICATION IMPAIRMENT  Goal: Ability to express needs and understand communication  7/6/2021 0852 by Monique Prieto LPN  Outcome: Ongoing  7/6/2021 0427 by Lewis Avila RN  Outcome: Ongoing

## 2021-07-06 NOTE — CONSULTS
Neurology Service Consult Note  [facility]  Patient Name: Enolia Bamberger  : 1950        Subjective:   Reason for consult: right facial droop  65 y.o. right-handed female with PMH listed below presenting to [facility] with complaints from her  that patient had right facial droop, in  patient suffered from a large R-MCA,this is when she had a valve replacement and was found to have A. Fib. Patient is chronically on [address] [place] [address] Road, she fell recently and fractured her left wrist, she needed surgery, she was supposed to be off the coumadin then she was supposed to bridge back to coumadin with lovenox and due to miscommunication with the coumadin clinic she missed doses, she is acutely subtherapeutic INR, cardiology currently on the case. Patient denies any facial droop now and when the droop was present she denies any unilateral arm or leg weakness no dysarthria, no headache, no lightheadedness, no dizziness, no sensation changes. Back to baseline now. Reviewed MRI with patient. Past Medical History:   Diagnosis Date    Afib ([address]     ablation     Anticoagulated on Coumadin     **PCP follows pt's PT/INRs, along w/prescribing her Coumadin. **    Atrial fibrillation and flutter ([address] 2005    On Coumadin. Atrioventricular godwin re-entry tachycardia ([address]     H/O echocardiogram 10/26/2006    EF 45-50%. Mod to sev septal hypokinesis. Bi-leaflet ([facility]), mechanical prosthesis. H/O prior ablation treatment 1997    Dr. Prather [place]    H/O rheumatic heart disease     H/O: CVA (cerebrovascular accident)     History of mitral valve prosthesis     Metallic medtronic valve    HTN (hypertension)     Hx of cardiovascular stress test 2015    lexiscan-mild to moderate ischemia basal inferior and mid inferior,EF56%    Hx of echocardiogram 2015    EF. 45-50%. Moderate left atrial enlargement. Regional wall motion abnormality w/ probably mild reduction of LVSF. Normal sized abd aorta at 2.2cm.  Mild aortic appropriately    Cranial Nerve Exam:   CN II-XII:  PERRL, VFF, no nystagmus, no gaze paresis, sensation V1-V3 intact b/l, muscles of facial expression symmetric; hearing intact to conversational tone, palate elevates symmetrically, shoulder elevation symmetric and tongue protrudes midline with movement side to side. Motor Exam:       Strength 5/5 UE's/LE's b/l  Tone and bulk normal   No pronator drift  LUE cast in place     Deep Tendon Reflexes: 2/4 biceps, triceps, brachioradialis, patellar, and achilles b/l; flexor plantar responses b/l    Sensation: Intact light touch/temp UE's/LE's b/l    Coordination/Cerebellum:       Tremors--none      Rapidly alternating movements: no dysdiadochokinesia b/l                Finger-to-Nose: no dysmetria b/l    Gait and stance:      Gait: deferred      LABS:     Recent Labs     07/04/21  1610 07/04/21  1619 07/05/21  0736 07/06/21  0747   WBC 7.2  --  7.2  --      --   --   --    K 3.7  --   --   --      --   --   --    CO2 26  --   --   --    BUN 11  --   --   --    CREATININE 0.7  --   --   --    GLUCOSE 115* 123  --   --    INR 1.08  --  1.15 1.46        Results for Arleen Ceron (MRN [id number]) as of 7/6/2021 17:46   Ref. Range 7/5/2021 07:36   Cholesterol Latest Ref Range: <200 MG/   HDL Cholesterol Latest Ref Range: >40 MG/DL 51   LDL Direct Latest Ref Range: <100 MG/ (H)   Triglycerides Latest Ref Range: <150 MG/         IMAGING:      MRI brain:  Motion artifact. Tiny focus of acute to subacute infarction in the superior right temporal lobe       Old infarction in the right frontal lobe/external capsule. Small subacute   infarctions in the posterior right frontal lobe at the posterior margin of   the old infarction. Small old infarctions in the bilateral cerebellar hemispheres and right   parietal lobe. Moderate parenchymal volume loss. Mild chronic microvascular disease.      CTA:  No large vessel occlusion in the head or neck. Mild internal carotid artery fibromuscular dysplasia. Bilateral posterior cerebral artery stenosis. Limited ECHO pending     ASSESSMENT/PLAN:     65year old female with right facial droop, found to have subacute scattered infarction superimposed on R-MCA remote infarction with subtherapeutic INR and acute A. Fib and metallic MVR. Plan of care as follows:  Neuro Exam:  Non focal   Neurodiagnostics:  MRI Brain as above  CTA head and neck as above  ECHO pending   Medications:  Agree with O[facility], safe to start now   CQ10  PT/OT/ST:  Per their recommendations   Follow up:  No further recommendations from our POV        Thank you for allowing us to participate in the care of your patient. If there are any questions regarding evaluation please feel free to contact us. JOSSY Mcleod - CNP, 7/6/2021    Attending Note:  I have rounded on this patient with Heriberto Loera CNP. I have reviewed the chart and we have discussed this case in detail. The patient was seen and examined by myself. Pertinent labs and imaging have been personally reviewed. Our findings and impressions were discussed with the patient. I concur with the Nurse Practioner's assessment and plan. I reviewed her images personally and with family. Exam is nonfocal, slightly limited due to cast on lue but no weakness. Speech fluent. She wants to go home now but advised will likely need to stay until INR therapeutic or at least closer. Discussed her Echo is pending, she did have it done today. Discussed medical decision making and importance of following discharge instructions so now further stroke/misunderstanding.      Kiki Cid DO 7/6/2021 8:20 PM

## 2021-07-06 NOTE — PROGRESS NOTES
[facility] HOSPITALIST PROGRESS NOTE      PCP: Tamica Regan MD    Date of Admission: 7/4/2021    Subjective: wants to go home,    and daughter at bedside  Wants to put the brace back on that was ordered by ortho   Has been non complaint with medications in the past     [address] summary the patient is a 68-year-old female with history of A. fib, mitral valve replacement on Coumadin and history of CVA in 1997 who was admitted with left facial droop. Suspected CVA, aspirin continued, Coumadin with Lovenox bridge, MRI brain ordered  Not a candidate for TPA  MRI brain showed acute CVA, patient's family declined neurology consultation with Dr. Neema Martinez, aspirin continued, no statin secondary to allergy  Coumadin continued for mechanical mitral valve    Vitals signs:  Afebrile, heart rate 50s to 60s range, blood pressure 202/59 on admission, down to 154/62, on room air    Medications: Aspirin, citalopram, docusate, Lovenox, metoprolol, warfarin    Antibiotics: None    Fluid status: Not documented    Opioids/Benzos: Oxycodone    Labs:   Labs from admission reviewed, no labs today    Imaging:   MRI brain shows tiny focus of acute to subacute infarction in superior right temporal lobe, old infarction in the right frontal lobe/external capsule, subacute infarction in the right frontal lobe at the posterior margin of old infarction, small old infarctions in bilateral cerebral hemisphere and right parietal lobe    Assessment/Plan:     Acute on subacute CVA:   Admitted with left facial droop, history of mechanical mitral valve on Coumadin, recently stopped and was bridged with Lovenox for surgery, patient missed Lovenox at home.   Admitted with left facial droop  CTA head and neck did not show any occlusion, bilateral posterior cerebral artery stenosis seen  Aspirin started, allergic to statin  Lovenox and Coumadin restarted  Neurology consulted  MRI consistent with infarction  Check echocardiogram with bubble study to rule out embolic stroke, check troponin  PT OT consultation  Check hemoglobin A1c in a.m. Awaiting neurology consultation    History of mechanical mitral valve: Continue Lovenox bridge until INR 2.5 to 3.5 days  Continue Coumadin dosing per pharmacy    History of A. Fib: Paroxysmal  Currently normal sinus rhythm, continue metoprolol  Cardiology consulted  Echo pending    Left wrist fracture status post repair: Stable    Essential hypertension: continue metoprolol, add HCTZ  add as needed hydralazine    Mood disorder: celexa       DVT prophlaxis:   Warfarin     Last BM:   None since admission    Ambulation:   PT OT consulted    Disposition: To be decided    Diet: Low-cholesterol diet    Physical Exam Performed:       BP (!) 154/62   Pulse 56   Temp 99 °F (37.2 °C) (Oral)   Resp 14   Ht 5' 7\" (1.702 m)   Wt 219 lb 11.2 oz (99.7 kg)   SpO2 98%   BMI 34.41 kg/m²     Physical Exam  Constitutional:       Appearance: Normal appearance. HENT:      Head: Normocephalic and atraumatic. Right Ear: External ear normal.      Left Ear: External ear normal.      Nose: Nose normal.   Eyes:      Extraocular Movements: Extraocular movements intact. Pupils: Pupils are equal, round, and reactive to light. Cardiovascular:      Rate and Rhythm: Normal rate and regular rhythm. Heart sounds: Normal heart sounds. No murmur heard. Pulmonary:      Effort: Pulmonary effort is normal. No respiratory distress. Breath sounds: No wheezing. Abdominal:      General: Bowel sounds are normal. There is no distension. Palpations: Abdomen is soft. Musculoskeletal:         General: No swelling. Cervical back: Normal range of motion. Comments: Left arm in brace    Neurological:      General: No focal deficit present. Mental Status: She is alert and oriented to person, place, and time.       Comments: Mild left sided facial droop    Psychiatric:         Mood and Affect: Mood normal.         Behavior: Behavior normal.         Labs:   Recent Labs     07/04/21  1610 07/05/21  0736   WBC 7.2 7.2   HGB 10.8* 11.2*   HCT 33.8* 34.8*    205     Recent Labs     07/04/21  1610      K 3.7      CO2 26   BUN 11   CREATININE 0.7   CALCIUM 8.6     Recent Labs     07/04/21  1610   AST 17   ALT 8*   BILITOT 0.3   ALKPHOS 63     Recent Labs     07/04/21  1610 07/05/21  0736   INR 1.08 1.15     No results for input(s): Jose Hug in the last 72 hours. Urinalysis:    No results found for: Linton Arrow, BACTERIA, RBCUA, BLOODU, [address]    Radiology:  MRI brain without contrast   Final Result   Motion artifact. Tiny focus of acute to subacute infarction in the superior right temporal lobe      Old infarction in the right frontal lobe/external capsule. Small subacute   infarctions in the posterior right frontal lobe at the posterior margin of   the old infarction. Small old infarctions in the bilateral cerebellar hemispheres and right   parietal lobe. Moderate parenchymal volume loss. Mild chronic microvascular disease. Results were sent to sent to radiology results communication. XR CHEST PORTABLE   Final Result   1. Central pulmonary vascular congestion without overt pulmonary edema. CTA NECK W CONTRAST   Final Result   No large vessel occlusion in the head or neck. Mild internal carotid artery fibromuscular dysplasia. Bilateral posterior cerebral artery stenosis. CTA HEAD W CONTRAST   Final Result   No large vessel occlusion in the head or neck. Mild internal carotid artery fibromuscular dysplasia. Bilateral posterior cerebral artery stenosis. CT HEAD WO CONTRAST   Final Result   No acute intracranial abnormality. Critical results were called by Dr. Tamanna Dorsey MD to [facility] on   7/4/2021 at 16:02.                    Elia Nuñez MD

## 2021-07-06 NOTE — PROGRESS NOTES
PHARMACY ANTICOAGULATION MONITORING [address] is a 65 y.o. female on warfarin therapy for atrial fibrillation, mitral valve prosthesis (Metallic medtronic valve). Pharmacy consulted by Dr. Yessy Bach for monitoring and adjustment of treatment. Indication for anticoagulation: Atrial fibrillation, Mitral valve prosthesis (Metallic medtronic valve)  INR goal: 2.5 - 3.5  Warfarin dose prior to admission: 2 mg po daily    Pertinent Laboratory Values   Recent Labs     07/04/21  1610 07/04/21  1610 07/05/21  0736 07/05/21  0736 07/06/21  0747   INR 1.08   < > 1.15   < > 1.46   HGB 10.8*   < > 11.2*  --   --    HCT 33.8*   < > 34.8*  --   --      --  205  --   --     < > = values in this interval not displayed.        Assessment/Plan:   Enoxaparin bridge while INR subtherapeutic   INR trending up following boosted dose of 3 mg on 7/5   Continue warfarin 2mg daily   [address] will continue to monitor and adjust warfarin therapy as indicated    Thank you for the consult,  James Valero, [facility]

## 2021-07-07 NOTE — PROGRESS NOTES
Physical Therapy    Facility/Department: [facility] 3E  Initial Assessment    NAME: Holley Avila  : 1950  MRN: [id number]    Date of Service: 2021    Discharge Recommendations:  24 hour supervision or assist, Home with Home health PT       Functional Outcome Measure:    Acute Care Index of Function (ACIF):    Score: 0.94 (0.71 or greater = appropriate for home with home PT and 24 hour supervision/assist)      Assessment   Assessment: Pt is a 65 y.o. female with medical history, surgical history, co-morbidities, and personal factors including Afib ([address] Anticoagulated on Coumadin, Atrial fibrillation and flutter ([address] Atrioventricular godwin re-entry tachycardia ([address] H/O echocardiogram, H/O prior ablation treatment, H/O rheumatic heart disease, H/O: CVA (cerebrovascular accident), History of mitral valve prosthesis, HTN (hypertension), Hx of cardiovascular stress test, Hx of echocardiogram, Hx of echocardiogram, Hyperlipemia, Mitral valve prolapse, S/P MVR (mitral valve repair), VHD (valvular heart disease), Cardiac surgery; Cardiac valuve replacement; Tubal ligation; and Mitral valve replacement (98) with admission for slurred speech. MRI of brain on 21 demonstrates acute to subacute infarction in the superior right temporal lobe. Prior to admission, pt was independent with functional mobility and ADLs. Examination of body systems reveals mildly impaired balance and LUE NWB in sling which limit her overall functional mobility. Prognosis: Good  Decision Making: Medium Complexity  Clinical Presentation: evolving  PT Education: Goals;PT Role;Plan of Care;Gait Training;Equipment; Functional Mobility Training;Transfer Training;General Safety  REQUIRES PT FOLLOW UP: Yes  Activity Tolerance  Activity Tolerance: Patient Tolerated treatment well       Restrictions  Restrictions/Precautions  Restrictions/Precautions: General Precautions, Weight Bearing  Upper Extremity Weight Bearing Restrictions  Left Upper Extremity Weight Bearing: Non Weight Bearing (in sling)  Vision/Hearing  Vision: Within Functional Limits  Hearing: Within functional limits     Subjective  General  Chart Reviewed: Yes  Patient assessed for rehabilitation services?: Yes  Family / Caregiver Present: Yes  Follows Commands: Within Functional Limits  Pain Screening  Patient Currently in Pain: Yes  Pain Assessment  Pain Assessment: 0-10  Pain Level: 5  Pain Type: Acute pain  Pain Location: Arm  Pain Orientation: Left  Vital Signs  Patient Currently in Pain: Yes       Orientation  Orientation  Overall Orientation Status: Within Normal Limits  Social/Functional History  Social/Functional History  Lives With: Spouse  Type of Home: House  Home Layout: Two level  Home Access: Stairs to enter without rails  Entrance Stairs - Number of Steps: 4  Bathroom Shower/Tub: Walk-in shower  Bathroom Toilet: Standard  Bathroom Equipment: Tub transfer bench  ADL Assistance: Independent  Homemaking Assistance: Independent  Homemaking Responsibilities: Yes  Ambulation Assistance: Independent  Transfer Assistance: Independent  Active : Yes  Mode of Transportation: Car  Occupation: Retired  Additional Comments: Pt reports about 2 falls in last 6 months  Cognition   Cognition  Overall Cognitive Status: WNL    Objective             Strength RLE  Comment: knee extension: 4+/5, ankle DF: 5/5  Strength LLE  Comment: knee extension: 4+/5, ankle DF: 5/5     Sensation  Overall Sensation Status: WNL  Bed mobility  Rolling to Left: Independent  Rolling to Right: Independent  Supine to Sit: Independent  Sit to Supine: Independent  Transfers  Sit to Stand: Independent  Stand to sit:  Independent  Ambulation  Ambulation?: Yes  Ambulation 1  Surface: level tile  Device: No Device  Assistance: Stand by assistance;Supervision  Quality of Gait: decreased gait speed, decreased step length bilaterally  Distance: 100 feet + 100 feet  Comments: with initial verbal cues for directions in order to successfully navigate hallway and return to correct room     Balance  Posture: Fair  Sitting - Static: Good  Sitting - Dynamic: Good  Standing - Static: Good  Standing - Dynamic: Good;-      Gait Training:  Cues were given for safety, sequence, device management, balance, posture, awareness, path. Therapeutic Activity Training:   Therapeutic activity training was instructed today. Pt educated on and demonstrated understanding of importance of regular OOB mobility/ambulation with nursing staff and/or therapy staff throughout remainder of hospital stay. Increased time required for all task completion (rajesh esquivel). Plan   Plan  Times per week: 3+  Current Treatment Recommendations: Strengthening, ROM, Balance Training, Functional Mobility Training, Transfer Training, ADL/Self-care Training, Gait Training, IADL Training, Stair training, Patient/Caregiver Education & Training, Equipment Evaluation, Education, & procurement, Neuromuscular Re-education, Pain Management, Home Exercise Program, Positioning, Endurance Training, Safety Education & Training  Safety Devices  Type of devices: All fall risk precautions in place, Left in chair, Call light within reach, Chair alarm in place, Nurse notified, Gait belt      AM-PAC Score  AM-PAC Inpatient Mobility Raw Score : 22 (07/07/21 1419)  AM-PAC Inpatient T-Scale Score : 53.28 (07/07/21 1419)  Mobility Inpatient CMS 0-100% Score: 20.91 (07/07/21 1419)  Mobility Inpatient CMS G-Code Modifier : Kirt Billi (07/07/21 1419)          Goals  Long term goals  Time Frame for Long term goals :  In one week:  Long term goal 1: Pt will ambulate 500 feet independently  Long term goal 2: Pt will independently ascend/descend 6 steps with a handrail  Long term goal 3: Pt will independently complete 3 sets of 10 reps of BLE AROM exercises in available and allowed ROM       Time In:  1230  Time Out: 1313  Total Treatment Time: 43  Timed Code Treatment Minutes: 559 Ava Chappell DPT  License #: [id number]

## 2021-07-07 NOTE — DISCHARGE SUMMARY
Hospitalist  Discharge Summary     Patient ID:  Princess Ratliff  [id number]  64 y.o.  1950    Admit date: 7/4/2021    Discharge date and time:  7/7/2021  11:14 AM    Discharge Diagnoses:   1. Acute multifocal ischemic infarct- likely cardioembolic  2. Subtherapeutic INR  3. S/P Mitral valve replacement  4. HTN  5. A-fib   6. Obesity        Hospital Course:   65year old woman with history of CVA, mitral valve replacement, and A-fib on coumadin. She had Upper extremity surgery on 7/1/21, and was supposed to be on coumadin and wafarin bridge. However, there appears to have been a misunderstanding and she was taking only her coumadin. She presentd to the ER on 77/4/21 with 30mins of slurred speech. Stroke alert was called, CT head non-acute, INR subtherapeutic. She was reviewed by Magali Garcia, and she was not a candidate for TPA. Slurred speech progressively improved in the ER. She was hospitalized for further management. MRI revealed tiny focus of acute to subacute infarct in the superior right temporal lobe, and small subacute infarct in the posterior right frontal lobe at the posterior margin of an old infarct. Echo was unremarkable. Bridging with lovenox and coumadin was initiated on admission, and has been continued. INR today os 1.54. She has been advised on the importance of lovenox and coumadin bridge until therapeutic INR. She acknowledged understanding, including the risk of not doing so. PCP informed and she will monitor INR and lovenox bridge after discharge. Blood pressure was uncontrolled, upto 481 systolic . Amlodipine and HCT were added.         Discharge Medications     Medication List      START taking these medications    amLODIPine 10 MG tablet  Commonly known as: NORVASC  Take 1 tablet by mouth daily     hydroCHLOROthiazide 25 MG tablet  Commonly known as: HYDRODIURIL  Take 1 tablet by mouth daily  Start taking on: July 8, 2021        CONTINUE taking these medications aspirin 81 MG tablet     Cetirizine HCl 10 MG Caps     citalopram 40 MG tablet  Commonly known as: CELEXA     docusate sodium 100 MG capsule  Commonly known as: COLACE     enoxaparin 100 MG/ML injection  Commonly known as: Lovenox  Inject 1 mL into the skin 2 times daily     HYDROcodone-acetaminophen 5-325 MG per tablet  Commonly known as: NORCO     * metoprolol tartrate 25 MG tablet  Commonly known as: LOPRESSOR     * metoprolol tartrate 50 MG tablet  Commonly known as: LOPRESSOR     Vitamin B-12 2000 MCG Tbcr     Vitamin D 1000 units Caps capsule  Commonly known as: CHOLECALCIFEROL     warfarin 2 MG tablet  Commonly known as: COUMADIN         * This list has 2 medication(s) that are the same as other medications prescribed for you. Read the directions carefully, and ask your doctor or other care provider to review them with you. Where to Get Your Medications      You can get these medications from any pharmacy    Bring a paper prescription for each of these medications  · amLODIPine 10 MG tablet  · enoxaparin 100 MG/ML injection  · hydroCHLOROthiazide 25 MG tablet           Discharge Exam:  General Appearance: alert and oriented to person, place and time  Skin: warm and dry, no rash or erythema  Head: normocephalic and atraumatic  Eyes: pupils equal, round, and reactive to light,   Neck: neck supple and non tender   Pulmonary/Chest: clear to auscultation bilaterally  Cardiovascular: normal rate, normal S1 and S2,   Abdomen: soft, non-tender, non-distended, normal bowel sounds,   Extremities: no edema  Neurologic: Non focal     Vitals:    Vitals:    07/07/21 0217 07/07/21 0530 07/07/21 0707 07/07/21 0900   BP: (!) 186/78  (!) 132/59 (!) 166/72   Pulse: 63 55 59 60   Resp: 17 14 17 16   Temp: 98.6 °F (37 °C)   98.4 °F (36.9 °C)   TempSrc: Oral   Oral   SpO2: 96%   97%   Weight:       Height:           I/O last 3 completed shifts:   In: 10 [I.V.:10]  Out: -   I/O this shift:  In: 10 [I.V.:10]  Out: - LABS:  Recent Labs     07/04/21  1610 07/04/21  1619 07/07/21  0655     --  140   K 3.7  --  3.6     --  105   CO2 26  --  28   BUN 11  --  7   CREATININE 0.7  --  0.6   GLUCOSE 115* 123 119*   CALCIUM 8.6  --  9.0       Recent Labs     07/04/21  1610 07/05/21  0736   WBC 7.2 7.2   RBC 3.78* 3.98*   HGB 10.8* 11.2*   HCT 33.8* 34.8*   MCV 89.4 87.4   MCH 28.6 28.1   MCHC 32.0 32.2   RDW 14.4 14.3    205   MPV 9.2 9.4       No results for input(s): GLUMET in the last 72 hours. Imaging:    MRI brain without contrast   Final Result   Motion artifact. Tiny focus of acute to subacute infarction in the superior right temporal lobe      Old infarction in the right frontal lobe/external capsule. Small subacute   infarctions in the posterior right frontal lobe at the posterior margin of   the old infarction. Small old infarctions in the bilateral cerebellar hemispheres and right   parietal lobe. Moderate parenchymal volume loss. Mild chronic microvascular disease. Results were sent to sent to radiology results communication. XR CHEST PORTABLE   Final Result   1. Central pulmonary vascular congestion without overt pulmonary edema. CTA NECK W CONTRAST   Final Result   No large vessel occlusion in the head or neck. Mild internal carotid artery fibromuscular dysplasia. Bilateral posterior cerebral artery stenosis. CTA HEAD W CONTRAST   Final Result   No large vessel occlusion in the head or neck. Mild internal carotid artery fibromuscular dysplasia. Bilateral posterior cerebral artery stenosis. CT HEAD WO CONTRAST   Final Result   No acute intracranial abnormality. Critical results were called by Dr. Rianna Byrd MD to [facility], [place] on   7/4/2021 at 16:02. Follow up:  Harvey Paz MD  [place] [id number] [phone]            Patient Instructions:    Activity level: as tolerated   Diet: regular diet      Dispo: home     Condition at discharge stable    Note that 45 minutes was spent in preparing discharge papers, discussing discharge with patient, medication review, etc.    Signed:  Electronically signed by Dalia Scott MD on 7/7/2021 at 11:14 AM

## 2021-07-07 NOTE — PROGRESS NOTES
Cardiology Progress Note     Today's Plan: will sign off and be available if needed  Will need to go home on lovenox until INR is therapeutic on warfarin    Admit Date:  7/4/2021    Consult reason/ Seen today for: CVA    Subjective and  Overnight Events:  Up in chair this am- reports she is feeling well and wanting to go home. Telemetry atrial fib / bradycardia     Assessment / Plan / Recommendation:     1. CVA- acute ; in the setting of subtherapeutic INR- recent surgery was to be bridged pre and post surgery with Lovenox unfortunately  she did not take Lovenox at home -   2. Continue Lovenox until INR becomes therapeutic normal INR followed by PCP  3. VHD- h/o metallic mitral valve replacement-  Echo completed- elevated gradient across the mitral valve- will continue to follow -  continue with warfarin and bridging with lovenox   4. Atrial fibrillation - chronic- rate is controlled -continue with metoprolol -continue with warfarn and asa   5. HTN- stable         History of Presenting Illness:    Chief complain on admission : 65 y. o.year old who is admitted for  Chief Complaint   Patient presents with    Aphasia     Stroke alert        Past medical history:    has a past medical history of Afib ([address] Anticoagulated on Coumadin, Atrial fibrillation and flutter ([address] Atrioventricular godwin re-entry tachycardia ([address] H/O echocardiogram, H/O prior ablation treatment, H/O rheumatic heart disease, H/O: CVA (cerebrovascular accident), History of mitral valve prosthesis, HTN (hypertension), Hx of cardiovascular stress test, Hx of echocardiogram, Hx of echocardiogram, Hyperlipemia, Mitral valve prolapse, S/P MVR (mitral valve repair), and VHD (valvular heart disease).   Past surgical history:   has a past surgical history that includes Cardiac surgery; Cardiac valuve replacement; Tubal ligation; and Mitral valve replacement (2/11/98). Social History:   reports that she has quit smoking. She has never used smokeless tobacco. She reports that she does not drink alcohol and does not use drugs. Family history:  family history is not on file. Allergies   Allergen Reactions    Adenosine     Pravachol [Pravastatin Sodium] Other (See Comments)     Muscle aches, stomach ache    Statins Other (See Comments)     Causes muscle aches. Review of Systems:   All 14 systems were reviewed and are negative  Except for the positive findings which are documented     BP (!) 166/72   Pulse 60   Temp 98.4 °F (36.9 °C) (Oral)   Resp 16   Ht 5' 7\" (1.702 m)   Wt 219 lb 11.2 oz (99.7 kg)   SpO2 97%   BMI 34.41 kg/m²       Intake/Output Summary (Last 24 hours) at 7/7/2021 1143  Last data filed at 7/7/2021 0913  Gross per 24 hour   Intake 10 ml   Output --   Net 10 ml       Physical Exam:  Physical Exam  Constitutional:       Appearance: Normal appearance. HENT:      Head: Normocephalic and atraumatic. Nose: Nose normal.      Mouth/Throat:      Mouth: Mucous membranes are dry. Cardiovascular:      Rate and Rhythm: Bradycardia present. Rhythm irregular. Pulmonary:      Effort: Pulmonary effort is normal.      Breath sounds: Normal breath sounds. Musculoskeletal:         General: Normal range of motion. Comments: Left lower arm in cast   Skin:     General: Skin is warm and dry. Capillary Refill: Capillary refill takes less than 2 seconds. Neurological:      Mental Status: She is alert and oriented to person, place, and time.    Psychiatric:         Mood and Affect: Mood normal.         Behavior: Behavior normal.          Medications:    hydroCHLOROthiazide  25 mg Oral Daily    cefTRIAXone (ROCEPHIN) IV  1,000 mg Intravenous Q24H    sodium chloride flush  5-40 mL Intravenous 2 times per day    aspirin  81 mg Oral Daily    vitamin B-12  2,000 mcg Oral Daily    citalopram  20 mg Oral Daily    docusate sodium  100 mg Oral Nightly    enoxaparin  1 mg/kg Subcutaneous BID    warfarin  2 mg Oral Daily    sodium chloride flush  5-40 mL Intravenous 2 times per day    metoprolol tartrate  75 mg Oral TID      sodium chloride      sodium chloride       hydrALAZINE (APRESOLINE) ivpb, sodium chloride flush, sodium chloride, sodium chloride flush, sodium chloride, ondansetron **OR** ondansetron, polyethylene glycol, labetalol, oxyCODONE-acetaminophen    Lab Data:  CBC:   Recent Labs     07/04/21  1610 07/05/21  0736   WBC 7.2 7.2   HGB 10.8* 11.2*   HCT 33.8* 34.8*   MCV 89.4 87.4    205     BMP:   Recent Labs     07/04/21  1610 07/07/21  0655    140   K 3.7 3.6    105   CO2 26 28   BUN 11 7   CREATININE 0.7 0.6     PT/INR:   Recent Labs     07/05/21  0736 07/06/21  0747 07/07/21  0655   PROTIME 14.9* 18.9* 20.0*   INR 1.15 1.46 1.54     BNP:  No results for input(s): PROBNP in the last 72 hours. TROPONIN:   Recent Labs     07/04/21  1610 07/04/21  1929 07/04/21  2301   TROPONINT <0.010 <0.010 <0.010              Impression:  Principal Problem:    Acute CVA (cerebrovascular accident) [facility])  Active Problems:    Mitral valve prolapse    Afib ([address]    H/O: CVA (cerebrovascular accident)    Anticoagulated on Coumadin    Atrial fibrillation and flutter ([address]  Resolved Problems:    * No resolved hospital problems. *       All labs, medications and tests reviewed by myself, continue all other medications of all above medical condition listed as is except for changes mentioned above. Thank you   Please call with questions. Electronically signed by JOSSY Rizvi CNP on 7/7/2021 at 11:43 AM  CARDIOLOGY ATTENDING ADDENDUM    I have seen ,spoken to  and examined this patient personally, independently of the nurse practitioner. I have reviewed the hospital care given to date and reviewed all pertinent labs and imaging. The plan was developed mutually at the time of the visit with the patient,  NP   and myself.  I have spoken with patient, nursing staff and provided written and verbal instructions . The above note has been reviewed and I agree with the assessment, diagnosis, and treatment plan with changes made by me as follows     HPI:  I have reviewed the above HPI  And agree with above   Iwona Eugene is a 65 y. o.year old who and presents with had concerns including Aphasia (Stroke alert). Chief Complaint   Patient presents with    Aphasia     Stroke alert     Please review addendum/changes made to note above   Interval history:  CVA due to low INR       Physical Exam:  General:  Awake, alert, NAD  Head:normal  Eye:normal  Neck:  No JVD   Chest:  Clear to auscultation, respiration easy  Cardiovascular:  S1 and S2 audible, No added heart sounds, No signs of ankle edema, or volume overload, No evidence of JVD, No crackles  Abdomen:   nontender  Extremities:  No * edema  Pulses; palpable  Neuro: grossly normal      MEDICAL DECISION MAKING;    I agree with the above plan, which was planned by myself and discussed with NP.     Continue to bridge with Lovenox until INR is therapeutic can be discharged on Lovenox but needs close monitoring of INR  Echo shows normal functioning mitral valve prosthesis but dilated atrial size  Will sign off call with questions  Discussed with patient family as well as primary team  Jennie Calles MD [facility] [place] 07/07/21

## 2021-07-07 NOTE — PLAN OF CARE
Problem: Falls - Risk of:  Goal: Will remain free from falls  Description: Will remain free from falls  7/7/2021 1448 by Hermes Bella LPN  Outcome: Ongoing  7/7/2021 1333 by Hermes Bella LPN  Outcome: Ongoing  7/7/2021 0506 by Mauricio Weiss RN  Outcome: Ongoing  Goal: Absence of physical injury  Description: Absence of physical injury  7/7/2021 1448 by Hermes Bella LPN  Outcome: Ongoing  7/7/2021 1333 by Hermes Bella LPN  Outcome: Ongoing  7/7/2021 0506 by Mauricio Weiss RN  Outcome: Ongoing     Problem: Pain:  Goal: Pain level will decrease  Description: Pain level will decrease  7/7/2021 1448 by Hermes Bella LPN  Outcome: Ongoing  7/7/2021 1333 by Hermes Bella LPN  Outcome: Ongoing  7/7/2021 0506 by Mauricio Weiss RN  Outcome: Ongoing  Goal: Control of acute pain  Description: Control of acute pain  7/7/2021 1448 by Hermes Bella LPN  Outcome: Ongoing  7/7/2021 1333 by Hermes Bella LPN  Outcome: Ongoing  7/7/2021 0506 by Mauricio Weiss RN  Outcome: Ongoing  Goal: Control of chronic pain  Description: Control of chronic pain  7/7/2021 1448 by Hermes Bella LPN  Outcome: Ongoing  7/7/2021 1333 by Hermes Bella LPN  Outcome: Ongoing  7/7/2021 0506 by Mauricio Weiss RN  Outcome: Ongoing     Problem: HEMODYNAMIC STATUS  Goal: Patient has stable vital signs and fluid balance  7/7/2021 1448 by Hermes Bella LPN  Outcome: Ongoing  7/7/2021 1333 by Hermes Bella LPN  Outcome: Ongoing  7/7/2021 0506 by Mauricio Weiss RN  Outcome: Ongoing     Problem: ACTIVITY INTOLERANCE/IMPAIRED MOBILITY  Goal: Mobility/activity is maintained at optimum level for patient  7/7/2021 1448 by Hermes Bella LPN  Outcome: Ongoing  7/7/2021 1333 by Hermes Bella LPN  Outcome: Ongoing  7/7/2021 0506 by Mauricio Weiss RN  Outcome: Ongoing     Problem: COMMUNICATION IMPAIRMENT  Goal: Ability to express needs and understand communication  7/7/2021 1448 by Hermes Bella LPN  Outcome:

## 2021-07-07 NOTE — PROGRESS NOTES
Alexis Chiu consulted by Dr. Pacheco Cortez for monitoring and adjustment of treatment. Indication for anticoagulation: A fib, mechanical mitral valve, new CVA  INR goal: 2.5 - 3.5  Warfarin dose prior to admission: reported as 2 mg po daily    Pertinent Laboratory Values   Recent Labs     07/04/21  1610 07/04/21  1610 07/05/21  0736 07/06/21  0747 07/07/21  0655   INR 1.08   < > 1.15   < > 1.54   HGB 10.8*   < > 11.2*  --   --    HCT 33.8*   < > 34.8*  --   --      --  205  --   --     < > = values in this interval not displayed.        Assessment/Plan:   INR sub-therapeutic on admission   o Recent surgery, planned for bridging pre- and post-procedure, did not receive enoxaparin Enoxaparin bridge while INR subtherapeutic   INR sub-therapeutic, trending up after resuming home regimen    Continue warfarin 2 mg daily, plan for enoxaparin bridge while INR sub-therapeutic    Pharmacy will continue to monitor and adjust warfarin therapy as indicated    Thank you for the consult,  Mayo Head, [facility]

## 2021-07-07 NOTE — PLAN OF CARE
Problem: Falls - Risk of:  Goal: Will remain free from falls  Description: Will remain free from falls  7/7/2021 1333 by Tomasita Severs, LPN  Outcome: Ongoing  7/7/2021 0506 by Mark Law RN  Outcome: Ongoing  Goal: Absence of physical injury  Description: Absence of physical injury  7/7/2021 1333 by Tomasita Severs, LPN  Outcome: Ongoing  7/7/2021 0506 by Mark Law RN  Outcome: Ongoing     Problem: Pain:  Goal: Pain level will decrease  Description: Pain level will decrease  7/7/2021 1333 by Tomasita Severs, LPN  Outcome: Ongoing  7/7/2021 0506 by Mark Law RN  Outcome: Ongoing  Goal: Control of acute pain  Description: Control of acute pain  7/7/2021 1333 by Tomasita Severs, LPN  Outcome: Ongoing  7/7/2021 0506 by Mark Law RN  Outcome: Ongoing  Goal: Control of chronic pain  Description: Control of chronic pain  7/7/2021 1333 by Tomasita Severs, LPN  Outcome: Ongoing  7/7/2021 0506 by Mark Law RN  Outcome: Ongoing     Problem: HEMODYNAMIC STATUS  Goal: Patient has stable vital signs and fluid balance  7/7/2021 1333 by Tomasita Severs, LPN  Outcome: Ongoing  7/7/2021 0506 by Mark Law RN  Outcome: Ongoing     Problem: ACTIVITY INTOLERANCE/IMPAIRED MOBILITY  Goal: Mobility/activity is maintained at optimum level for patient  7/7/2021 1333 by Tomasita Severs, LPN  Outcome: Ongoing  7/7/2021 0506 by Mark Law RN  Outcome: Ongoing     Problem: COMMUNICATION IMPAIRMENT  Goal: Ability to express needs and understand communication  7/7/2021 1333 by Tomasita Severs, LPN  Outcome: Ongoing  7/7/2021 0506 by Mark Law RN  Outcome: Ongoing

## 2021-07-07 NOTE — FLOWSHEET NOTE
07/07/21 1529   Encounter Summary   Services provided to: Patient not available   Referral/Consult From: [address] [place] [address]; Children   Continue Visiting Yes  (patient was not available at the time of visit)   Complexity of Encounter Low   Routine   Type Initial   Assessment Unable to respond

## 2021-09-21 PROBLEM — I10 ESSENTIAL HYPERTENSION: Status: ACTIVE | Noted: 2021-01-01

## 2021-09-21 NOTE — PROGRESS NOTES
Patient has not taken Norvasc or HCTZ since she was in [facility] in July. Patient states she was probably given a 30 day script and after it ran out, was never refilled.

## 2021-09-21 NOTE — ASSESSMENT & PLAN NOTE
Compliance was strongly enforced advised to watch Coumadin and INR levels with the therapeutic goal of at least INR of 2.5-3.5 range with aspirin 81 mg daily her INR is followed very closely with Kathryn Elizondo.   I have reiterated that if she needs her Coumadin held for any procedure she needs overlapping Lovenox

## 2021-09-21 NOTE — PROGRESS NOTES
CARDIOLOGY  NOTE    Chief Complaint: Atrial fibrillation mechanical mitral valve prosthesis    HPI:   Valarie Salazar is a 65y.o. year old who has Past medical history as noted below. She comes in after recent hospitalization due to stroke with her daughter. She was last seen by cardiology in 2015 and then was lost to follow-up has been on Coumadin due to history of mechanical mitral valve prosthesis. She was supposed to be on Coumadin and Lovenox overlap to undergo surgery but somehow there was miscommunication and she was not taking Lovenox unfortunately resulted in tiny small infarct based on MRI in July 2021  Her EKG shows A. fib but she is not aware about it she is supposed to be on 75 mg of metoprolol 3 times a day but has difficulty taking it she is tired and fatigued she stopped taking hydrochlorothiazide and Norvasc on her own after discharge when she ran out of medication. Is with her daughter here was quite frustrated the patient is tired and is not being \"shocked back into sinus rhythm in spite of being in A. fib although in the past this has been a big deal\"  Valarie Salazar has history of mechanical mitral prosthesis with a redo valve in 1991 she has history of atrial fibrillation and multiple strokes.       Current Outpatient Medications   Medication Sig Dispense Refill    metoprolol tartrate (LOPRESSOR) 25 MG tablet Take 1 tablet by mouth 2 times daily 60 tablet 3    metoprolol tartrate (LOPRESSOR) 50 MG tablet Take 1 tablet by mouth 2 times daily 60 tablet 3    amLODIPine (NORVASC) 5 MG tablet Take 1 tablet by mouth daily 30 tablet 5    HYDROcodone-acetaminophen (NORCO) 5-325 MG per tablet TAKE 1 TABLET BY MOUTH EVERY SIX HOURS AS NEEDED      Cyanocobalamin (VITAMIN B-12) 2000 MCG TBCR Take 1 tablet by mouth daily      Vitamin D (CHOLECALCIFEROL) 1000 UNITS CAPS capsule Take 1,000 Units by mouth daily      aspirin 81 MG tablet Take 81 mg by mouth daily      warfarin (COUMADIN) 2 MG tablet Take 2 mg by mouth daily Indications: **PCP followings pt's PT/INRs, along w/prescribing her Coumadin. **       citalopram (CELEXA) 20 MG tablet Take 20 mg by mouth daily       Cetirizine HCl 10 MG CAPS Take 1 tablet by mouth daily      docusate sodium (COLACE) 100 MG capsule Take 100 mg by mouth nightly       No current facility-administered medications for this visit. Allergies:   Adenosine, Pravachol [pravastatin sodium], and Statins    Patient History:  Past Medical History:   Diagnosis Date    Afib ([address]     ablation 1997    Anticoagulated on Coumadin     **PCP follows pt's PT/INRs, along w/prescribing her Coumadin. **    Atrial fibrillation and flutter ([address] 01/01/2005    On Coumadin.  Atrioventricular godwin re-entry [facility]cardia [facility]     H/O echocardiogram 10/26/2006    EF 45-50%. Mod to sev septal hypokinesis. Bi-leaflet ([facility]), mechanical prosthesis.  H/O prior ablation treatment 01/01/1997    Dr. Daniel Cowan H/O rheumatic heart disease     H/O: CVA (cerebrovascular accident)     History of mitral valve prosthesis     Metallic medtronic valve    HTN (hypertension)     Hx of cardiovascular stress test 09/30/2015    lexiscan-mild to moderate ischemia basal inferior and mid inferior,EF56%    Hx of echocardiogram 09/30/2015    EF. 45-50%. Moderate left atrial enlargement. Regional wall motion abnormality w/ probably mild reduction of LVSF. Normal sized abd aorta at 2.2cm. Mild aortic regurgitation.  Hx of echocardiogram 06/22/2021    50-55%. mechanical prosthetic valve is present with a meangradient of 2mmHg    Hyperlipemia     Mitral valve prolapse     S/P MVR (mitral valve repair) 12/23/1991    VHD (valvular heart disease) 01/01/1998     Past Surgical History:   Procedure Laterality Date    CARDIAC SURGERY      CARDIAC VALVE SURGERY      MITRAL VALVE REPLACEMENT  2/11/98    [employer] serial # [id number], model #     TUBAL LIGATION History reviewed. No pertinent family history. Social History     Tobacco Use    Smoking status: Former Smoker    Smokeless tobacco: Never Used   Substance Use Topics    Alcohol use: No     Alcohol/week: 0.0 standard drinks        Review of Systems:   · Constitutional: No Fever or Weight Loss   · Eyes: No Decreased Vision  · ENT: No Headaches, Hearing Loss or Vertigo  · Cardiovascular: as per note above   · Respiratory: No cough or wheezing and as per note above. · Gastrointestinal: No abdominal pain, appetite loss, blood in stools, constipation, diarrhea or heartburn  · Genitourinary: No dysuria, trouble voiding, or hematuria  · Musculoskeletal:  denies any new  joint aches , swelling  or pain   · Integumentary: No rash or pruritis  · Neurological: No TIA or stroke symptoms  · Psychiatric: No anxiety or depression  · Endocrine: No malaise, fatigue or temperature intolerance  · Hematologic/Lymphatic: No bleeding problems, blood clots or swollen lymph nodes  · Allergic/Immunologic: No nasal congestion or hives    Objective:      Physical Exam:  BP (!) 140/84 (Site: Left Upper Arm, Position: Sitting, Cuff Size: Large Adult)   Pulse 60   Ht 5' 7\" (1.702 m)   Wt 212 lb 9.6 oz (96.4 kg)   BMI 33.30 kg/m²   Wt Readings from Last 3 Encounters:   09/21/21 212 lb 9.6 oz (96.4 kg)   07/06/21 219 lb 11.2 oz (99.7 kg)   06/22/21 212 lb (96.2 kg)     Body mass index is 33.3 kg/m². Vitals:    09/21/21 1227   BP: (!) 140/84   Pulse: 60        General Appearance:  No distress, conversant  Constitutional:  Well developed, Well nourished, No acute distress, Non-toxic appearance. HENT:  Normocephalic, Atraumatic, Bilateral external ears normal, Oropharynx moist, No oral exudates, Nose normal. Neck- Normal range of motion, No tenderness, Supple, No stridor,no apical-carotid delay  Eyes:  PERRL, EOMI, Conjunctiva normal, No discharge.    Respiratory:  Normal breath sounds, No respiratory distress, No wheezing, No chest instructions given by myself       Counseled extensively and medication compliance urged. We discussed that for the  prevention of ASCVD our  goal is aggressive risk modification. Patient is encouraged to exercise if they can , educated about  brisk walk for 30 minutes  at least 3 to 4 times a week if there are no physical limitations  Various goals were discussed and questions answered. Continue current medications. Appropriate prescriptions are addressed and refills ordered. Questions answered and patient verbalizes understanding. Call for any problems, questions, or concerns. Greater than 60 % of time spent counseling besides reviewing data and images     Continue all other medications of all above medical condition listed as is. Return in about 1 month (around 10/21/2021). Please note this report has been partially produced using speech recognition software and may contain errors related to that system including errors in grammar, punctuation, and spelling, as well as words and phrases that may be inappropriate. If there are any questions or concerns please feel free to contact the dictating provider for clarification.

## 2021-09-21 NOTE — ASSESSMENT & PLAN NOTE
Her EKG in July showed she is in A. fib she is states that in the past when she was in A. fib she knew it and felt it but at this time she is not aware that she is in A. fib. Her heart rate is in 60s she is tired advised to cut down on metoprolol to 75 mg twice a day with heart rate goal of 80s to 100s.   At this stage she is less likely to stay in sinus rhythm I am not sure how long she has been in A. fib as there is no EKG between 2017 and 2021

## 2021-09-21 NOTE — ASSESSMENT & PLAN NOTE
Advised to check blood pressure at home keep a log start Norvasc 5 mg daily since we are currently lowering metoprolol 25 mg twice a day instead of 3 times a day I will reevaluate in 1 month's time

## 2021-12-08 NOTE — TELEPHONE ENCOUNTER
Low risk for procedure proceed without any further cardiac testing hold anticoagulation for procedure

## 2021-12-08 NOTE — TELEPHONE ENCOUNTER
Cardiologist: Dr. Miranda Claudio  Surgeon: Dr. RAMOS [facility]  Surgery: Right elbow ORIF hardware removal  Anesthesia: General  Date: 12/14/2021  FAX# [phone]  # [phone]    Last OV 9/21/2021 w/Joni      History of mitral valve prosthesis  Compliance was strongly enforced advised to watch Coumadin and INR levels with the therapeutic goal of at least INR of 2.5-3.5 range with aspirin 81 mg daily her INR is followed very closely with Rubén Wallis. I have reiterated that if she needs her Coumadin held for any procedure she needs overlapping Lovenox     Atrial fibrillation and flutter ([address]  Her EKG in July showed she is in A. fib she is states that in the past when she was in A. fib she knew it and felt it but at this time she is not aware that she is in A. fib. Her heart rate is in 60s she is tired advised to cut down on metoprolol to 75 mg twice a day with heart rate goal of 80s to 100s. At this stage she is less likely to stay in sinus rhythm I am not sure how long she has been in A. fib as there is no EKG between 2017 and 2021  We will reevaluate in 1 month to debate about cardioversion? I doubt that she will stay in sinus rhythm     Essential hypertension  Advised to check blood pressure at home keep a log start Norvasc 5 mg daily since we are currently lowering metoprolol 25 mg twice a day instead of 3 times a day I will reevaluate in 1 month's time      Dyslipidemia :  All available lab work was reviewed. Patient was advised to repeat lab work before next visit. Necessary orders were placed , instructions given by myself       NM- 9/30/2015      Echo- 6/22/2021   Left ventricular function is low normal, EF is estimated at 50-55%. Left ventricular size is mildly increased . Mild left ventricular hypertrophy. Diastolic dysfunction could not be evaluated due to MVR. Bi atrial enlargement noted. Aortic valve leaflets are somewhat thickened.    Normally functioning mechanical prosthetic valve is present with a mean   gradient of 2mmHg. Mild tricuspid and aortic regurgitation. RVSP is 38 mmHg. No evidence of pericardial effusion.       Mitral Valve replacement- 2/1998      Aspirin

## 2022-01-01 ENCOUNTER — APPOINTMENT (OUTPATIENT)
Dept: CT IMAGING | Age: 72
DRG: 208 | End: 2022-01-01
Payer: MEDICARE

## 2022-01-01 ENCOUNTER — APPOINTMENT (OUTPATIENT)
Dept: GENERAL RADIOLOGY | Age: 72
DRG: 208 | End: 2022-01-01
Payer: MEDICARE

## 2022-01-01 ENCOUNTER — ANESTHESIA EVENT (OUTPATIENT)
Dept: EMERGENCY DEPT | Age: 72
DRG: 208 | End: 2022-01-01
Payer: MEDICARE

## 2022-01-01 ENCOUNTER — HOSPITAL ENCOUNTER (INPATIENT)
Age: 72
LOS: 4 days | DRG: 208 | End: 2022-01-12
Attending: EMERGENCY MEDICINE | Admitting: STUDENT IN AN ORGANIZED HEALTH CARE EDUCATION/TRAINING PROGRAM
Payer: MEDICARE

## 2022-01-01 ENCOUNTER — ANESTHESIA (OUTPATIENT)
Dept: EMERGENCY DEPT | Age: 72
DRG: 208 | End: 2022-01-01
Payer: MEDICARE

## 2022-01-01 VITALS
HEIGHT: 67 IN | DIASTOLIC BLOOD PRESSURE: 57 MMHG | SYSTOLIC BLOOD PRESSURE: 137 MMHG | OXYGEN SATURATION: 96 % | BODY MASS INDEX: 33.11 KG/M2 | TEMPERATURE: 98.6 F | WEIGHT: 210.98 LBS

## 2022-01-01 DIAGNOSIS — E87.6 HYPOKALEMIA: ICD-10-CM

## 2022-01-01 DIAGNOSIS — E87.8 LOW BICARBONATE LEVEL: ICD-10-CM

## 2022-01-01 DIAGNOSIS — U07.1 COVID-19: ICD-10-CM

## 2022-01-01 DIAGNOSIS — I46.9 CARDIAC ARREST (HCC): Primary | ICD-10-CM

## 2022-01-01 LAB
ABO/RH: NORMAL
ALBUMIN SERPL-MCNC: 2.5 GM/DL (ref 3.4–5)
ALBUMIN SERPL-MCNC: 3 GM/DL (ref 3.4–5)
ALP BLD-CCNC: 59 IU/L (ref 40–129)
ALP BLD-CCNC: 65 IU/L (ref 40–129)
ALT SERPL-CCNC: 12 U/L (ref 10–40)
ALT SERPL-CCNC: 8 U/L (ref 10–40)
ANION GAP SERPL CALCULATED.3IONS-SCNC: 11 MMOL/L (ref 4–16)
ANION GAP SERPL CALCULATED.3IONS-SCNC: 11 MMOL/L (ref 4–16)
ANION GAP SERPL CALCULATED.3IONS-SCNC: 12 MMOL/L (ref 4–16)
ANION GAP SERPL CALCULATED.3IONS-SCNC: 14 MMOL/L (ref 4–16)
ANION GAP SERPL CALCULATED.3IONS-SCNC: 15 MMOL/L (ref 4–16)
ANION GAP SERPL CALCULATED.3IONS-SCNC: 16 MMOL/L (ref 4–16)
ANION GAP SERPL CALCULATED.3IONS-SCNC: 16 MMOL/L (ref 4–16)
ANION GAP SERPL CALCULATED.3IONS-SCNC: 18 MMOL/L (ref 4–16)
ANION GAP SERPL CALCULATED.3IONS-SCNC: 21 MMOL/L (ref 4–16)
ANTIBODY SCREEN: NEGATIVE
APTT: 102.3 SECONDS (ref 25.1–37.1)
APTT: 44.5 SECONDS (ref 25.1–37.1)
APTT: 77.1 SECONDS (ref 25.1–37.1)
APTT: >240 SECONDS (ref 25.1–37.1)
AST SERPL-CCNC: 41 IU/L (ref 15–37)
AST SERPL-CCNC: 64 IU/L (ref 15–37)
BASE EXCESS MIXED: 4.5 (ref 0–2.3)
BASE EXCESS MIXED: 5.2 (ref 0–2.3)
BASE EXCESS MIXED: 5.6 (ref 0–2.3)
BASE EXCESS MIXED: 6.3 (ref 0–2.3)
BASE EXCESS MIXED: 8.7 (ref 0–2.3)
BASE EXCESS: 10 (ref 0–2.4)
BASE EXCESS: 16 (ref 0–2.4)
BASE EXCESS: 4 (ref 0–2.4)
BASE EXCESS: 5 (ref 0–2.4)
BASE EXCESS: 6 (ref 0–2.4)
BASE EXCESS: 6 (ref 0–2.4)
BASE EXCESS: 7 (ref 0–2.4)
BASE EXCESS: 8 (ref 0–2.4)
BASE EXCESS: 9 (ref 0–2.4)
BASE EXCESS: ABNORMAL (ref 0–2.4)
BASOPHILS ABSOLUTE: 0 K/CU MM
BASOPHILS RELATIVE PERCENT: 0.4 % (ref 0–1)
BILIRUB SERPL-MCNC: 0.3 MG/DL (ref 0–1)
BILIRUB SERPL-MCNC: 0.5 MG/DL (ref 0–1)
BILIRUBIN DIRECT: 0.5 MG/DL (ref 0–0.3)
BILIRUBIN, INDIRECT: 0 MG/DL (ref 0–0.7)
BUN BLDV-MCNC: 10 MG/DL (ref 6–23)
BUN BLDV-MCNC: 21 MG/DL (ref 6–23)
BUN BLDV-MCNC: 4 MG/DL (ref 6–23)
BUN BLDV-MCNC: 5 MG/DL (ref 6–23)
BUN BLDV-MCNC: 5 MG/DL (ref 6–23)
BUN BLDV-MCNC: 6 MG/DL (ref 6–23)
BUN BLDV-MCNC: 7 MG/DL (ref 6–23)
BUN BLDV-MCNC: 8 MG/DL (ref 6–23)
BUN BLDV-MCNC: 9 MG/DL (ref 6–23)
CALCIUM IONIZED: 4.32 MG/DL (ref 4.48–5.28)
CALCIUM IONIZED: 4.32 MG/DL (ref 4.48–5.28)
CALCIUM IONIZED: 4.44 MG/DL (ref 4.48–5.28)
CALCIUM IONIZED: 4.48 MG/DL (ref 4.48–5.28)
CALCIUM IONIZED: 4.48 MG/DL (ref 4.48–5.28)
CALCIUM IONIZED: 4.56 MG/DL (ref 4.48–5.28)
CALCIUM SERPL-MCNC: 7.3 MG/DL (ref 8.3–10.6)
CALCIUM SERPL-MCNC: 7.4 MG/DL (ref 8.3–10.6)
CALCIUM SERPL-MCNC: 7.8 MG/DL (ref 8.3–10.6)
CALCIUM SERPL-MCNC: 7.8 MG/DL (ref 8.3–10.6)
CALCIUM SERPL-MCNC: 7.9 MG/DL (ref 8.3–10.6)
CALCIUM SERPL-MCNC: 8.1 MG/DL (ref 8.3–10.6)
CALCIUM SERPL-MCNC: 8.2 MG/DL (ref 8.3–10.6)
CALCIUM SERPL-MCNC: 8.5 MG/DL (ref 8.3–10.6)
CALCIUM SERPL-MCNC: 8.5 MG/DL (ref 8.3–10.6)
CARBON MONOXIDE, BLOOD: 1.4 % (ref 0–5)
CARBON MONOXIDE, BLOOD: 1.7 % (ref 0–5)
CARBON MONOXIDE, BLOOD: 1.9 % (ref 0–5)
CARBON MONOXIDE, BLOOD: 1.9 % (ref 0–5)
CARBON MONOXIDE, BLOOD: 2 % (ref 0–5)
CARBON MONOXIDE, BLOOD: 2 % (ref 0–5)
CARBON MONOXIDE, BLOOD: 2.1 % (ref 0–5)
CARBON MONOXIDE, BLOOD: 2.8 % (ref 0–5)
CHLORIDE BLD-SCNC: 102 MMOL/L (ref 99–110)
CHLORIDE BLD-SCNC: 105 MMOL/L (ref 99–110)
CHLORIDE BLD-SCNC: 113 MMOL/L (ref 99–110)
CHLORIDE BLD-SCNC: 114 MMOL/L (ref 99–110)
CHLORIDE BLD-SCNC: 115 MMOL/L (ref 99–110)
CHLORIDE BLD-SCNC: 115 MMOL/L (ref 99–110)
CHLORIDE BLD-SCNC: 116 MMOL/L (ref 99–110)
CHLORIDE BLD-SCNC: 98 MMOL/L (ref 99–110)
CHLORIDE BLD-SCNC: 99 MMOL/L (ref 99–110)
CO2 CONTENT: 13.4 MMOL/L (ref 19–24)
CO2 CONTENT: 17.1 MMOL/L (ref 19–24)
CO2 CONTENT: 17.3 MMOL/L (ref 19–24)
CO2 CONTENT: 19.1 MMOL/L (ref 19–24)
CO2 CONTENT: 19.1 MMOL/L (ref 19–24)
CO2 CONTENT: 19.5 MMOL/L (ref 19–24)
CO2 CONTENT: 19.7 MMOL/L (ref 19–24)
CO2 CONTENT: 20.1 MMOL/L (ref 19–24)
CO2: 11 MMOL/L (ref 21–32)
CO2: 14 MMOL/L (ref 21–32)
CO2: 16 MMOL/L (ref 21–32)
CO2: 17 MMOL/L (ref 21–32)
CO2: 17 MMOL/L (ref 21–32)
CO2: 18 MMOL/L (ref 21–32)
CO2: 19 MMOL/L (ref 21–32)
CO2: 19 MMOL/L (ref 21–32)
CO2: 20 MMOL/L (ref 21–32)
COMMENT: ABNORMAL
CREAT SERPL-MCNC: 0.5 MG/DL (ref 0.6–1.1)
CREAT SERPL-MCNC: 0.6 MG/DL (ref 0.6–1.1)
CREAT SERPL-MCNC: 0.6 MG/DL (ref 0.6–1.1)
CREAT SERPL-MCNC: 0.8 MG/DL (ref 0.6–1.1)
CREAT SERPL-MCNC: 0.8 MG/DL (ref 0.6–1.1)
CREAT SERPL-MCNC: 1.3 MG/DL (ref 0.6–1.1)
CREAT SERPL-MCNC: 2.7 MG/DL (ref 0.6–1.1)
D DIMER: 1862 NG/ML(DDU)
D DIMER: 475 NG/ML(DDU)
D DIMER: 476 NG/ML(DDU)
D DIMER: 970 NG/ML(DDU)
DIFFERENTIAL TYPE: ABNORMAL
EKG ATRIAL RATE: 45 BPM
EKG ATRIAL RATE: 78 BPM
EKG DIAGNOSIS: NORMAL
EKG DIAGNOSIS: NORMAL
EKG Q-T INTERVAL: 406 MS
EKG Q-T INTERVAL: 488 MS
EKG QRS DURATION: 82 MS
EKG QRS DURATION: 86 MS
EKG QTC CALCULATION (BAZETT): 522 MS
EKG QTC CALCULATION (BAZETT): 531 MS
EKG R AXIS: 119 DEGREES
EKG R AXIS: 83 DEGREES
EKG T AXIS: 147 DEGREES
EKG T AXIS: 185 DEGREES
EKG VENTRICULAR RATE: 103 BPM
EKG VENTRICULAR RATE: 69 BPM
EOSINOPHILS ABSOLUTE: 0 K/CU MM
EOSINOPHILS RELATIVE PERCENT: 0.1 % (ref 0–3)
FERRITIN: 202 NG/ML (ref 15–150)
FIBRINOGEN LEVEL: 238 MG/DL (ref 196.9–442.1)
FIBRINOGEN LEVEL: 274 MG/DL (ref 196.9–442.1)
GFR AFRICAN AMERICAN: 21 ML/MIN/1.73M2
GFR AFRICAN AMERICAN: 49 ML/MIN/1.73M2
GFR AFRICAN AMERICAN: >60 ML/MIN/1.73M2
GFR NON-AFRICAN AMERICAN: 17 ML/MIN/1.73M2
GFR NON-AFRICAN AMERICAN: 40 ML/MIN/1.73M2
GFR NON-AFRICAN AMERICAN: 59 ML/MIN/1.73M2
GFR NON-AFRICAN AMERICAN: >60 ML/MIN/1.73M2
GLUCOSE BLD-MCNC: 100 MG/DL (ref 70–99)
GLUCOSE BLD-MCNC: 102 MG/DL (ref 70–99)
GLUCOSE BLD-MCNC: 103 MG/DL (ref 70–99)
GLUCOSE BLD-MCNC: 103 MG/DL (ref 70–99)
GLUCOSE BLD-MCNC: 105 MG/DL (ref 70–99)
GLUCOSE BLD-MCNC: 105 MG/DL (ref 70–99)
GLUCOSE BLD-MCNC: 107 MG/DL (ref 70–99)
GLUCOSE BLD-MCNC: 108 MG/DL (ref 70–99)
GLUCOSE BLD-MCNC: 108 MG/DL (ref 70–99)
GLUCOSE BLD-MCNC: 109 MG/DL (ref 70–99)
GLUCOSE BLD-MCNC: 109 MG/DL (ref 70–99)
GLUCOSE BLD-MCNC: 110 MG/DL (ref 70–99)
GLUCOSE BLD-MCNC: 110 MG/DL (ref 70–99)
GLUCOSE BLD-MCNC: 111 MG/DL (ref 70–99)
GLUCOSE BLD-MCNC: 112 MG/DL (ref 70–99)
GLUCOSE BLD-MCNC: 113 MG/DL (ref 70–99)
GLUCOSE BLD-MCNC: 116 MG/DL (ref 70–99)
GLUCOSE BLD-MCNC: 117 MG/DL (ref 70–99)
GLUCOSE BLD-MCNC: 118 MG/DL (ref 70–99)
GLUCOSE BLD-MCNC: 119 MG/DL (ref 70–99)
GLUCOSE BLD-MCNC: 120 MG/DL (ref 70–99)
GLUCOSE BLD-MCNC: 123 MG/DL (ref 70–99)
GLUCOSE BLD-MCNC: 125 MG/DL (ref 70–99)
GLUCOSE BLD-MCNC: 134 MG/DL (ref 70–99)
GLUCOSE BLD-MCNC: 138 MG/DL (ref 70–99)
GLUCOSE BLD-MCNC: 149 MG/DL (ref 70–99)
GLUCOSE BLD-MCNC: 152 MG/DL (ref 70–99)
GLUCOSE BLD-MCNC: 166 MG/DL (ref 70–99)
GLUCOSE BLD-MCNC: 188 MG/DL (ref 70–99)
GLUCOSE BLD-MCNC: 213 MG/DL (ref 70–99)
GLUCOSE BLD-MCNC: 224 MG/DL (ref 70–99)
GLUCOSE BLD-MCNC: 237 MG/DL (ref 70–99)
GLUCOSE BLD-MCNC: 248 MG/DL (ref 70–99)
GLUCOSE BLD-MCNC: 253 MG/DL (ref 70–99)
GLUCOSE BLD-MCNC: 255 MG/DL (ref 70–99)
GLUCOSE BLD-MCNC: 265 MG/DL (ref 70–99)
GLUCOSE BLD-MCNC: 316 MG/DL (ref 70–99)
GLUCOSE BLD-MCNC: 95 MG/DL (ref 70–99)
GLUCOSE BLD-MCNC: 95 MG/DL (ref 70–99)
GLUCOSE BLD-MCNC: 99 MG/DL (ref 70–99)
HCO3 ARTERIAL: 12.3 MMOL/L (ref 18–23)
HCO3 ARTERIAL: 16.1 MMOL/L (ref 18–23)
HCO3 ARTERIAL: 16.2 MMOL/L (ref 18–23)
HCO3 ARTERIAL: 16.9 MMOL/L (ref 18–23)
HCO3 ARTERIAL: 18.1 MMOL/L (ref 18–23)
HCO3 ARTERIAL: 18.2 MMOL/L (ref 18–23)
HCO3 ARTERIAL: 18.3 MMOL/L (ref 18–23)
HCO3 ARTERIAL: 18.4 MMOL/L (ref 18–23)
HCO3 ARTERIAL: 18.6 MMOL/L (ref 18–23)
HCO3 ARTERIAL: 18.7 MMOL/L (ref 18–23)
HCO3 ARTERIAL: 19.2 MMOL/L (ref 18–23)
HCO3 VENOUS: 16 MMOL/L (ref 19–25)
HCT VFR BLD CALC: 28 % (ref 37–47)
HCT VFR BLD CALC: 28.4 % (ref 37–47)
HCT VFR BLD CALC: 29 % (ref 37–47)
HCT VFR BLD CALC: 29 % (ref 37–47)
HCT VFR BLD CALC: 30 % (ref 37–47)
HCT VFR BLD CALC: 30 % (ref 37–47)
HCT VFR BLD CALC: 31 % (ref 37–47)
HCT VFR BLD CALC: 31.5 % (ref 37–47)
HCT VFR BLD CALC: 33 % (ref 37–47)
HCT VFR BLD CALC: 33.1 % (ref 37–47)
HCT VFR BLD CALC: 34.1 % (ref 37–47)
HEMOGLOBIN: 10.2 GM/DL (ref 12.5–16)
HEMOGLOBIN: 10.3 GM/DL (ref 12.5–16)
HEMOGLOBIN: 10.4 GM/DL (ref 12.5–16)
HEMOGLOBIN: 10.5 GM/DL (ref 12.5–16)
HEMOGLOBIN: 10.6 GM/DL (ref 12.5–16)
HEMOGLOBIN: 11.2 GM/DL (ref 12.5–16)
HEMOGLOBIN: 8.7 GM/DL (ref 12.5–16)
HEMOGLOBIN: 9.2 GM/DL (ref 12.5–16)
HEMOGLOBIN: 9.7 GM/DL (ref 12.5–16)
HEMOGLOBIN: 9.7 GM/DL (ref 12.5–16)
HEMOGLOBIN: 9.9 GM/DL (ref 12.5–16)
HIGH SENSITIVE C-REACTIVE PROTEIN: 138.8 MG/L
HIGH SENSITIVE C-REACTIVE PROTEIN: 38.7 MG/L
HIGH SENSITIVE C-REACTIVE PROTEIN: 8.1 MG/L
IMMATURE NEUTROPHIL %: 4.8 % (ref 0–0.43)
INR BLD: 4.12 INDEX
INR BLD: 4.67 INDEX
INR BLD: 7.26 INDEX
INR BLD: 7.38 INDEX
INR BLD: 8.65 INDEX
INR BLD: 8.95 INDEX
IONIZED CA: 1.08 MMOL/L (ref 1.12–1.32)
IONIZED CA: 1.08 MMOL/L (ref 1.12–1.32)
IONIZED CA: 1.11 MMOL/L (ref 1.12–1.32)
IONIZED CA: 1.12 MMOL/L (ref 1.12–1.32)
IONIZED CA: 1.12 MMOL/L (ref 1.12–1.32)
IONIZED CA: 1.14 MMOL/L (ref 1.12–1.32)
LACTATE DEHYDROGENASE: 395 IU/L (ref 120–246)
LACTATE: 1.2 MMOL/L (ref 0.4–2)
LACTATE: 1.2 MMOL/L (ref 0.4–2)
LACTATE: 2.3 MMOL/L (ref 0.4–2)
LACTATE: 5 MMOL/L (ref 0.4–2)
LV EF: 50 %
LVEF MODALITY: NORMAL
LYMPHOCYTES ABSOLUTE: 3.9 K/CU MM
LYMPHOCYTES RELATIVE PERCENT: 50.6 % (ref 24–44)
MAGNESIUM: 1.4 MG/DL (ref 1.8–2.4)
MAGNESIUM: 1.6 MG/DL (ref 1.8–2.4)
MAGNESIUM: 1.7 MG/DL (ref 1.8–2.4)
MAGNESIUM: 1.8 MG/DL (ref 1.8–2.4)
MAGNESIUM: 1.9 MG/DL (ref 1.8–2.4)
MAGNESIUM: 2 MG/DL (ref 1.8–2.4)
MAGNESIUM: 2.1 MG/DL (ref 1.8–2.4)
MCH RBC QN AUTO: 26.2 PG (ref 27–31)
MCH RBC QN AUTO: 26.4 PG (ref 27–31)
MCH RBC QN AUTO: 26.7 PG (ref 27–31)
MCH RBC QN AUTO: 27 PG (ref 27–31)
MCHC RBC AUTO-ENTMCNC: 29.2 % (ref 32–36)
MCHC RBC AUTO-ENTMCNC: 29.9 % (ref 32–36)
MCHC RBC AUTO-ENTMCNC: 30.6 % (ref 32–36)
MCHC RBC AUTO-ENTMCNC: 32 % (ref 32–36)
MCV RBC AUTO: 84.4 FL (ref 78–100)
MCV RBC AUTO: 87.1 FL (ref 78–100)
MCV RBC AUTO: 87.4 FL (ref 78–100)
MCV RBC AUTO: 90.3 FL (ref 78–100)
METHEMOGLOBIN ARTERIAL: 1 %
METHEMOGLOBIN ARTERIAL: 1.1 %
METHEMOGLOBIN ARTERIAL: 1.3 %
METHEMOGLOBIN ARTERIAL: 1.4 %
MONOCYTES ABSOLUTE: 0.4 K/CU MM
MONOCYTES RELATIVE PERCENT: 4.7 % (ref 0–4)
NUCLEATED RBC %: 0.3 %
O2 SAT, VEN: 96.4 % (ref 50–70)
O2 SATURATION: 82.7 % (ref 96–97)
O2 SATURATION: 91.7 % (ref 96–97)
O2 SATURATION: 93.8 % (ref 96–97)
O2 SATURATION: 95.3 % (ref 96–97)
O2 SATURATION: 95.3 % (ref 96–97)
O2 SATURATION: 95.7 % (ref 96–97)
O2 SATURATION: 96 % (ref 96–97)
O2 SATURATION: 96 % (ref 96–97)
O2 SATURATION: 96.1 % (ref 96–97)
O2 SATURATION: 96.1 % (ref 96–97)
O2 SATURATION: 96.5 % (ref 96–97)
O2 SATURATION: 96.6 % (ref 96–97)
O2 SATURATION: 96.7 % (ref 96–97)
O2 SATURATION: 97 % (ref 96–97)
O2 SATURATION: 97.9 % (ref 96–97)
PCO2 ARTERIAL: 27.9 MMHG (ref 32–45)
PCO2 ARTERIAL: 28.5 MMHG (ref 32–45)
PCO2 ARTERIAL: 29 MMHG (ref 32–45)
PCO2 ARTERIAL: 29.8 MMHG (ref 32–45)
PCO2 ARTERIAL: 30 MMHG (ref 32–45)
PCO2 ARTERIAL: 30 MMHG (ref 32–45)
PCO2 ARTERIAL: 30.7 MMHG (ref 32–45)
PCO2 ARTERIAL: 30.9 MMHG (ref 32–45)
PCO2 ARTERIAL: 32 MMHG (ref 32–45)
PCO2 ARTERIAL: 32 MMHG (ref 32–45)
PCO2 ARTERIAL: 33.8 MMHG (ref 32–45)
PCO2 ARTERIAL: 34.4 MMHG (ref 32–45)
PCO2 ARTERIAL: 36 MMHG (ref 32–45)
PCO2 ARTERIAL: 37 MMHG (ref 32–45)
PCO2 ARTERIAL: 37 MMHG (ref 32–45)
PCO2, VEN: 29 MMHG (ref 38–52)
PDW BLD-RTO: 14.8 % (ref 11.7–14.9)
PDW BLD-RTO: 15 % (ref 11.7–14.9)
PDW BLD-RTO: 15.9 % (ref 11.7–14.9)
PDW BLD-RTO: 16.3 % (ref 11.7–14.9)
PH BLOOD: 7.13 (ref 7.34–7.45)
PH BLOOD: 7.26 (ref 7.34–7.45)
PH BLOOD: 7.3 (ref 7.34–7.45)
PH BLOOD: 7.31 (ref 7.34–7.45)
PH BLOOD: 7.31 (ref 7.34–7.45)
PH BLOOD: 7.35 (ref 7.34–7.45)
PH BLOOD: 7.36 (ref 7.34–7.45)
PH BLOOD: 7.39 (ref 7.34–7.45)
PH BLOOD: 7.4 (ref 7.34–7.45)
PH BLOOD: 7.4 (ref 7.34–7.45)
PH BLOOD: 7.42 (ref 7.34–7.45)
PH BLOOD: 7.43 (ref 7.34–7.45)
PH BLOOD: 7.43 (ref 7.34–7.45)
PH VENOUS: 7.35 (ref 7.32–7.42)
PHOSPHORUS: 1.6 MG/DL (ref 2.5–4.9)
PHOSPHORUS: 1.7 MG/DL (ref 2.5–4.9)
PHOSPHORUS: 1.7 MG/DL (ref 2.5–4.9)
PHOSPHORUS: 2.1 MG/DL (ref 2.5–4.9)
PHOSPHORUS: 2.2 MG/DL (ref 2.5–4.9)
PHOSPHORUS: 2.8 MG/DL (ref 2.5–4.9)
PHOSPHORUS: 5.2 MG/DL (ref 2.5–4.9)
PLATELET # BLD: 132 K/CU MM (ref 140–440)
PLATELET # BLD: 155 K/CU MM (ref 140–440)
PLATELET # BLD: 172 K/CU MM (ref 140–440)
PLATELET # BLD: 182 K/CU MM (ref 140–440)
PMV BLD AUTO: 10.2 FL (ref 7.5–11.1)
PMV BLD AUTO: 10.5 FL (ref 7.5–11.1)
PMV BLD AUTO: 9.6 FL (ref 7.5–11.1)
PMV BLD AUTO: 9.9 FL (ref 7.5–11.1)
PO2 ARTERIAL: 100 MMHG (ref 75–100)
PO2 ARTERIAL: 108 MMHG (ref 75–100)
PO2 ARTERIAL: 119 MMHG (ref 75–100)
PO2 ARTERIAL: 128 MMHG (ref 75–100)
PO2 ARTERIAL: 253 MMHG (ref 75–100)
PO2 ARTERIAL: 297 MMHG (ref 75–100)
PO2 ARTERIAL: 50 MMHG (ref 75–100)
PO2 ARTERIAL: 65.3 MMHG (ref 75–100)
PO2 ARTERIAL: 69.6 MMHG (ref 75–100)
PO2 ARTERIAL: 76 MMHG (ref 75–100)
PO2 ARTERIAL: 76.6 MMHG (ref 75–100)
PO2 ARTERIAL: 84.3 MMHG (ref 75–100)
PO2 ARTERIAL: 85.2 MMHG (ref 75–100)
PO2 ARTERIAL: 87.2 MMHG (ref 75–100)
PO2 ARTERIAL: 94 MMHG (ref 75–100)
PO2, VEN: 116 MMHG (ref 28–48)
POC CALCIUM: 1.16 MMOL/L (ref 1.12–1.32)
POC CALCIUM: 1.17 MMOL/L (ref 1.12–1.32)
POC CALCIUM: 1.17 MMOL/L (ref 1.12–1.32)
POC CALCIUM: 1.18 MMOL/L (ref 1.12–1.32)
POC CALCIUM: 1.19 MMOL/L (ref 1.12–1.32)
POC CALCIUM: 1.19 MMOL/L (ref 1.12–1.32)
POC CALCIUM: 1.2 MMOL/L (ref 1.12–1.32)
POC CALCIUM: 1.22 MMOL/L (ref 1.12–1.32)
POC CALCIUM: 1.22 MMOL/L (ref 1.12–1.32)
POC CHLORIDE: 114 MMOL/L (ref 98–109)
POC CHLORIDE: 115 MMOL/L (ref 98–109)
POC CHLORIDE: 116 MMOL/L (ref 98–109)
POC CHLORIDE: 116 MMOL/L (ref 98–109)
POC CHLORIDE: 117 MMOL/L (ref 98–109)
POC CHLORIDE: 118 MMOL/L (ref 98–109)
POC CHLORIDE: 118 MMOL/L (ref 98–109)
POC CHLORIDE: 119 MMOL/L (ref 98–109)
POC CHLORIDE: 119 MMOL/L (ref 98–109)
POC CREATININE: 0.6 MG/DL (ref 0.6–1.1)
POC CREATININE: 0.7 MG/DL (ref 0.6–1.1)
POC CREATININE: 0.8 MG/DL (ref 0.6–1.1)
POC CREATININE: 0.9 MG/DL (ref 0.6–1.1)
POC CREATININE: 0.9 MG/DL (ref 0.6–1.1)
POTASSIUM SERPL-SCNC: 2.3 MMOL/L (ref 3.5–4.5)
POTASSIUM SERPL-SCNC: 2.3 MMOL/L (ref 3.5–4.5)
POTASSIUM SERPL-SCNC: 2.5 MMOL/L (ref 3.5–4.5)
POTASSIUM SERPL-SCNC: 2.6 MMOL/L (ref 3.5–4.5)
POTASSIUM SERPL-SCNC: 2.7 MMOL/L (ref 3.5–4.5)
POTASSIUM SERPL-SCNC: 2.7 MMOL/L (ref 3.5–5.1)
POTASSIUM SERPL-SCNC: 2.8 MMOL/L (ref 3.5–4.5)
POTASSIUM SERPL-SCNC: 2.9 MMOL/L (ref 3.5–5.1)
POTASSIUM SERPL-SCNC: 2.9 MMOL/L (ref 3.5–5.1)
POTASSIUM SERPL-SCNC: 3 MMOL/L (ref 3.5–4.5)
POTASSIUM SERPL-SCNC: 3 MMOL/L (ref 3.5–4.5)
POTASSIUM SERPL-SCNC: 3 MMOL/L (ref 3.5–5.1)
POTASSIUM SERPL-SCNC: 3.1 MMOL/L (ref 3.5–4.5)
POTASSIUM SERPL-SCNC: 3.2 MMOL/L (ref 3.5–4.5)
POTASSIUM SERPL-SCNC: 3.2 MMOL/L (ref 3.5–4.5)
POTASSIUM SERPL-SCNC: 3.2 MMOL/L (ref 3.5–5.1)
POTASSIUM SERPL-SCNC: 3.2 MMOL/L (ref 3.5–5.1)
POTASSIUM SERPL-SCNC: 3.4 MMOL/L (ref 3.5–4.5)
POTASSIUM SERPL-SCNC: 3.4 MMOL/L (ref 3.5–5.1)
POTASSIUM SERPL-SCNC: 3.5 MMOL/L (ref 3.5–5.1)
POTASSIUM SERPL-SCNC: 3.7 MMOL/L (ref 3.5–4.5)
POTASSIUM SERPL-SCNC: 3.9 MMOL/L (ref 3.5–4.5)
POTASSIUM SERPL-SCNC: 4.1 MMOL/L (ref 3.5–5.1)
POTASSIUM SERPL-SCNC: 4.2 MMOL/L (ref 3.5–5.1)
PRO-BNP: 1490 PG/ML
PROCALCITONIN: 0.19
PROCALCITONIN: 0.38
PROTHROMBIN TIME: 114.1 SECONDS (ref 11.7–14.5)
PROTHROMBIN TIME: 118.1 SECONDS (ref 11.7–14.5)
PROTHROMBIN TIME: 53.9 SECONDS (ref 11.7–14.5)
PROTHROMBIN TIME: 61.2 SECONDS (ref 11.7–14.5)
PROTHROMBIN TIME: 95.6 SECONDS (ref 11.7–14.5)
PROTHROMBIN TIME: 97.2 SECONDS (ref 11.7–14.5)
RBC # BLD: 3.26 M/CU MM (ref 4.2–5.4)
RBC # BLD: 3.49 M/CU MM (ref 4.2–5.4)
RBC # BLD: 3.9 M/CU MM (ref 4.2–5.4)
RBC # BLD: 3.92 M/CU MM (ref 4.2–5.4)
SARS-COV-2, NAAT: DETECTED
SEGMENTED NEUTROPHILS ABSOLUTE COUNT: 3 K/CU MM
SEGMENTED NEUTROPHILS RELATIVE PERCENT: 39.4 % (ref 36–66)
SODIUM BLD-SCNC: 131 MMOL/L (ref 135–145)
SODIUM BLD-SCNC: 133 MMOL/L (ref 135–145)
SODIUM BLD-SCNC: 134 MMOL/L (ref 135–145)
SODIUM BLD-SCNC: 136 MMOL/L (ref 135–145)
SODIUM BLD-SCNC: 142 MMOL/L (ref 135–145)
SODIUM BLD-SCNC: 143 MMOL/L (ref 135–145)
SODIUM BLD-SCNC: 145 MMOL/L (ref 135–145)
SODIUM BLD-SCNC: 146 MMOL/L (ref 138–146)
SODIUM BLD-SCNC: 147 MMOL/L (ref 138–146)
SODIUM BLD-SCNC: 148 MMOL/L (ref 138–146)
SODIUM BLD-SCNC: 149 MMOL/L (ref 138–146)
SOURCE, BLOOD GAS: ABNORMAL
SOURCE: ABNORMAL
TOTAL IMMATURE NEUTOROPHIL: 0.37 K/CU MM
TOTAL NUCLEATED RBC: 0 K/CU MM
TOTAL PROTEIN: 4.8 GM/DL (ref 6.4–8.2)
TOTAL PROTEIN: 6.3 GM/DL (ref 6.4–8.2)
TROPONIN T: 0.11 NG/ML
TROPONIN T: 0.19 NG/ML
TROPONIN T: <0.01 NG/ML
WBC # BLD: 7.7 K/CU MM (ref 4–10.5)
WBC # BLD: 9.2 K/CU MM (ref 4–10.5)
WBC # BLD: 9.2 K/CU MM (ref 4–10.5)
WBC # BLD: 9.8 K/CU MM (ref 4–10.5)

## 2022-01-01 PROCEDURE — 6360000004 HC RX CONTRAST MEDICATION: Performed by: EMERGENCY MEDICINE

## 2022-01-01 PROCEDURE — 82962 GLUCOSE BLOOD TEST: CPT

## 2022-01-01 PROCEDURE — 83735 ASSAY OF MAGNESIUM: CPT

## 2022-01-01 PROCEDURE — 36620 INSERTION CATHETER ARTERY: CPT | Performed by: NURSE ANESTHETIST, CERTIFIED REGISTERED

## 2022-01-01 PROCEDURE — 85730 THROMBOPLASTIN TIME PARTIAL: CPT

## 2022-01-01 PROCEDURE — 94761 N-INVAS EAR/PLS OXIMETRY MLT: CPT

## 2022-01-01 PROCEDURE — 83880 ASSAY OF NATRIURETIC PEPTIDE: CPT

## 2022-01-01 PROCEDURE — 82803 BLOOD GASES ANY COMBINATION: CPT

## 2022-01-01 PROCEDURE — 89220 SPUTUM SPECIMEN COLLECTION: CPT

## 2022-01-01 PROCEDURE — 85610 PROTHROMBIN TIME: CPT

## 2022-01-01 PROCEDURE — 83605 ASSAY OF LACTIC ACID: CPT

## 2022-01-01 PROCEDURE — 6360000002 HC RX W HCPCS: Performed by: INTERNAL MEDICINE

## 2022-01-01 PROCEDURE — 85027 COMPLETE CBC AUTOMATED: CPT

## 2022-01-01 PROCEDURE — 84145 PROCALCITONIN (PCT): CPT

## 2022-01-01 PROCEDURE — 2000000000 HC ICU R&B

## 2022-01-01 PROCEDURE — 2700000000 HC OXYGEN THERAPY PER DAY

## 2022-01-01 PROCEDURE — 80048 BASIC METABOLIC PNL TOTAL CA: CPT

## 2022-01-01 PROCEDURE — 94003 VENT MGMT INPAT SUBQ DAY: CPT

## 2022-01-01 PROCEDURE — 6370000000 HC RX 637 (ALT 250 FOR IP): Performed by: INTERNAL MEDICINE

## 2022-01-01 PROCEDURE — XW033H5 INTRODUCTION OF TOCILIZUMAB INTO PERIPHERAL VEIN, PERCUTANEOUS APPROACH, NEW TECHNOLOGY GROUP 5: ICD-10-PCS | Performed by: INTERNAL MEDICINE

## 2022-01-01 PROCEDURE — 6360000002 HC RX W HCPCS: Performed by: STUDENT IN AN ORGANIZED HEALTH CARE EDUCATION/TRAINING PROGRAM

## 2022-01-01 PROCEDURE — 84132 ASSAY OF SERUM POTASSIUM: CPT

## 2022-01-01 PROCEDURE — 80053 COMPREHEN METABOLIC PANEL: CPT

## 2022-01-01 PROCEDURE — 6360000002 HC RX W HCPCS: Performed by: NURSE PRACTITIONER

## 2022-01-01 PROCEDURE — 84100 ASSAY OF PHOSPHORUS: CPT

## 2022-01-01 PROCEDURE — 36592 COLLECT BLOOD FROM PICC: CPT

## 2022-01-01 PROCEDURE — 70450 CT HEAD/BRAIN W/O DYE: CPT

## 2022-01-01 PROCEDURE — 86901 BLOOD TYPING SEROLOGIC RH(D): CPT

## 2022-01-01 PROCEDURE — 2500000003 HC RX 250 WO HCPCS: Performed by: STUDENT IN AN ORGANIZED HEALTH CARE EDUCATION/TRAINING PROGRAM

## 2022-01-01 PROCEDURE — 71045 X-RAY EXAM CHEST 1 VIEW: CPT

## 2022-01-01 PROCEDURE — 37799 UNLISTED PX VASCULAR SURGERY: CPT

## 2022-01-01 PROCEDURE — 2580000003 HC RX 258: Performed by: INTERNAL MEDICINE

## 2022-01-01 PROCEDURE — 74018 RADEX ABDOMEN 1 VIEW: CPT

## 2022-01-01 PROCEDURE — 31500 INSERT EMERGENCY AIRWAY: CPT

## 2022-01-01 PROCEDURE — 2500000003 HC RX 250 WO HCPCS: Performed by: INTERNAL MEDICINE

## 2022-01-01 PROCEDURE — 83615 LACTATE (LD) (LDH) ENZYME: CPT

## 2022-01-01 PROCEDURE — 2580000003 HC RX 258: Performed by: STUDENT IN AN ORGANIZED HEALTH CARE EDUCATION/TRAINING PROGRAM

## 2022-01-01 PROCEDURE — 6370000000 HC RX 637 (ALT 250 FOR IP): Performed by: STUDENT IN AN ORGANIZED HEALTH CARE EDUCATION/TRAINING PROGRAM

## 2022-01-01 PROCEDURE — 87081 CULTURE SCREEN ONLY: CPT

## 2022-01-01 PROCEDURE — 86141 C-REACTIVE PROTEIN HS: CPT

## 2022-01-01 PROCEDURE — 2500000003 HC RX 250 WO HCPCS: Performed by: NURSE PRACTITIONER

## 2022-01-01 PROCEDURE — 82728 ASSAY OF FERRITIN: CPT

## 2022-01-01 PROCEDURE — 86850 RBC ANTIBODY SCREEN: CPT

## 2022-01-01 PROCEDURE — 36556 INSERT NON-TUNNEL CV CATH: CPT

## 2022-01-01 PROCEDURE — 6360000002 HC RX W HCPCS

## 2022-01-01 PROCEDURE — 85025 COMPLETE CBC W/AUTO DIFF WBC: CPT

## 2022-01-01 PROCEDURE — 86900 BLOOD TYPING SEROLOGIC ABO: CPT

## 2022-01-01 PROCEDURE — 80051 ELECTROLYTE PANEL: CPT

## 2022-01-01 PROCEDURE — 96374 THER/PROPH/DIAG INJ IV PUSH: CPT

## 2022-01-01 PROCEDURE — 94002 VENT MGMT INPAT INIT DAY: CPT

## 2022-01-01 PROCEDURE — 82565 ASSAY OF CREATININE: CPT

## 2022-01-01 PROCEDURE — 85384 FIBRINOGEN ACTIVITY: CPT

## 2022-01-01 PROCEDURE — 2500000003 HC RX 250 WO HCPCS

## 2022-01-01 PROCEDURE — 99291 CRITICAL CARE FIRST HOUR: CPT | Performed by: INTERNAL MEDICINE

## 2022-01-01 PROCEDURE — 36620 INSERTION CATHETER ARTERY: CPT

## 2022-01-01 PROCEDURE — 2580000003 HC RX 258: Performed by: NURSE PRACTITIONER

## 2022-01-01 PROCEDURE — 82805 BLOOD GASES W/O2 SATURATION: CPT

## 2022-01-01 PROCEDURE — 36415 COLL VENOUS BLD VENIPUNCTURE: CPT

## 2022-01-01 PROCEDURE — 99223 1ST HOSP IP/OBS HIGH 75: CPT

## 2022-01-01 PROCEDURE — 93005 ELECTROCARDIOGRAM TRACING: CPT | Performed by: INTERNAL MEDICINE

## 2022-01-01 PROCEDURE — 82330 ASSAY OF CALCIUM: CPT

## 2022-01-01 PROCEDURE — 99284 EMERGENCY DEPT VISIT MOD MDM: CPT

## 2022-01-01 PROCEDURE — 80076 HEPATIC FUNCTION PANEL: CPT

## 2022-01-01 PROCEDURE — 85018 HEMOGLOBIN: CPT

## 2022-01-01 PROCEDURE — 0BH17EZ INSERTION OF ENDOTRACHEAL AIRWAY INTO TRACHEA, VIA NATURAL OR ARTIFICIAL OPENING: ICD-10-PCS | Performed by: EMERGENCY MEDICINE

## 2022-01-01 PROCEDURE — 99233 SBSQ HOSP IP/OBS HIGH 50: CPT | Performed by: INTERNAL MEDICINE

## 2022-01-01 PROCEDURE — 93306 TTE W/DOPPLER COMPLETE: CPT

## 2022-01-01 PROCEDURE — 85014 HEMATOCRIT: CPT

## 2022-01-01 PROCEDURE — 85379 FIBRIN DEGRADATION QUANT: CPT

## 2022-01-01 PROCEDURE — 84484 ASSAY OF TROPONIN QUANT: CPT

## 2022-01-01 PROCEDURE — 99232 SBSQ HOSP IP/OBS MODERATE 35: CPT

## 2022-01-01 PROCEDURE — 51702 INSERT TEMP BLADDER CATH: CPT

## 2022-01-01 PROCEDURE — 96365 THER/PROPH/DIAG IV INF INIT: CPT

## 2022-01-01 PROCEDURE — 6360000002 HC RX W HCPCS: Performed by: EMERGENCY MEDICINE

## 2022-01-01 PROCEDURE — 36680 INSERT NEEDLE BONE CAVITY: CPT

## 2022-01-01 PROCEDURE — P9017 PLASMA 1 DONOR FRZ W/IN 8 HR: HCPCS

## 2022-01-01 PROCEDURE — 71275 CT ANGIOGRAPHY CHEST: CPT

## 2022-01-01 PROCEDURE — 93010 ELECTROCARDIOGRAM REPORT: CPT | Performed by: INTERNAL MEDICINE

## 2022-01-01 PROCEDURE — 87635 SARS-COV-2 COVID-19 AMP PRB: CPT

## 2022-01-01 PROCEDURE — 02HV33Z INSERTION OF INFUSION DEVICE INTO SUPERIOR VENA CAVA, PERCUTANEOUS APPROACH: ICD-10-PCS | Performed by: INTERNAL MEDICINE

## 2022-01-01 PROCEDURE — 5A1945Z RESPIRATORY VENTILATION, 24-96 CONSECUTIVE HOURS: ICD-10-PCS | Performed by: STUDENT IN AN ORGANIZED HEALTH CARE EDUCATION/TRAINING PROGRAM

## 2022-01-01 RX ORDER — MAGNESIUM SULFATE 1 G/100ML
1000 INJECTION INTRAVENOUS PRN
Status: DISCONTINUED | OUTPATIENT
Start: 2022-01-01 | End: 2022-01-01 | Stop reason: HOSPADM

## 2022-01-01 RX ORDER — HEPARIN SODIUM 1000 [USP'U]/ML
4000 INJECTION, SOLUTION INTRAVENOUS; SUBCUTANEOUS PRN
Status: DISCONTINUED | OUTPATIENT
Start: 2022-01-01 | End: 2022-01-01

## 2022-01-01 RX ORDER — HEPARIN SODIUM 1000 [USP'U]/ML
2000 INJECTION, SOLUTION INTRAVENOUS; SUBCUTANEOUS PRN
Status: DISCONTINUED | OUTPATIENT
Start: 2022-01-01 | End: 2022-01-01

## 2022-01-01 RX ORDER — MIDAZOLAM HYDROCHLORIDE 2 MG/2ML
1 INJECTION, SOLUTION INTRAMUSCULAR; INTRAVENOUS EVERY 30 MIN PRN
Status: DISCONTINUED | OUTPATIENT
Start: 2022-01-01 | End: 2022-01-01 | Stop reason: HOSPADM

## 2022-01-01 RX ORDER — PROCHLORPERAZINE EDISYLATE 5 MG/ML
10 INJECTION INTRAMUSCULAR; INTRAVENOUS EVERY 6 HOURS PRN
Status: DISCONTINUED | OUTPATIENT
Start: 2022-01-01 | End: 2022-01-01 | Stop reason: HOSPADM

## 2022-01-01 RX ORDER — SODIUM CHLORIDE 9 MG/ML
INJECTION, SOLUTION INTRAVENOUS PRN
Status: DISCONTINUED | OUTPATIENT
Start: 2022-01-01 | End: 2022-01-01 | Stop reason: HOSPADM

## 2022-01-01 RX ORDER — PROPOFOL 10 MG/ML
5-50 INJECTION, EMULSION INTRAVENOUS CONTINUOUS
Status: DISCONTINUED | OUTPATIENT
Start: 2022-01-01 | End: 2022-01-01

## 2022-01-01 RX ORDER — LEVOFLOXACIN 5 MG/ML
500 INJECTION, SOLUTION INTRAVENOUS ONCE
Status: DISCONTINUED | OUTPATIENT
Start: 2022-01-01 | End: 2022-01-01

## 2022-01-01 RX ORDER — HEPARIN SODIUM 1000 [USP'U]/ML
60 INJECTION, SOLUTION INTRAVENOUS; SUBCUTANEOUS ONCE
Status: DISCONTINUED | OUTPATIENT
Start: 2022-01-01 | End: 2022-01-01

## 2022-01-01 RX ORDER — NOREPINEPHRINE BIT/0.9 % NACL 16MG/250ML
2-100 INFUSION BOTTLE (ML) INTRAVENOUS CONTINUOUS
Status: DISCONTINUED | OUTPATIENT
Start: 2022-01-01 | End: 2022-01-01

## 2022-01-01 RX ORDER — DOPAMINE HYDROCHLORIDE 160 MG/100ML
2-20 INJECTION, SOLUTION INTRAVENOUS CONTINUOUS
Status: DISCONTINUED | OUTPATIENT
Start: 2022-01-01 | End: 2022-01-01

## 2022-01-01 RX ORDER — POLYVINYL ALCOHOL 14 MG/ML
1 SOLUTION/ DROPS OPHTHALMIC
Status: DISCONTINUED | OUTPATIENT
Start: 2022-01-01 | End: 2022-01-01 | Stop reason: HOSPADM

## 2022-01-01 RX ORDER — ONDANSETRON 4 MG/1
4 TABLET, ORALLY DISINTEGRATING ORAL EVERY 8 HOURS PRN
Status: DISCONTINUED | OUTPATIENT
Start: 2022-01-01 | End: 2022-01-01 | Stop reason: HOSPADM

## 2022-01-01 RX ORDER — MORPHINE SULFATE 4 MG/ML
2 INJECTION, SOLUTION INTRAMUSCULAR; INTRAVENOUS
Status: DISCONTINUED | OUTPATIENT
Start: 2022-01-01 | End: 2022-01-01 | Stop reason: HOSPADM

## 2022-01-01 RX ORDER — SCOLOPAMINE TRANSDERMAL SYSTEM 1 MG/1
1 PATCH, EXTENDED RELEASE TRANSDERMAL
Status: DISCONTINUED | OUTPATIENT
Start: 2022-01-01 | End: 2022-01-01 | Stop reason: HOSPADM

## 2022-01-01 RX ORDER — POTASSIUM CHLORIDE 29.8 MG/ML
20 INJECTION INTRAVENOUS PRN
Status: DISCONTINUED | OUTPATIENT
Start: 2022-01-01 | End: 2022-01-01 | Stop reason: HOSPADM

## 2022-01-01 RX ORDER — SODIUM CHLORIDE 9 MG/ML
INJECTION, SOLUTION INTRAVENOUS CONTINUOUS
Status: DISCONTINUED | OUTPATIENT
Start: 2022-01-01 | End: 2022-01-01

## 2022-01-01 RX ORDER — MINERAL OIL AND WHITE PETROLATUM 150; 830 MG/G; MG/G
OINTMENT OPHTHALMIC
Status: DISCONTINUED | OUTPATIENT
Start: 2022-01-01 | End: 2022-01-01 | Stop reason: HOSPADM

## 2022-01-01 RX ORDER — CALCIUM GLUCONATE 20 MG/ML
2000 INJECTION, SOLUTION INTRAVENOUS ONCE
Status: COMPLETED | OUTPATIENT
Start: 2022-01-01 | End: 2022-01-01

## 2022-01-01 RX ORDER — ONDANSETRON 2 MG/ML
4 INJECTION INTRAMUSCULAR; INTRAVENOUS EVERY 6 HOURS PRN
Status: DISCONTINUED | OUTPATIENT
Start: 2022-01-01 | End: 2022-01-01 | Stop reason: HOSPADM

## 2022-01-01 RX ORDER — PROPOFOL 10 MG/ML
INJECTION, EMULSION INTRAVENOUS
Status: COMPLETED
Start: 2022-01-01 | End: 2022-01-01

## 2022-01-01 RX ORDER — DEXAMETHASONE SODIUM PHOSPHATE 10 MG/ML
10 INJECTION, SOLUTION INTRAMUSCULAR; INTRAVENOUS DAILY
Status: DISCONTINUED | OUTPATIENT
Start: 2022-01-01 | End: 2022-01-01

## 2022-01-01 RX ORDER — NOREPINEPHRINE BIT/0.9 % NACL 16MG/250ML
INFUSION BOTTLE (ML) INTRAVENOUS
Status: COMPLETED
Start: 2022-01-01 | End: 2022-01-01

## 2022-01-01 RX ORDER — LORAZEPAM 2 MG/ML
2 INJECTION INTRAMUSCULAR
Status: DISCONTINUED | OUTPATIENT
Start: 2022-01-01 | End: 2022-01-01 | Stop reason: HOSPADM

## 2022-01-01 RX ORDER — LEVOFLOXACIN 5 MG/ML
250 INJECTION, SOLUTION INTRAVENOUS EVERY 24 HOURS
Status: DISCONTINUED | OUTPATIENT
Start: 2022-01-13 | End: 2022-01-01

## 2022-01-01 RX ORDER — CHLORHEXIDINE GLUCONATE 0.12 MG/ML
15 RINSE ORAL 2 TIMES DAILY
Status: DISCONTINUED | OUTPATIENT
Start: 2022-01-01 | End: 2022-01-01

## 2022-01-01 RX ORDER — POTASSIUM CHLORIDE 7.45 MG/ML
20 INJECTION INTRAVENOUS ONCE
Status: COMPLETED | OUTPATIENT
Start: 2022-01-01 | End: 2022-01-01

## 2022-01-01 RX ORDER — MAGNESIUM SULFATE IN WATER 40 MG/ML
2000 INJECTION, SOLUTION INTRAVENOUS ONCE
Status: COMPLETED | OUTPATIENT
Start: 2022-01-01 | End: 2022-01-01

## 2022-01-01 RX ORDER — LEVOFLOXACIN 5 MG/ML
500 INJECTION, SOLUTION INTRAVENOUS EVERY 24 HOURS
Status: DISCONTINUED | OUTPATIENT
Start: 2022-01-01 | End: 2022-01-01 | Stop reason: DRUGHIGH

## 2022-01-01 RX ORDER — HEPARIN SODIUM 10000 [USP'U]/100ML
5-30 INJECTION, SOLUTION INTRAVENOUS CONTINUOUS
Status: DISCONTINUED | OUTPATIENT
Start: 2022-01-01 | End: 2022-01-01

## 2022-01-01 RX ORDER — PHYTONADIONE 10 MG/ML
10 INJECTION, EMULSION INTRAMUSCULAR; INTRAVENOUS; SUBCUTANEOUS ONCE
Status: COMPLETED | OUTPATIENT
Start: 2022-01-01 | End: 2022-01-01

## 2022-01-01 RX ADMIN — PROPOFOL 62.7 MCG/KG/MIN: 10 INJECTION, EMULSION INTRAVENOUS at 03:11

## 2022-01-01 RX ADMIN — CHLORHEXIDINE GLUCONATE 0.12% ORAL RINSE 15 ML: 1.2 LIQUID ORAL at 22:35

## 2022-01-01 RX ADMIN — SODIUM CHLORIDE 4 UNITS/HR: 9 INJECTION, SOLUTION INTRAVENOUS at 14:27

## 2022-01-01 RX ADMIN — PROPOFOL 80 MCG/KG/MIN: 10 INJECTION, EMULSION INTRAVENOUS at 04:27

## 2022-01-01 RX ADMIN — MIDAZOLAM 1 MG: 1 INJECTION INTRAMUSCULAR; INTRAVENOUS at 06:45

## 2022-01-01 RX ADMIN — DOPAMINE HYDROCHLORIDE IN DEXTROSE 6.5 MCG/KG/MIN: 1.6 INJECTION, SOLUTION INTRAVENOUS at 06:15

## 2022-01-01 RX ADMIN — SODIUM BICARBONATE: 84 INJECTION, SOLUTION INTRAVENOUS at 12:46

## 2022-01-01 RX ADMIN — VALPROATE SODIUM 500 MG: 100 INJECTION, SOLUTION INTRAVENOUS at 12:18

## 2022-01-01 RX ADMIN — POTASSIUM CHLORIDE 20 MEQ: 29.8 INJECTION INTRAVENOUS at 09:29

## 2022-01-01 RX ADMIN — SODIUM CHLORIDE: 9 INJECTION, SOLUTION INTRAVENOUS at 05:32

## 2022-01-01 RX ADMIN — Medication 2 MCG/MIN: at 20:43

## 2022-01-01 RX ADMIN — SODIUM CHLORIDE: 9 INJECTION, SOLUTION INTRAVENOUS at 01:20

## 2022-01-01 RX ADMIN — IOPAMIDOL 85 ML: 755 INJECTION, SOLUTION INTRAVENOUS at 02:52

## 2022-01-01 RX ADMIN — PROPOFOL 80 MCG/KG/MIN: 10 INJECTION, EMULSION INTRAVENOUS at 08:32

## 2022-01-01 RX ADMIN — SODIUM CHLORIDE: 9 INJECTION, SOLUTION INTRAVENOUS at 14:36

## 2022-01-01 RX ADMIN — SODIUM CHLORIDE: 9 INJECTION, SOLUTION INTRAVENOUS at 10:07

## 2022-01-01 RX ADMIN — POTASSIUM CHLORIDE 20 MEQ: 29.8 INJECTION INTRAVENOUS at 13:19

## 2022-01-01 RX ADMIN — PROPOFOL 62.7 MCG/KG/MIN: 10 INJECTION, EMULSION INTRAVENOUS at 06:48

## 2022-01-01 RX ADMIN — FAMOTIDINE 20 MG: 10 INJECTION INTRAVENOUS at 22:35

## 2022-01-01 RX ADMIN — MIDAZOLAM 1 MG: 1 INJECTION INTRAMUSCULAR; INTRAVENOUS at 04:31

## 2022-01-01 RX ADMIN — FAMOTIDINE 20 MG: 10 INJECTION INTRAVENOUS at 07:38

## 2022-01-01 RX ADMIN — CEFEPIME HYDROCHLORIDE 1000 MG: 1 INJECTION, POWDER, FOR SOLUTION INTRAMUSCULAR; INTRAVENOUS at 15:08

## 2022-01-01 RX ADMIN — PROPOFOL 63 MCG/KG/MIN: 10 INJECTION, EMULSION INTRAVENOUS at 07:48

## 2022-01-01 RX ADMIN — PROPOFOL 80 MCG/KG/MIN: 10 INJECTION, EMULSION INTRAVENOUS at 23:12

## 2022-01-01 RX ADMIN — PROPOFOL 63 MCG/KG/MIN: 10 INJECTION, EMULSION INTRAVENOUS at 04:50

## 2022-01-01 RX ADMIN — PROPOFOL 63 MCG/KG/MIN: 10 INJECTION, EMULSION INTRAVENOUS at 15:18

## 2022-01-01 RX ADMIN — CHLORHEXIDINE GLUCONATE 0.12% ORAL RINSE 15 ML: 1.2 LIQUID ORAL at 23:39

## 2022-01-01 RX ADMIN — VASOPRESSIN 0.04 UNITS/MIN: 20 INJECTION INTRAVENOUS at 00:52

## 2022-01-01 RX ADMIN — MAGNESIUM SULFATE HEPTAHYDRATE 2000 MG: 40 INJECTION, SOLUTION INTRAVENOUS at 11:12

## 2022-01-01 RX ADMIN — VASOPRESSIN 0.04 UNITS/MIN: 20 INJECTION INTRAVENOUS at 09:10

## 2022-01-01 RX ADMIN — PROPOFOL 60 MCG/KG/MIN: 10 INJECTION, EMULSION INTRAVENOUS at 07:34

## 2022-01-01 RX ADMIN — PROPOFOL 80 MCG/KG/MIN: 10 INJECTION, EMULSION INTRAVENOUS at 14:35

## 2022-01-01 RX ADMIN — PROPOFOL 55 MCG/KG/MIN: 10 INJECTION, EMULSION INTRAVENOUS at 10:58

## 2022-01-01 RX ADMIN — POTASSIUM CHLORIDE 20 MEQ: 29.8 INJECTION INTRAVENOUS at 07:51

## 2022-01-01 RX ADMIN — FAMOTIDINE 20 MG: 10 INJECTION INTRAVENOUS at 21:06

## 2022-01-01 RX ADMIN — SODIUM CHLORIDE 2925 ML: 9 INJECTION, SOLUTION INTRAVENOUS at 02:20

## 2022-01-01 RX ADMIN — PROPOFOL 63 MCG/KG/MIN: 10 INJECTION, EMULSION INTRAVENOUS at 20:49

## 2022-01-01 RX ADMIN — PROPOFOL 63 MCG/KG/MIN: 10 INJECTION, EMULSION INTRAVENOUS at 17:56

## 2022-01-01 RX ADMIN — FAMOTIDINE 20 MG: 10 INJECTION INTRAVENOUS at 10:38

## 2022-01-01 RX ADMIN — INSULIN LISPRO 1 UNITS: 100 INJECTION, SOLUTION INTRAVENOUS; SUBCUTANEOUS at 00:13

## 2022-01-01 RX ADMIN — DOPAMINE HYDROCHLORIDE IN DEXTROSE 7.5 MCG/KG/MIN: 1.6 INJECTION, SOLUTION INTRAVENOUS at 09:15

## 2022-01-01 RX ADMIN — DOPAMINE HYDROCHLORIDE IN DEXTROSE 7.5 MCG/KG/MIN: 1.6 INJECTION, SOLUTION INTRAVENOUS at 19:31

## 2022-01-01 RX ADMIN — TOCILIZUMAB 800 MG: 20 INJECTION, SOLUTION, CONCENTRATE INTRAVENOUS at 13:33

## 2022-01-01 RX ADMIN — SODIUM PHOSPHATE, MONOBASIC, MONOHYDRATE AND SODIUM PHOSPHATE, DIBASIC, ANHYDROUS 20 MMOL: 276; 142 INJECTION, SOLUTION INTRAVENOUS at 09:23

## 2022-01-01 RX ADMIN — POTASSIUM CHLORIDE 20 MEQ: 7.46 INJECTION, SOLUTION INTRAVENOUS at 00:08

## 2022-01-01 RX ADMIN — MAGNESIUM SULFATE HEPTAHYDRATE 1000 MG: 1 INJECTION, SOLUTION INTRAVENOUS at 06:13

## 2022-01-01 RX ADMIN — Medication 137.5 MCG/HR: at 13:15

## 2022-01-01 RX ADMIN — DOPAMINE HYDROCHLORIDE IN DEXTROSE 5 MCG/KG/MIN: 1.6 INJECTION, SOLUTION INTRAVENOUS at 23:11

## 2022-01-01 RX ADMIN — MAGNESIUM SULFATE HEPTAHYDRATE 1000 MG: 1 INJECTION, SOLUTION INTRAVENOUS at 06:30

## 2022-01-01 RX ADMIN — PROPOFOL 80 MCG/KG/MIN: 10 INJECTION, EMULSION INTRAVENOUS at 16:47

## 2022-01-01 RX ADMIN — PROPOFOL 5 MCG/KG/MIN: 10 INJECTION, EMULSION INTRAVENOUS at 21:45

## 2022-01-01 RX ADMIN — PROPOFOL 63 MCG/KG/MIN: 10 INJECTION, EMULSION INTRAVENOUS at 23:22

## 2022-01-01 RX ADMIN — PROPOFOL 80 MCG/KG/MIN: 10 INJECTION, EMULSION INTRAVENOUS at 21:08

## 2022-01-01 RX ADMIN — Medication 63 MCG/HR: at 12:30

## 2022-01-01 RX ADMIN — SODIUM CHLORIDE: 9 INJECTION, SOLUTION INTRAVENOUS at 22:46

## 2022-01-01 RX ADMIN — POTASSIUM CHLORIDE 20 MEQ: 29.8 INJECTION INTRAVENOUS at 08:50

## 2022-01-01 RX ADMIN — Medication 137.5 MCG/HR: at 21:13

## 2022-01-01 RX ADMIN — Medication 63 MCG/HR: at 02:53

## 2022-01-01 RX ADMIN — PROPOFOL 80 MCG/KG/MIN: 10 INJECTION, EMULSION INTRAVENOUS at 12:14

## 2022-01-01 RX ADMIN — MAGNESIUM SULFATE HEPTAHYDRATE 1000 MG: 1 INJECTION, SOLUTION INTRAVENOUS at 07:37

## 2022-01-01 RX ADMIN — VALPROATE SODIUM 500 MG: 100 INJECTION, SOLUTION INTRAVENOUS at 00:19

## 2022-01-01 RX ADMIN — FAMOTIDINE 20 MG: 10 INJECTION INTRAVENOUS at 09:14

## 2022-01-01 RX ADMIN — CHLORHEXIDINE GLUCONATE 0.12% ORAL RINSE 15 ML: 1.2 LIQUID ORAL at 09:14

## 2022-01-01 RX ADMIN — CALCIUM GLUCONATE 2000 MG: 20 INJECTION, SOLUTION INTRAVENOUS at 09:32

## 2022-01-01 RX ADMIN — Medication 137.5 MCG/HR: at 12:28

## 2022-01-01 RX ADMIN — Medication 63 MCG/HR: at 18:55

## 2022-01-01 RX ADMIN — MORPHINE SULFATE 2 MG: 4 INJECTION, SOLUTION INTRAMUSCULAR; INTRAVENOUS at 16:47

## 2022-01-01 RX ADMIN — CHLORHEXIDINE GLUCONATE 0.12% ORAL RINSE 15 ML: 1.2 LIQUID ORAL at 10:38

## 2022-01-01 RX ADMIN — PROPOFOL 80 MCG/KG/MIN: 10 INJECTION, EMULSION INTRAVENOUS at 06:13

## 2022-01-01 RX ADMIN — PROPOFOL 80 MCG/KG/MIN: 10 INJECTION, EMULSION INTRAVENOUS at 13:10

## 2022-01-01 RX ADMIN — CHLORHEXIDINE GLUCONATE 0.12% ORAL RINSE 15 ML: 1.2 LIQUID ORAL at 08:33

## 2022-01-01 RX ADMIN — PROPOFOL 80 MCG/KG/MIN: 10 INJECTION, EMULSION INTRAVENOUS at 01:37

## 2022-01-01 RX ADMIN — POTASSIUM CHLORIDE 20 MEQ: 29.8 INJECTION INTRAVENOUS at 00:22

## 2022-01-01 RX ADMIN — PROPOFOL 30 MCG/KG/MIN: 10 INJECTION, EMULSION INTRAVENOUS at 18:25

## 2022-01-01 RX ADMIN — PROPOFOL 63 MCG/KG/MIN: 10 INJECTION, EMULSION INTRAVENOUS at 10:08

## 2022-01-01 RX ADMIN — POTASSIUM CHLORIDE 20 MEQ: 29.8 INJECTION INTRAVENOUS at 14:40

## 2022-01-01 RX ADMIN — FAMOTIDINE 20 MG: 10 INJECTION INTRAVENOUS at 08:33

## 2022-01-01 RX ADMIN — PROPOFOL 63 MCG/KG/MIN: 10 INJECTION, EMULSION INTRAVENOUS at 12:21

## 2022-01-01 RX ADMIN — SODIUM CHLORIDE: 9 INJECTION, SOLUTION INTRAVENOUS at 12:09

## 2022-01-01 RX ADMIN — POTASSIUM CHLORIDE 20 MEQ: 29.8 INJECTION INTRAVENOUS at 12:16

## 2022-01-01 RX ADMIN — PROPOFOL 63 MCG/KG/MIN: 10 INJECTION, EMULSION INTRAVENOUS at 01:51

## 2022-01-01 RX ADMIN — FAMOTIDINE 20 MG: 10 INJECTION INTRAVENOUS at 23:40

## 2022-01-01 RX ADMIN — MAGNESIUM SULFATE HEPTAHYDRATE 1000 MG: 1 INJECTION, SOLUTION INTRAVENOUS at 07:23

## 2022-01-01 RX ADMIN — VASOPRESSIN 0.03 UNITS/MIN: 20 INJECTION INTRAVENOUS at 15:23

## 2022-01-01 RX ADMIN — PROPOFOL 80 MCG/KG/MIN: 10 INJECTION, EMULSION INTRAVENOUS at 18:49

## 2022-01-01 RX ADMIN — HEPARIN SODIUM 10 UNITS/KG/HR: 10000 INJECTION, SOLUTION INTRAVENOUS at 00:09

## 2022-01-01 RX ADMIN — PROPOFOL 80 MCG/KG/MIN: 10 INJECTION, EMULSION INTRAVENOUS at 10:51

## 2022-01-01 RX ADMIN — Medication 12.5 MCG/HR: at 03:30

## 2022-01-01 RX ADMIN — CHLORHEXIDINE GLUCONATE 0.12% ORAL RINSE 15 ML: 1.2 LIQUID ORAL at 21:06

## 2022-01-01 RX ADMIN — DEXAMETHASONE SODIUM PHOSPHATE 10 MG: 10 INJECTION INTRAMUSCULAR; INTRAVENOUS at 10:55

## 2022-01-01 RX ADMIN — PROPOFOL 80 MCG/KG/MIN: 10 INJECTION, EMULSION INTRAVENOUS at 10:01

## 2022-01-01 RX ADMIN — DEXTROSE MONOHYDRATE 1500 MG: 50 INJECTION, SOLUTION INTRAVENOUS at 18:56

## 2022-01-01 RX ADMIN — PHYTONADIONE 10 MG: 10 INJECTION, EMULSION INTRAMUSCULAR; INTRAVENOUS; SUBCUTANEOUS at 10:59

## 2022-01-01 RX ADMIN — Medication 137.5 MCG/HR: at 03:57

## 2022-01-01 RX ADMIN — SODIUM CHLORIDE: 9 INJECTION, SOLUTION INTRAVENOUS at 20:50

## 2022-01-01 RX ADMIN — CHLORHEXIDINE GLUCONATE 0.12% ORAL RINSE 15 ML: 1.2 LIQUID ORAL at 07:37

## 2022-01-01 RX ADMIN — POTASSIUM CHLORIDE 20 MEQ: 29.8 INJECTION INTRAVENOUS at 03:20

## 2022-01-01 ASSESSMENT — PULMONARY FUNCTION TESTS
PIF_VALUE: 27
PIF_VALUE: 35
PIF_VALUE: 21
PIF_VALUE: 23
PIF_VALUE: 27
PIF_VALUE: 25
PIF_VALUE: 21
PIF_VALUE: 21
PIF_VALUE: 23
PIF_VALUE: 25
PIF_VALUE: 26
PIF_VALUE: 22
PIF_VALUE: 36
PIF_VALUE: 21
PIF_VALUE: 21
PIF_VALUE: 22
PIF_VALUE: 27
PIF_VALUE: 26
PIF_VALUE: 21
PIF_VALUE: 26
PIF_VALUE: 32
PIF_VALUE: 23
PIF_VALUE: 23
PIF_VALUE: 22
PIF_VALUE: 21
PIF_VALUE: 25
PIF_VALUE: 31
PIF_VALUE: 23
PIF_VALUE: 22
PIF_VALUE: 35
PIF_VALUE: 22
PIF_VALUE: 24
PIF_VALUE: 23
PIF_VALUE: 21
PIF_VALUE: 21
PIF_VALUE: 24
PIF_VALUE: 26
PIF_VALUE: 23
PIF_VALUE: 26
PIF_VALUE: 25
PIF_VALUE: 29
PIF_VALUE: 29
PIF_VALUE: 22
PIF_VALUE: 24
PIF_VALUE: 23
PIF_VALUE: 26
PIF_VALUE: 23
PIF_VALUE: 24
PIF_VALUE: 21
PIF_VALUE: 24
PIF_VALUE: 24
PIF_VALUE: 21
PIF_VALUE: 22
PIF_VALUE: 26
PIF_VALUE: 25
PIF_VALUE: 25
PIF_VALUE: 23
PIF_VALUE: 25
PIF_VALUE: 21
PIF_VALUE: 24
PIF_VALUE: 23
PIF_VALUE: 34
PIF_VALUE: 24
PIF_VALUE: 21
PIF_VALUE: 22
PIF_VALUE: 22
PIF_VALUE: 28
PIF_VALUE: 34
PIF_VALUE: 22
PIF_VALUE: 23
PIF_VALUE: 27
PIF_VALUE: 21
PIF_VALUE: 23
PIF_VALUE: 24
PIF_VALUE: 32
PIF_VALUE: 21
PIF_VALUE: 25
PIF_VALUE: 23
PIF_VALUE: 25
PIF_VALUE: 31
PIF_VALUE: 24
PIF_VALUE: 22
PIF_VALUE: 25
PIF_VALUE: 23
PIF_VALUE: 25
PIF_VALUE: 31
PIF_VALUE: 22
PIF_VALUE: 37
PIF_VALUE: 24
PIF_VALUE: 23
PIF_VALUE: 25
PIF_VALUE: 21
PIF_VALUE: 23
PIF_VALUE: 28
PIF_VALUE: 22
PIF_VALUE: 26
PIF_VALUE: 25
PIF_VALUE: 28
PIF_VALUE: 23
PIF_VALUE: 25
PIF_VALUE: 23
PIF_VALUE: 23
PIF_VALUE: 35
PIF_VALUE: 24
PIF_VALUE: 22
PIF_VALUE: 23
PIF_VALUE: 21
PIF_VALUE: 22
PIF_VALUE: 24
PIF_VALUE: 25
PIF_VALUE: 23
PIF_VALUE: 21
PIF_VALUE: 28
PIF_VALUE: 27
PIF_VALUE: 25
PIF_VALUE: 25
PIF_VALUE: 24
PIF_VALUE: 23
PIF_VALUE: 22
PIF_VALUE: 23
PIF_VALUE: 28
PIF_VALUE: 23
PIF_VALUE: 31
PIF_VALUE: 25
PIF_VALUE: 21
PIF_VALUE: 21
PIF_VALUE: 22
PIF_VALUE: 36
PIF_VALUE: 21
PIF_VALUE: 25
PIF_VALUE: 31
PIF_VALUE: 22
PIF_VALUE: 46
PIF_VALUE: 23
PIF_VALUE: 30
PIF_VALUE: 22
PIF_VALUE: 25
PIF_VALUE: 25
PIF_VALUE: 26
PIF_VALUE: 24
PIF_VALUE: 22
PIF_VALUE: 24
PIF_VALUE: 26
PIF_VALUE: 34
PIF_VALUE: 21
PIF_VALUE: 22
PIF_VALUE: 23
PIF_VALUE: 23
PIF_VALUE: 35
PIF_VALUE: 23
PIF_VALUE: 21
PIF_VALUE: 21
PIF_VALUE: 31
PIF_VALUE: 21
PIF_VALUE: 21
PIF_VALUE: 29
PIF_VALUE: 23
PIF_VALUE: 22
PIF_VALUE: 35
PIF_VALUE: 28
PIF_VALUE: 21
PIF_VALUE: 22
PIF_VALUE: 24
PIF_VALUE: 29
PIF_VALUE: 23
PIF_VALUE: 26
PIF_VALUE: 27
PIF_VALUE: 39
PIF_VALUE: 24
PIF_VALUE: 21
PIF_VALUE: 25
PIF_VALUE: 24
PIF_VALUE: 25
PIF_VALUE: 23
PIF_VALUE: 24
PIF_VALUE: 21
PIF_VALUE: 25
PIF_VALUE: 26
PIF_VALUE: 24
PIF_VALUE: 21
PIF_VALUE: 24
PIF_VALUE: 26
PIF_VALUE: 24
PIF_VALUE: 23
PIF_VALUE: 25
PIF_VALUE: 31
PIF_VALUE: 23
PIF_VALUE: 32
PIF_VALUE: 24
PIF_VALUE: 25
PIF_VALUE: 32
PIF_VALUE: 34
PIF_VALUE: 21
PIF_VALUE: 25
PIF_VALUE: 24
PIF_VALUE: 21
PIF_VALUE: 22
PIF_VALUE: 22
PIF_VALUE: 23
PIF_VALUE: 21

## 2022-01-01 ASSESSMENT — PAIN SCALES - GENERAL
PAINLEVEL_OUTOF10: 7
PAINLEVEL_OUTOF10: 0

## 2022-01-08 PROBLEM — I46.9 CARDIAC ARREST (HCC): Status: ACTIVE | Noted: 2022-01-01

## 2022-01-08 NOTE — Clinical Note
Patient Class: Inpatient [101]   REQUIRED: Diagnosis: Cardiac arrest ([facility] [place]. 5. ICD-9-CM]   Estimated Length of Stay: Estimated stay of more than 2 midnights   Admitting Provider: Selvin Villatoro [[id number]]   Telemetry/Cardiac Monitoring Required?: Yes

## 2022-01-09 NOTE — PROGRESS NOTES
Dr. Jimmy Ha at patient bedside aware of patient electrolytes from 15:00 pm (can not replace potassium 8 hour prior to rewarming current potassium is 3.0) This nurse has replaced potassium twice with a total of 120 MEQ of potassium, also patient urine is colorless 4,000 ml total as of 17:00 pm.  No new orders at this time.

## 2022-01-09 NOTE — PROGRESS NOTES
PHARMACY ANTICOAGULATION [address] is a 66 y.o. female on warfarin therapy for atrial fibrillation, mechanical mitral valve and history of CVA. Pharmacy consulted by Dr. Lizzie Mix for monitoring and adjustment of treatment. Indication for anticoagulation: A-Fib, Hx of CVA, mechanical mitral valve  INR goal: 2.5-3.5  Warfarin dose prior to admission: reported 2 mg daily    Pertinent Laboratory Values   Recent Labs     01/08/22 2053   INR 4.12   HGB 10.2*   HCT 34.1*          Assessment/Plan:  Drug Interactions: heparin - therapeutic dose  INR supra-therapeutic @ 4.12  Will hold warfarin until INR is within therapeutic range  Pharmacy will continue to monitor and adjust warfarin therapy as indicated    Thank you for the consult.   Bob Panchal, [facility]  1/9/2022 2:06 AM

## 2022-01-09 NOTE — PROGRESS NOTES
Dr. Kemi Abebe at patient bedside, updated on patient hemodynamic status and labs from this morning. VS- HR 60 (afib), RR-16, SpO2 100 % on ventilator settings Fio2- 100 %, peep- 5, Vt- 500, Arterial line BP- 117/58 (80). Patient placed on arctic sun in ED, Temp- 32.7.

## 2022-01-09 NOTE — ED PROVIDER NOTES
eMERGENCY dEPARTMENT eNCOUnter      CHIEF COMPLAINT:   Cardiopulmonary arrest    CRITICAL CARE NOTE:  There was a high probability of clinically significant life-threatening deterioration of the patient's condition requiring my urgent intervention. Total critical care time is 45 minutes  This includes vital sign monitoring, pulse oximetry monitoring, telemetry monitoring, clinical response to the IV medications, reviewing the nursing notes, consultation time, dictation/documetation time. (This time excludes time spent performing procedures). HPI: Roderick Bunch is a 66 y.o. female who presents to the emergency department, via EMS, after she was found pulseless and apneic. EMS states that the patient was at home with her  sitting in a chair when she slumped over and was found to not be breathing. She had no pulse. No CPR was performed prior to EMS arrival, but they state that they were there quickly. They started CPR and ACLS. An interosseous IV was placed. She was intubated. She was found to be in V. fib and was shocked with ROSC. Amiodarone was started. IV fluids were initiated. She subsequently lost pulses 2 more times but ROSC was achieved again. She arrives with a pulse, ET tube with bagging, IV fluids and amiodarone infusing. No further information is able to be obtained. REVIEW OF SYSTEMS:   \"Remaining review of systems unable to obtain due to patient condition. I have reviewed the nursing triage documentation and agree unless otherwise noted below. \"      PAST MEDICAL HISTORY:   Past Medical History:   Diagnosis Date    Afib [facility])     ablation 1997    Anticoagulated on Coumadin     **PCP follows pt's PT/INRs, along w/prescribing her Coumadin. **    Atrial fibrillation and flutter ([address] 01/01/2005    On Coumadin.  Atrioventricular godwin re-entry tachycardia ([facility] [address]     H/O echocardiogram 10/26/2006    EF 45-50%. Mod to sev septal hypokinesis. Bi-leaflet ([facility]), mechanical prosthesis.  H/O prior ablation treatment 01/01/1997    Dr. Wolfe Late H/O rheumatic heart disease     H/O: CVA (cerebrovascular accident)     History of mitral valve prosthesis     Metallic medtronic valve    HTN (hypertension)     Hx of cardiovascular stress test 09/30/2015    lexiscan-mild to moderate ischemia basal inferior and mid inferior,EF56%    Hx of echocardiogram 09/30/2015    EF. 45-50%. Moderate left atrial enlargement. Regional wall motion abnormality w/ probably mild reduction of LVSF. Normal sized abd aorta at 2.2cm. Mild aortic regurgitation.  Hx of echocardiogram 06/22/2021    50-55%. mechanical prosthetic valve is present with a meangradient of 2mmHg    Hyperlipemia     Mitral valve prolapse     S/P MVR (mitral valve repair) 12/23/1991    VHD (valvular heart disease) 01/01/1998       CURRENT MEDICATIONS:   Home medications reviewed.     SURGICAL HISTORY:   Past Surgical History:   Procedure Laterality Date    CARDIAC SURGERY      CARDIAC VALVE SURGERY      MITRAL VALVE REPLACEMENT  2/11/98    [employer] serial # [id number], model #     TUBAL LIGATION         FAMILY HISTORY:   Family History   Problem Relation Age of Onset    Heart Disease Father        SOCIAL HISTORY:   Social History     Socioeconomic History    Marital status:      Spouse name: Not on file    Number of children: Not on file    Years of education: Not on file    Highest education level: Not on file   Occupational History    Not on file   Tobacco Use    Smoking status: Former Smoker    Smokeless tobacco: Never Used   Substance and Sexual Activity    Alcohol use: No     Alcohol/week: 0.0 standard drinks    Drug use: No    Sexual activity: Yes     Partners: Male     Comment:    Other Topics Concern    Not on file   Social History Narrative    Not on file     Social Determinants of Health     Financial Resource Strain:     Difficulty of Paying Living Expenses: Not on file   Food Insecurity:     Worried About [facility] in the Last Year: Not on file    Roderick of Food in the Last Year: Not on file   Transportation Needs:     Lack of Transportation (Medical): Not on file    Lack of Transportation (Non-Medical): Not on file   Physical Activity:     Days of Exercise per Week: Not on file    Minutes of Exercise per Session: Not on file   Stress:     Feeling of Stress : Not on file   Social Connections:     Frequency of Communication with Friends and Family: Not on file    Frequency of Social Gatherings with Friends and Family: Not on file    Attends Anabaptist Services: Not on file    Active Member of Clubs or Organizations: Not on file    Attends Club or Organization Meetings: Not on file    Marital Status: Not on file   Intimate Partner Violence:     Fear of Current or Ex-Partner: Not on file    Emotionally Abused: Not on file    Physically Abused: Not on file    Sexually Abused: Not on file   Housing Stability:     Unable to Pay for Housing in the Last Year: Not on file    Number of [place] in the Last Year: Not on file    Unstable Housing in the Last Year: Not on file       ALLERGIES: Adenosine, Pravachol [pravastatin sodium], and Statins    PHYSICAL EXAM:  VITAL SIGNS:   ED Triage Vitals   Enc Vitals Group      BP 01/08/22 2053 (!) 138/93      Pulse 01/08/22 2045 70      Resp 01/08/22 2045 27      Temp --       Temp src --       SpO2 --       Weight --       Height --       Head Circumference --       Peak Flow --       Pain Score --       Pain Loc --       Pain Edu? --       Excl. in [address]? --      Constitutional:  Unresponsive  HENT: Normocephalic, Atraumatic, Bilateral external ears normal, Oropharynx moist without foreign body, Nose normal.  Eyes:  Pupils are 2 mm bilaterally and unreactive, eyes are open  Neck: Trachea midline.  No stridor  Cardiovascular:  Heart sounds normal, irregular rhythm  Pulmonary/Chest:  No spontaneous respirations, equal breath sounds with bagging  Abdomen: Non-distended. No signs of trauma. Extremities: Cool extremities. Delayed capillary refill. No deformity, cast noted to left upper extremity  Neurologic: Unresponsive. No response to painful stimuli  Skin:  Cool, Pale      EKG Interpretation  Interpreted by me  Paired to 7/4/2021  Rhythm: atrial fibrillation -controlled  Rate: normal 67  Axis: normal  Ectopy: none  Conduction: irregularly irregular  ST Segments: ST depression in leads V5 and V6  T Waves: t-wave inversions in leads aVL, aVF  Clinical Impression: atrial fibrillation with controlled ventricular rate, ST depression in leads V5 and V6, T wave inversions in leads aVL and aVF, no ST elevation noted    Cardiac Monitor Strip Interpretation  Interpreted by me  Monitor strip interpreted for greater than 10 seconds  Rhythm: atrial fibrillation  Rate: normal  Ectopy: none  ST Segments: normal      Radiology / Procedures:  Please see central line procedure note of ALVIN Saucedo (Final result)  Result time 01/09/22 04:37:04  Final result by Moraima Javed (01/09/22 04:37:04)                Impression:    1.  No hemothorax, pneumothorax, or large area of consolidation.  There are   fractures in the anterior left 3rd through possibly 5th rib and questionably   at the right 4th and 5th rib. 2.  No large or central pulmonary arterial embolism.  Respiratory motion   degrades some of the mid to distal branches without a convincing evidence for   pulmonary embolism. 3.  Dilated right atrium     4.  Old granulomatous disease with calcified and noncalcified nodules in the   upper lobes.  Evaluation in the lower lungs is degraded by the motion   artifact.  Comparison with any old outside studies would be beneficial.  If   no prior studies could have follow-up chest CT for full evaluation once the   patient is stabilized and over the acute issues.      RECOMMENDATIONS:   Unavailable             Narrative: EXAMINATION:   CTA OF THE CHEST 1/9/2022 2:50 am     TECHNIQUE:   CTA of the chest was performed after the administration of intravenous   contrast.  Multiplanar reformatted images are provided for review.  MIP   images are provided for review. Dose modulation, iterative reconstruction,   and/or weight based adjustment of the mA/kV was utilized to reduce the   radiation dose to as low as reasonably achievable. COMPARISON:   None. HISTORY:   ORDERING SYSTEM PROVIDED HISTORY: Cardiac arrest   TECHNOLOGIST PROVIDED HISTORY:   Reason for exam:->Cardiac arrest   Decision Support Exception - unselect if not a suspected or confirmed   emergency medical condition->Emergency Medical Condition (MA)   Reason for Exam: Cardiac arrest     FINDINGS:   Pulmonary Arteries: No large or central pulmonary embolism.  Respiratory   motion artifact degrades some of the mid and distal branches.  No definite   pulmonary embolism is identified. .     Mediastinum: No thoracic aortic aneurysm.  The heart is enlarged with   dilatation of the right atrium.  There is no pericardial effusion or   mediastinal hematoma.  Cluster of calcifications/lymph nodes in the right   paratracheal region are noted.  Additional small calcified lymph nodes in the   subcarinal region and right hilum. Lungs/pleura: Pulmonary emphysematous changes and atelectatic changes are   present with prominent motion artifact.  There is dependent atelectasis in   the posterior lung bases but no significant pleural effusion and no   pneumothorax.  Several 3-5 mm nodules without calcification are present in   the right upper lobe and apex.  Areas in the mid and lower chest have   degraded evaluation for nodularity due to the motion artifact.  Some linear   scarring and small calcified granulomas are present in the periphery of right   and left upper lobes to apices.      Upper Abdomen: IVC as high-density contrast from the lower approach   injection.  Nasogastric tube is within the stomach. Jacquelyn Cambric is no free air   free fluid in the upper abdomen.  Epicardial leads are present.  Endotracheal   tube is also acceptable. Soft Tissues/Bones: Sternotomy wires and chronic deformity of the sternum. Degenerative changes in the spine.  There are nondisplaced fractures in the   anterior left 3rd, 4th and possibly 5th rib.  Questionable deformity at the   anterior junction of the right 4th and 5th rib.                       CT HEAD WO CONTRAST (Preliminary result)  Result time 01/09/22 04:30:06  Preliminary result by Lc Celestin MD (01/09/22 04:30:06)                Impression:    No acute intracranial abnormality. Stable chronic changes described above. RECOMMENDATIONS:   Unavailable                       XR CHEST PORTABLE (Final result)  Result time 01/08/22 21:40:38  Final result by Barry Retana MD (01/08/22 21:40:38)                Impression:    1. Endotracheal tube tip is 4.6 cm above the randy. 2. Enteric tube tip side port is at the gastroesophageal junction.  The tube   should be advanced. 3. Unchanged cardiomegaly without acute abnormality. Narrative:    EXAMINATION:   ONE XRAY VIEW OF THE CHEST     1/8/2022 8:50 pm     COMPARISON:   07/04/2021     HISTORY:   Patient has been intubated status post cardiac arrest.     FINDINGS:   Endotracheal tube tip is 4.6 cm above the randy.  Enteric tube tip courses   in the stomach with side port just above the gastroesophageal junction. Unchanged cardiomegaly.  Sternotomy changes.  No acute airspace disease,   pleural effusion, or pneumothorax.                      Labs Reviewed   COVID-19, RAPID - Abnormal; Notable for the following components:       Result Value    SARS-CoV-2, NAAT DETECTED (*)     All other components within normal limits   CBC WITH AUTO DIFFERENTIAL - Abnormal; Notable for the following components:    RBC 3.90 (*)     Hemoglobin 10.2 (*)     Hematocrit 34.1 (*)     MCH 26.2 (*) MCHC 29.9 (*)     RDW 15.0 (*)     Lymphocytes % 50.6 (*)     Monocytes % 4.7 (*)     Immature Neutrophil % 4.8 (*)     All other components within normal limits   COMPREHENSIVE METABOLIC PANEL - Abnormal; Notable for the following components:    Sodium 133 (*)     Potassium 3.2 (*)     Chloride 98 (*)     CO2 14 (*)     Glucose 237 (*)     Albumin 3.0 (*)     Total Protein 6.3 (*)     ALT 8 (*)     AST 41 (*)     Anion Gap 21 (*)     All other components within normal limits   BRAIN NATRIURETIC PEPTIDE - Abnormal; Notable for the following components:    Pro-BNP 1,490 (*)     All other components within normal limits   PROTIME/INR & PTT - Abnormal; Notable for the following components:    Protime 53.9 (*)     aPTT 44.5 (*)     All other components within normal limits   BLOOD GAS, VENOUS - Abnormal; Notable for the following components:    pCO2, J Carlos 29 (*)     pO2, J Carlos 116 (*)     Base Excess 8 (*)     HCO3, Venous 16.0 (*)     O2 Sat, J Carlos 96.4 (*)     All other components within normal limits   TROPONIN - Abnormal; Notable for the following components:    Troponin T 0.190 (*)     All other components within normal limits   BLOOD GAS, ARTERIAL - Abnormal; Notable for the following components:    pH, Bld 7.31 (*)     pO2, Arterial 253 (*)     Base Excess 9 (*)     HCO3, Arterial 16.1 (*)     CO2 Content 17.1 (*)     All other components within normal limits   POCT GLUCOSE - Abnormal; Notable for the following components:       ED COURSE & MEDICAL DECISION MAKING:  Pertinent Labs & Imaging studies reviewed. (See chart for details)  Differential diagnosis: MI, overdose, respiratory arrest, pneumonia, CVA, intracranial hemorrhage, trauma, sepsis, other. The likelihood of other entities in the differential is insufficient to justify any further testing for them at this time. The patient presents in full cardiac arrest of uncertain etiology.  Prehospital interventions were initiated per ACLS protocol and include endotracheal intubation and bagging, chest compressions, IV epinephrine, defibrillation, IV fluids and an amiodarone drip. On arrival, the patient is unresponsive with ROSC. IV access was obtained per EMS with IO placement in the right tibia and additional lines were placed in the emergency department. A central line was placed per the ALVIN. IV fluids were continued as well as amiodarone. Labs are obtained and are significant for mild hypokalemia and an INR of 4.12. Glucose is 248. She is anemic with a hemoglobin of 10.2 which appears to be chronic in nature. Bicarb is low at 14. Anion gap is elevated at 21. Initial troponin is negative. She is positive for COVID. Chest x-ray shows the ET tube tip 4.6 cm above the randy. Enteric tube tip side-port is in the gastroesophageal junction and should be advanced. There is unchanged cardiomegaly without acute abnormality. CT head and CTA of the chest are pending. Family was updated and brought back to the room to visit patient. I spoke with the cardiologist on-call, Dr. Lucía Willson, who recommended initiation of the Arctic sun protocol. He recommended heparin which was given. He will see the patient in consult. An art line was graciously placed per Alysia Wu CRNA. I spoke with the hospitalist who will admit the patient. Of note, the patient was becoming progressively more bradycardic while awaiting a bed. Her blood pressure remained stable. Her amiodarone had finished infusing. She was started on a dopamine drip. Clinical Impression:  1. Cardiac arrest ([address]    2. Hypokalemia    3. COVID-19    4. Low bicarbonate level          Comment: Please note this report has been produced using speech recognition software and may contain errors related to that system including errors in grammar, punctuation, and spelling, as well as words and phrases that may be inappropriate.  If there are any questions or concerns please feel free to contact the dictating provider for clarification.         Fabiola Jay MD  01/09/22 [id number]

## 2022-01-09 NOTE — PROGRESS NOTES
Patient having multiple PVC's, with bigeminy pvc's. Heart rate dropping in the 40's and then coming back up to 60's. Dr. Aldair Bowser is aware no new orders at this time.

## 2022-01-09 NOTE — CODE DOCUMENTATION
Patient to trauma 3 via EMS. Patient was witnessed cardiac arrest by . Patient was talking when she slumped over and became unresponsive. CPR was initiated on EMS arrival and shocked. ROSC obtained. Dr. Erin Christy at bedside on patient arrival. Patient remains unresponsive but vss at this time. Patient also intubated per EMS.

## 2022-01-09 NOTE — CONSULTS
Endocrinology   Consult Note      Dear Doctor  Quang Saenz    Thank You for the Consult     Pt. Was Admitted for : Cardiac arrest    Reason for Consult: Better control of blood glucose patient on Protonix protocol with Arctic pump      History Obtained From:  EMR       HISTORY OF PRESENT ILLNESS:                The patient is a 66 y.o. female with significant past medical history of atrial fibrillation on anticoagulation /status post  mitral valve repair/replacement CVA history, hypertension, lipidemia was found to have cardiac arrest while talking to her . Patient was resuscitated intubated brought to hospital admitted in Covid unit as she was positive for Covid. She was placed on ventilator and also is on hypothermic protocol using Arctic pump is not known diabetic blood glucose glucose are running higher she was shocked more multiple times she was found to be in V. tach. I was  consulted for better control of blood glucose. ROS:   Pt's ROS done in detail. Abnormal ROS are noted in Medical and Surgical History Section below: Other Medical History:        Diagnosis Date    Afib [facility])     ablation 1997    Anticoagulated on Coumadin     **PCP follows pt's PT/INRs, along w/prescribing her Coumadin. **    Atrial fibrillation and flutter ([address] 01/01/2005    On Coumadin.  Atrioventricular godwin re-entry tachycardia ([address]     H/O echocardiogram 10/26/2006    EF 45-50%. Mod to sev septal hypokinesis. Bi-leaflet ([facility]), mechanical prosthesis.  H/O prior ablation treatment 01/01/1997    Dr. Danette Mayen H/O rheumatic heart disease     H/O: CVA (cerebrovascular accident)     History of mitral valve prosthesis     Metallic medtronic valve    HTN (hypertension)     Hx of cardiovascular stress test 09/30/2015    lexiscan-mild to moderate ischemia basal inferior and mid inferior,EF56%    Hx of echocardiogram 09/30/2015    EF. 45-50%. Moderate left atrial enlargement.  Regional wall motion abnormality w/ probably mild reduction of LVSF. Normal sized abd aorta at 2.2cm. Mild aortic regurgitation.  Hx of echocardiogram 06/22/2021    50-55%. mechanical prosthetic valve is present with a meangradient of 2mmHg    Hyperlipemia     Mitral valve prolapse     S/P MVR (mitral valve repair) 12/23/1991    VHD (valvular heart disease) 01/01/1998     Surgical History:        Procedure Laterality Date    CARDIAC SURGERY      CARDIAC VALVE SURGERY      MITRAL VALVE REPLACEMENT  2/11/98    [employer] serial # [id number], model #     TUBAL LIGATION         Allergies:  Adenosine, Pravachol [pravastatin sodium], and Statins    Family History:       Problem Relation Age of Onset    Heart Disease Father      REVIEW OF SYSTEMS:  Review of System Done as noted above     PHYSICAL EXAM:      Vitals:    /65   Pulse 61   Temp (!) 91.6 °F (33.1 °C) (Core)   Resp 15   Wt 215 lb (97.5 kg)   SpO2 100%   BMI 33.67 kg/m²    alert, cooperative, appears stated age   EYES:  vision intact Fundoscopic Exam not performed     CONSTITUTIONAL: Patient is sedated, intubated and on ventilator. Also on Arctic pump as part of hypothermic protocol  ,ENT:Normal  NECK:  Supple, No JVD. Thyroid Exam:Normal   LUNGS:  Has Vesicular Breath Sounds,   CARDIOVASCULAR: Atrial fibrillation  ABDOMEN:  No scars, normal bowel sounds, soft, non-distended, non-tender, no masses palpated, no hepatolienomegaly  Musculoskeletal: Normal  Extremities: Normal, peripheral pulses normal, , has no edema   NEUROLOGIC: Patient is sedated, intubated and on ventilator.     DATA:    CBC:   Recent Labs     01/08/22 2053   WBC 7.7   HGB 10.2*       CMP:  Recent Labs     01/08/22 2053 01/09/22  0341 01/09/22  1003   * 131* 134*   K 3.2* 2.7* 2.9*   CL 98* 99 102   CO2 14* 16* 16*   BUN 6 10 8   CREATININE 0.8 0.8 0.6   CALCIUM 8.5 7.9* 8.5   PROT 6.3*  --   --    LABALBU 3.0*  --   --    BILITOT 0.3  --   --    ALKPHOS 65  --   --    AST 41*  --   --    ALT 8*  --   --      Lipids:   Lab Results   Component Value Date    CHOL 172 07/05/2021    HDL 51 07/05/2021    TRIG 109 07/05/2021     Glucose:   Recent Labs     01/08/22  2045 01/09/22  1027 01/09/22  1124   POCGLU 248* 265* 253*     Hemoglobin A1C:   Lab Results   Component Value Date    LABA1C 6.4 07/05/2021     Free T4:   Lab Results   Component Value Date    T4FREE 1.04 10/29/2010     Free T3:   Lab Results   Component Value Date    FT3 2.3 10/29/2010     TSH High Sensitivity:   Lab Results   Component Value Date    TSHHS 1.107 10/29/2010       CTA PULMONARY W CONTRAST   Final Result   1. No hemothorax, pneumothorax, or large area of consolidation. There are   fractures in the anterior left 3rd through possibly 5th rib and questionably   at the right 4th and 5th rib. 2.  No large or central pulmonary arterial embolism. Respiratory motion   degrades some of the mid to distal branches without a convincing evidence for   pulmonary embolism. 3.  Dilated right atrium      4. Old granulomatous disease with calcified and noncalcified nodules in the   upper lobes. Evaluation in the lower lungs is degraded by the motion   artifact. Comparison with any old outside studies would be beneficial.  If   no prior studies could have follow-up chest CT for full evaluation once the   patient is stabilized and over the acute issues. RECOMMENDATIONS:   Unavailable         CT HEAD WO CONTRAST   Preliminary Result   No acute intracranial abnormality. Stable chronic changes described above. RECOMMENDATIONS:   Unavailable         XR CHEST PORTABLE   Final Result   1. Endotracheal tube tip is 4.6 cm above the randy. 2. Enteric tube tip side port is at the gastroesophageal junction. The tube   should be advanced. 3. Unchanged cardiomegaly without acute abnormality.                 Scheduled Medicines   Medications:    famotidine (PEPCID) injection  20 mg IntraVENous BID    warfarin

## 2022-01-09 NOTE — ANESTHESIA POSTPROCEDURE EVALUATION
Department of Anesthesiology  Postprocedure Note    Patient: Juliana Garcia  MRN: [id number]  YOB: 1950  Date of evaluation: 1/8/2022  Time:  11:05 PM     Procedure Summary     Date: 01/08/22 Room / Location:     Anesthesia Start: 9870 Anesthesia Stop:     Procedure: Line Placement Diagnosis:     Scheduled Providers:  Responsible Provider:     Anesthesia Type: Not recorded ASA Status: Not recorded          Anesthesia Type: No value filed. Sumi Phase I:      Sumi Phase II:      Last vitals: Reviewed and per EMR flowsheets.        Anesthesia Post Evaluation    Patient location during evaluation: ED  Patient participation: complete - patient cannot participate  Level of consciousness: sedated and ventilated  Pain score: 0  Airway patency: patent  Nausea & Vomiting: no vomiting and no nausea  Complications: no  Cardiovascular status: hemodynamically stable  Respiratory status: intubated and ventilator  Hydration status: stable

## 2022-01-09 NOTE — CONSULTS
CARDIOLOGY CONSULT NOTE   Reason for consultation: cardiac arrest    Referring physician:  Atif Mercado DO     Primary care physician: Lily Baer MD      Dear Atif Mercado DO   Thanks for the consult. History of present illness:Celina is a 66 y. o.year old who  presents with cardiac arrest at home, her  called EMS and by the time EMS came ( no CPR was done in between),she was found to be in vfib and was shocked and had spontaneous circlulation, she is supposed to be on coumadin for metallic mitral valve and afib, her INR was 4.12, she was started on hypothermia protocol and heparin drip was stopped, all information were obtained after review of medical records and discussion with staff. Chief Complaint   Patient presents with    Cardiac Arrest     Blood pressure, cholesterol, blood glucose and weight are well controlled. Past medical history:    has a past medical history of Afib ([address] Anticoagulated on Coumadin, Atrial fibrillation and flutter ([address] Atrioventricular godwin re-entry tachycardia ([address] H/O echocardiogram, H/O prior ablation treatment, H/O rheumatic heart disease, H/O: CVA (cerebrovascular accident), History of mitral valve prosthesis, HTN (hypertension), Hx of cardiovascular stress test, Hx of echocardiogram, Hx of echocardiogram, Hyperlipemia, Mitral valve prolapse, S/P MVR (mitral valve repair), and VHD (valvular heart disease). Past surgical history:   has a past surgical history that includes Cardiac surgery; Cardiac valuve replacement; Tubal ligation; and Mitral valve replacement (2/11/98). Social History:   reports that she has quit smoking. She has never used smokeless tobacco. She reports that she does not drink alcohol and does not use drugs.   Family history:   no family history of CAD, STROKE of DM    Allergies   Allergen Reactions    Adenosine     Pravachol [Pravastatin Sodium] Other (See Comments)     Muscle aches, stomach ache    Statins Other (See Comments)     Causes muscle aches.        0.9 % sodium chloride infusion, Continuous  prochlorperazine (COMPAZINE) injection 10 mg, Q6H PRN  famotidine (PEPCID) injection 20 mg, BID  fentaNYL (SUBLIMAZE) 1,000 mcg in sodium chloride 0.9% 100 mL infusion, Continuous  midazolam PF (VERSED) injection 1 mg, Q30 Min PRN  warfarin (COUMADIN) daily dosing (placeholder), RX Placeholder  potassium chloride 20 mEq/50 mL IVPB (Central Line), PRN  magnesium sulfate 1000 mg in dextrose 5% 100 mL IVPB, PRN  sodium phosphate 20 mmol in dextrose 5 % 250 mL IVPB, Once  insulin regular (HUMULIN R;NOVOLIN R) 100 Units in sodium chloride 0.9 % 100 mL infusion, Continuous  ondansetron (ZOFRAN-ODT) disintegrating tablet 4 mg, Q8H PRN   Or  ondansetron (ZOFRAN) injection 4 mg, Q6H PRN  chlorhexidine (PERIDEX) 0.12 % solution 15 mL, BID  propofol injection, Continuous  heparin (porcine) injection 4,000 Units, PRN  heparin (porcine) injection 2,000 Units, PRN  heparin 25,000 units in dextrose 5% 250 mL (premix) infusion, Continuous  DOPamine (INTROPIN) 400 mg in dextrose 5 % 250 mL infusion, Continuous      Current Facility-Administered Medications   Medication Dose Route Frequency Provider Last Rate Last Admin    0.9 % sodium chloride infusion   IntraVENous Continuous Levi Manuele,  mL/hr at 01/09/22 0532 New Bag at 01/09/22 0532    prochlorperazine (COMPAZINE) injection 10 mg  10 mg IntraVENous Q6H PRN Alek Ukiah, DO        famotidine (PEPCID) injection 20 mg  20 mg IntraVENous BID Alek Ukiah, DO   20 mg at 01/09/22 0738    fentaNYL (SUBLIMAZE) 1,000 mcg in sodium chloride 0.9% 100 mL infusion  12.5-200 mcg/hr IntraVENous Continuous Alek Ukiah, DO 13.8 mL/hr at 01/09/22 0925 137.5 mcg/hr at 01/09/22 0925    midazolam PF (VERSED) injection 1 mg  1 mg IntraVENous Q30 Min PRN Alek Ukiah, DO   1 mg at 01/09/22 0645    warfarin (COUMADIN) daily dosing (placeholder)   Other  [address], DO Edson Garcia potassium chloride 20 mEq/50 mL IVPB (Central Line)  20 mEq IntraVENous PRN JOSSY Berger - NP 50 mL/hr at 01/09/22 1216 20 mEq at 01/09/22 1216    magnesium sulfate 1000 mg in dextrose 5% 100 mL IVPB  1,000 mg IntraVENous PRN Rhina Manjarrez APRN - NP   Stopped at 01/09/22 0851    sodium phosphate 20 mmol in dextrose 5 % 250 mL IVPB  20 mmol IntraVENous Once Nori GHOTRA MD 62.5 mL/hr at 01/09/22 0923 20 mmol at 01/09/22 0923    insulin regular (HUMULIN R;NOVOLIN R) 100 Units in sodium chloride 0.9 % 100 mL infusion  1-50 Units/hr IntraVENous Continuous Katelin Ramírez MD        ondansetron (ZOFRAN-ODT) disintegrating tablet 4 mg  4 mg Oral Q8H PRN Saratha Lyndon, DO        Or    ondansetron (ZOFRAN) injection 4 mg  4 mg IntraVENous Q6H PRN Saratha Lyndon, DO        chlorhexidine (PERIDEX) 0.12 % solution 15 mL  15 mL Mouth/Throat BID Saratha Leadore, DO   15 mL at 01/09/22 0737    propofol injection  5-50 mcg/kg/min IntraVENous Continuous Saratha Lyndon, DO 46.8 mL/hr at 01/09/22 1214 80 mcg/kg/min at 01/09/22 1214    heparin (porcine) injection 4,000 Units  4,000 Units IntraVENous PRN Saratha Leadore, DO        heparin (porcine) injection 2,000 Units  2,000 Units IntraVENous PRN Saratha Leadore, DO        heparin 25,000 units in dextrose 5% 250 mL (premix) infusion  5-30 Units/kg/hr IntraVENous Continuous Levi Manuele, DO 9.8 mL/hr at 01/09/22 0009 10 Units/kg/hr at 01/09/22 0009    DOPamine (INTROPIN) 400 mg in dextrose 5 % 250 mL infusion  2-20 mcg/kg/min IntraVENous Continuous Levi Manuele, DO 27.4 mL/hr at 01/09/22 0915 7.5 mcg/kg/min at 01/09/22 0915     Review of Systems:   · Constitutional: No Fever or Weight Loss   · Eyes: No Decreased Vision  · ENT: No Headaches, Hearing Loss or Vertigo  · Cardiovascular: No chest pain, dyspnea on exertion, palpitations or loss of consciousness  · Respiratory: No cough or wheezing    · Gastrointestinal: No abdominal pain, appetite loss, blood in stools, constipation, diarrhea or heartburn  · Genitourinary: No dysuria, trouble voiding, or hematuria  · Musculoskeletal:  No gait disturbance, weakness or joint complaints  · Integumentary: No rash or pruritis  · Neurological: No TIA or stroke symptoms  · Psychiatric: No anxiety or depression  · Endocrine: No malaise, fatigue or temperature intolerance  · Hematologic/Lymphatic: No bleeding problems, blood clots or swollen lymph nodes  · Allergic/Immunologic: No nasal congestion or hives  All systems negative except as marked. ·   ·      Physical Examination:    Vitals:    01/09/22 1221   BP: 129/62   Pulse: 61   Resp: 16   Temp: afebrile   SpO2:       Wt Readings from Last 3 Encounters:   01/08/22 215 lb (97.5 kg)   09/21/21 212 lb 9.6 oz (96.4 kg)   07/06/21 219 lb 11.2 oz (99.7 kg)     Body mass index is 33.67 kg/m². General Appearance:  Intubated and sedated    Constitutional:  Well developed, Well nourished, No acute distress, Non-toxic appearance. HENT:  Normocephalic, Atraumatic, Bilateral external ears normal, Oropharynx moist, No oral exudates, Nose normal. Neck- Normal range of motion, No tenderness, Supple, No stridor,no apical-carotid delay, no carotid bruit  Eyes:  PERRL, EOMI, Conjunctiva normal, No discharge. Respiratory:  Normal breath sounds, No respiratory distress, No wheezing, No chest tenderness. ,no use of accessory muscles, diaphragm movement is normal  Cardiovascular: (PMI) apex non displaced,no lifts no thrills, no s3,no s4, Normal heart rate, Normal rhythm, No murmurs, No rubs, No gallops. Carotid arteries pulse and amplitude are normal no bruit, no abdominal bruit noted ( normal abdominal aorta ausculation), femoral arteries pulse and amplitude are normal no bruit, pedal pulses are normal  GI:  Bowel sounds normal, Soft, No tenderness, No masses, No pulsatile masses, no hepatosplenomegally, no bruits  : External genitalia appear normal, No masses or lesions. No discharge.  No CVA tenderness. Musculoskeletal:  Intact distal pulses, No edema, No tenderness, No cyanosis, No clubbing. Good range of motion in all major joints. No tenderness to palpation or major deformities noted. Back- No tenderness. Integument:  Warm, Dry, No erythema, No rash. Skin: no rash, no ulcers  Lymphatic:  No lymphadenopathy noted. Neurologic: intubated and sedated  Lab Review   Recent Labs     01/08/22 2053   WBC 7.7   HGB 10.2*   HCT 34.1*         Recent Labs     01/09/22  1003   *   K 2.9*      CO2 16*   PHOS 1.6*   BUN 8   CREATININE 0.6     Recent Labs     01/08/22 2053   AST 41*   ALT 8*   BILITOT 0.3   ALKPHOS 65     No results for input(s): TROPONINI in the last 72 hours. No results found for: BNP  Lab Results   Component Value Date    INR 4.67 01/09/2022    PROTIME 61.2 (H) 01/09/2022         EKG:afib    Chest Xray: no acute development    ECHO: 7/6/21 Normal LVEF, mean gradient across mitral valve =10mmHg  Labs, echo, meds reviewed  Assessment: 66 y. o.year old with PMH of  has a past medical history of Afib ([address] Anticoagulated on Coumadin, Atrial fibrillation and flutter ([address] Atrioventricular godwin re-entry tachycardia ([address] H/O echocardiogram, H/O prior ablation treatment, H/O rheumatic heart disease, H/O: CVA (cerebrovascular accident), History of mitral valve prosthesis, HTN (hypertension), Hx of cardiovascular stress test, Hx of echocardiogram, Hx of echocardiogram, Hyperlipemia, Mitral valve prolapse, S/P MVR (mitral valve repair), and VHD (valvular heart disease). Recommendations:    1. Cardiac arrest:intubated, had vfib arrest, shocked at home and had amidarone drip in ED, however, had bradycardia and required dopamine, continue hypothermia protocol, will get echo  2. Afib: on coumadin, INR > 4, hold couumadin  3. Metallic mitral valve:on coumadin, INR is supra therepeutic, continue to hold it.   4. Critical care 35 mins  All labs, medications and tests reviewed, continue all other medications of all above medical condition listed as is.          John Roe MD, 1/9/2022 12:52 PM

## 2022-01-09 NOTE — PROGRESS NOTES
Updated Dr. Kacie Lam at patient bedside patient Heparin held at shift change this am at [phone]. Patient on arctic sun protocol- bleeding risk.  Understands- don't restart

## 2022-01-09 NOTE — PROGRESS NOTES
Hospitalist Progress Note      Name:  [facility] Room /Age/Sex: 1950  (66 y.o. female)   MRN & CSN:  [id number] & [id number] Admission Date/Time: 2022  8:42 PM   Location:  -A PCP: Amrit Rich MD         Hospital Day: 2    Assessment and Plan:   lawanda Mobley is a 66 y.o.  female  who presents with Cardiac arrest ([facility] [address]       1) V. fib arrest  -Had out of hospital arrest; unsure how long patient was out  -EMS was called and CPR started based on ACLS protocol  -Shocked couple of times; ROSC subsequently achieved  -Started on TTM as patient was still comatose  -IV amio stopped due to bradycardia  -Follow TTE  -Cardiology on board; Further work up when stable  -Condition is very critical; 35 miins of critical care time spent excluding procedure time.     2) Paroxysmal Valvular A Fib  -Has MVR; initially on PO coumadin but hold  -Heparin gtt on hold   -Restart AC post TTM; at high risk for embolic stroke    3) Cardiogenic Shock  -Continue dopamine due to bradycardia  -Cardiology on board    4) Electrolyte Imbalance  -Hypokalemia, Hypomagnesemia, Hypocalcemia and Hypophosphatemia  -Replace as needed    5) HAGMA  -Due to lactic acidosis  -Continue mechanical ventilation for adequate ventilation    6) Acute Hypoxic Resp Failure  -CTA Chest: No PE  -Intubated on 2022  -Continue vent support per pulmonary    7) COVID-19 gastroenteritis  -Continue symptomatic treatment      Other chronic medical conditions; medication resumed unelss contraindicated   · History of CVA  · Essential HTN  · Chronic pain  · Depression with anxiety  · Chronic constipation  · Obesity      Diet Diet NPO   DVT Prophylaxis [] Lovenox, []  Heparin, [] SCDs, [] Ambulation   GI Prophylaxis [] PPI,  [] H2 Blocker,  [] Carafate,  [] Diet/Tube Feeds   Code Status Full Code   Disposition TBD   MDM      History of Present Illness:     Patient was seen and examined  On Mechanical ventilation and sedation  RASS -5    Ten point ROS not done due to sedation    Objective: Intake/Output Summary (Last 24 hours) at 1/9/2022 1108  Last data filed at 1/9/2022 1000  Gross per 24 hour   Intake --   Output 300 ml   Net -300 ml      Vitals:   Vitals:    01/09/22 1030   BP:    Pulse: 65   Resp: 15   Temp: (!) 91.2 °F (32.9 °C)   SpO2: 100%     Physical Exam:   GEN On mechanical ventilation  HENT Mucous membranes are moist. Oral pharynx without exudates, no evidence of thrush. NECK Supple, no apparent thyromegaly or masses. RESP Clear to auscultation, no wheezes, rales or rhonchi. Symmetric chest movement while on mechanical ventilation  CARDIO/VASC S1/S2 auscultated. Regular rate without appreciable murmurs, rubs, or gallops. No JVD or carotid bruits. Peripheral pulses equal bilaterally and palpable. No peripheral edema. GI Abdomen is soft without significant tenderness, masses, or guarding. Bowel sounds are normoactive. Rectal exam deferred.  No costovertebral angle tenderness. Proctor catheter is present. HEME/LYMPH No palpable cervical lymphadenopathy and no hepatosplenomegaly. No petechiae or ecchymoses. MSK No gross joint deformities. SKIN Normal coloration, warm, dry.   NEURO RASS of -5    Medications:   Medications:    famotidine (PEPCID) injection  20 mg IntraVENous BID    warfarin (COUMADIN) daily dosing (placeholder)   Other RX Placeholder    sodium phosphate IVPB  20 mmol IntraVENous Once    chlorhexidine  15 mL Mouth/Throat BID      Infusions:    sodium chloride 100 mL/hr at 01/09/22 0532    fentaNYL 137.5 mcg/hr (01/09/22 0925)    propofol 80 mcg/kg/min (01/09/22 1001)    heparin (PORCINE) Infusion 10 Units/kg/hr (01/09/22 0009)    DOPamine 7.5 mcg/kg/min (01/09/22 0915)     PRN Meds: prochlorperazine, 10 mg, Q6H PRN  midazolam, 1 mg, Q30 Min PRN  potassium chloride, 20 mEq, PRN  magnesium sulfate, 1,000 mg, PRN  ondansetron, 4 mg, Q8H PRN   Or  ondansetron, 4 mg, Q6H PRN  heparin (porcine), 4,000 Units, PRN  heparin (porcine), 2,000 Units, PRN          Electronically signed by Nawaf Manjarrez MD on 1/9/2022 at 11:08 AM

## 2022-01-09 NOTE — PROGRESS NOTES
01/09/22 1450   Vent Information   Vent Type 980   Vent Mode AC/VC   Vt Ordered 500 mL   Rate Set 14 bmp   Peak Flow 50 L/min   Pressure Support 0 cmH20   FiO2  50 %   SpO2 100 %   SpO2/FiO2 ratio 200   Sensitivity 3   PEEP/CPAP 5   I Time/ I Time % 0 s   Vent Patient Data   High Peep/I Pressure 0   Peak Inspiratory Pressure 23 cmH2O   Mean Airway Pressure 10 cmH20   Rate Measured 15 br/min   Vt Exhaled 513 mL   Minute Volume 7.8 Liters   I:E Ratio 1:2.90   Cough/Sputum   Sputum How Obtained Endotracheal   $Obtained Sample $Induced Sputum   Cough Productive   Frequency Infrequent   Spontaneous Breathing Trial (SBT) RT Doc   Pulse 65   Additional Respiratory  Assessments   Resp 15   Position Semi-Mclaughlin's   Subglottic Suction Done?  Yes   Alarm Settings   High Pressure Alarm 44 cmH2O   High Respiratory Rate 40 br/min   22 @ LIP

## 2022-01-09 NOTE — PROGRESS NOTES
01/09/22 1145   Vent Information   Vent Type 980   Vent Mode AC/VC   Vt Ordered 500 mL   Rate Set 14 bmp   Peak Flow 50 L/min   Pressure Support 0 cmH20   FiO2  60 %   SpO2 100 %   SpO2/FiO2 ratio 166.67   Sensitivity 3   PEEP/CPAP 5   I Time/ I Time % 0 s   Vent Patient Data   High Peep/I Pressure 0   Peak Inspiratory Pressure 23 cmH2O   Mean Airway Pressure 9.7 cmH20   Rate Measured 14 br/min   Vt Exhaled 510 mL   Minute Volume 7.22 Liters   I:E Ratio 1:2.90   Cough/Sputum   Sputum How Obtained Endotracheal;Suctioned   $Obtained Sample $Induced Sputum   Cough Productive   Frequency Infrequent   Sputum Amount Small   Spontaneous Breathing Trial (SBT) RT Doc   Pulse 62   Additional Respiratory  Assessments   Resp 15   Alarm Settings   High Pressure Alarm 44 cmH2O

## 2022-01-09 NOTE — PLAN OF CARE
Problem: Airway Clearance - Ineffective  Goal: Achieve or maintain patent airway  Outcome: Ongoing     Problem: Gas Exchange - Impaired  Goal: Absence of hypoxia  Outcome: Ongoing  Goal: Promote optimal lung function  Outcome: Ongoing     Problem: Breathing Pattern - Ineffective  Goal: Ability to achieve and maintain a regular respiratory rate  Outcome: Ongoing     Problem:  Body Temperature -  Risk of, Imbalanced  Goal: Ability to maintain a body temperature within defined limits  Outcome: Ongoing  Goal: Will regain or maintain usual level of consciousness  Outcome: Ongoing  Goal: Complications related to the disease process, condition or treatment will be avoided or minimized  Outcome: Ongoing     Problem: Isolation Precautions - Risk of Spread of Infection  Goal: Prevent transmission of infection  Outcome: Ongoing     Problem: Nutrition Deficits  Goal: Optimize nutritional status  Outcome: Ongoing     Problem: Risk for Fluid Volume Deficit  Goal: Maintain normal heart rhythm  Outcome: Ongoing  Goal: Maintain absence of muscle cramping  Outcome: Ongoing  Goal: Maintain normal serum potassium, sodium, calcium, phosphorus, and pH  Outcome: Ongoing     Problem: Loneliness or Risk for Loneliness  Goal: Demonstrate positive use of time alone when socialization is not possible  Outcome: Ongoing     Problem: Fluid Volume:  Goal: Will show no signs or symptoms of fluid imbalance  Description: Will show no signs or symptoms of fluid imbalance  Outcome: Ongoing     Problem: Cardiac:  Goal: Ability to maintain an adequate cardiac output will improve  Description: Ability to maintain an adequate cardiac output will improve  Outcome: Ongoing  Goal: Complications related to the disease process, condition or treatment will be avoided or minimized  Description: Complications related to the disease process, condition or treatment will be avoided or minimized  Outcome: Ongoing  Goal: Hemodynamic stability will improve  Description: Hemodynamic stability will improve  Outcome: Ongoing  Goal: Risk factors for ineffective tissue perfusion will decrease  Description: Risk factors for ineffective tissue perfusion will decrease  Outcome: Ongoing     Problem: Patient Education: Go to Patient Education Activity  Goal: Patient/Family Education  Outcome: Ongoing

## 2022-01-09 NOTE — H&P
History and Physical      Name:  Trina Braswell /Age/Sex: 1950  (66 y.o. female)   MRN & CSN:  [id number] & [id number] Admission Date/Time: 2022  8:42 PM   Location:  [place] [address] PCP: Jeronimo Ramirez MD       Hospital Day: 2    Assessment and Plan:   Trina Braswell is a 66 y.o.  female  who presents with Cardiac arrest ([address]    V. fib arrest s/p ROSC on Arctic sun protocol  VHD s/p MVR anticoagulated on warfarin  Supratherapeutic INR  -Admit to [facility] ICU with telemetry and continuous pulse ox  -Patient had a witnessed arrest with rapid arrival of EMS  -Intubated and ROSC achieved prior to arrival  -Witnessed by . Patient slumped over and pulse was immediately lost.  History of valvular heart disease with mitral valve replacement and A. fib anticoagulated  -INR 4.12 and APTT 44.5  -Cardiology was consulted and recommended Arctic sun protocol continue to follow  -Patient was started on heparin protocol given last time patient was off of anticoagulation for short-term she suffered a CVA  -CT head without contrast: No acute intracranial abnormality  -CTA pulmonary with contrast shows no hemothorax, pneumothorax, or large area of consolidation. Rib fractures seen. No large or central pulmonary artery embolus. Dilated right atrium. Old granulomatous disease with calcified and noncalcified nodules in the upper lobe. If no prior studies get a follow-up CT once patient stabilized. For further details please see below.  -Patient was bradycardic likely secondary to amiodarone which was held in ED  -Patient was placed on propofol, fentanyl, and dopamine in the ED and we can continue these  -Arctic sun protocol  -Invasive mechanical ventilation bundle with pulmonology consult for vent management  -Pharmacy consult for heparin to eventual warfarin bridge in the setting of supratherapeutic INR and elevated APTT    Hypokalemia  -Patient's potassium on admission was 3.2.   ED repleted potassium with 20 mEq IV potassium  -Patient is on Arctic sun protocol  -Potassium will be monitored closely with repeat to be drawn with next BMP  -Potassium replacement protocol    Elevated anion gap  -Anion gap 21  -Likely secondary to lactic acidosis given above  -Pending lactic acid level via Arctic sun protocol  -We will follow with BMPs to be dictated based on these results    COVID-19 gastroenteritis  -1 week of nausea/vomiting/diarrhea  -Recommended Covid test to be completed  -Covid test came back positive  -COVID-19 lab-COVID-19 precautions    Other chronic medical conditions:   Holding all home meds except stated above or contraindicated. History of CVA  HTN  Chronic pain  Depression with anxiety  Chronic constipation  Obesity    Diet Diet NPO   DVT Prophylaxis [] Lovenox, [x]  Heparin, [] SCDs, [] Ambulation   GI Prophylaxis [] PPI,  [x] H2 Blocker,  [] Carafate,  [] Diet/Tube Feeds   Code Status Full Code   Disposition Patient requires continued admission due to Cardiac arrest ([facility] [address]   MDM [] Low, [] Moderate,[x]  High  Patient's risk as above due to acuity of condition with potential for decompensation. History of Present Illness:     Chief Complaint: Cardiac arrest ([facility] [address]    Thaddeus Wilson is a 66 y.o.  female with a reviewed and a contributory family history of heart disease and a PMH as stated above, who presents via EMS after witnessed cardiac arrest.  Patient was noted by  to slump over in her chair and he immediately called 911. He felt her pulse and did not find one. EMS arrived rather quickly as they live 3 blocks away. EMS noted patient was in V. fib arrest and was shocked and ROSC was obtained. Patient had 2 more episodes of cardiac arrest and was intubated in the field and upon arrival was in ROSC. Discussing with family and , patient has been having episodes of nausea, emesis, and nonbloody diarrhea for the past week.   Her  has been suffering with the same illness. They have not been tested for Covid. Patient had not been having any shortness of breath or cough. Prior to today's event patient had not been noted to be slurring speech, have facial droop, had unilateral numbness or weakness, dizziness, or blurred vision. Otherwise patient has been in her normal state of health up until immediately prior. No history was obtained from patient as patient was intubated. Discussed case with ED provider. ROS:   Review of Systems   Unable to perform ROS: Intubated       Objective:   No intake or output data in the 24 hours ending 01/09/22 0253   Vitals:   Vitals:    01/08/22 2331   BP:    Pulse: 62   Resp: 18   Temp:      /65   Pulse 62   Temp 97.7 °F (36.5 °C) (Core)   Resp 18   Wt 215 lb (97.5 kg)   BMI 33.67 kg/m²   Physical Exam:   Physical Exam  Vitals and nursing note reviewed. Constitutional:       General: She is in acute distress. Appearance: Normal appearance. She is ill-appearing and toxic-appearing. Interventions: She is sedated and intubated. HENT:      Head: Atraumatic. No raccoon eyes or Ji's sign. Right Ear: External ear normal.      Left Ear: External ear normal.      Nose: Nose normal. No rhinorrhea. Mouth/Throat:      Mouth: Mucous membranes are dry. Comments: Et tube in place  Eyes:      Pupils: Pupils are equal, round, and reactive to light. Cardiovascular:      Rate and Rhythm: Bradycardia present. Rhythm regularly irregular. Heart sounds: Murmur heard. No gallop. Pulmonary:      Effort: Pulmonary effort is normal. Tachypnea present. She is intubated. Breath sounds: Decreased air movement present. No wheezing, rhonchi or rales. Abdominal:      General: Abdomen is protuberant. Bowel sounds are increased. There is no distension. Palpations: Abdomen is soft. Tenderness: There is no abdominal tenderness. There is no guarding or rebound.    Musculoskeletal:         General: Normal range of motion. Cervical back: Neck supple. Right lower le+ Pitting Edema present. Left lower le+ Pitting Edema present. Skin:     General: Skin is warm and dry. Capillary Refill: Capillary refill takes less than 2 seconds. Neurological:      Comments: Unable to assess patient intubated and sedated. Bilateral pupils are equal round and reactive to light   Psychiatric:      Comments: Unable to assess         Past Medical History:      Past Medical History:   Diagnosis Date    Afib ([address]     ablation     Anticoagulated on Coumadin     **PCP follows pt's PT/INRs, along w/prescribing her Coumadin. **    Atrial fibrillation and flutter ([address] 2005    On Coumadin.  Atrioventricular godwin re-entry tachycardia ([address]     H/O echocardiogram 10/26/2006    EF 45-50%. Mod to sev septal hypokinesis. Bi-leaflet ([place]), mechanical prosthesis.  H/O prior ablation treatment 1997    Dr. Chichi Elizabeth H/O rheumatic heart disease     H/O: CVA (cerebrovascular accident)     History of mitral valve prosthesis     Metallic medtronic valve    HTN (hypertension)     Hx of cardiovascular stress test 2015    lexiscan-mild to moderate ischemia basal inferior and mid inferior,EF56%    Hx of echocardiogram 2015    EF. 45-50%. Moderate left atrial enlargement. Regional wall motion abnormality w/ probably mild reduction of LVSF. Normal sized abd aorta at 2.2cm. Mild aortic regurgitation.  Hx of echocardiogram 2021    50-55%. mechanical prosthetic valve is present with a meangradient of 2mmHg    Hyperlipemia     Mitral valve prolapse     S/P MVR (mitral valve repair) 1991    VHD (valvular heart disease) 1998     PSHX:  has a past surgical history that includes Cardiac surgery; Cardiac valuve replacement; Tubal ligation; and Mitral valve replacement (98). Allergies:    Allergies   Allergen Reactions    Adenosine     Pravachol [Pravastatin Sodium] Other (See Comments)     Muscle aches, stomach ache    Statins Other (See Comments)     Causes muscle aches. FAM HX: family history includes Heart Disease in her father. Soc HX:   Social History     Socioeconomic History    Marital status:      Spouse name: Not on file    Number of children: Not on file    Years of education: Not on file    Highest education level: Not on file   Occupational History    Not on file   Tobacco Use    Smoking status: Former Smoker    Smokeless tobacco: Never Used   Substance and Sexual Activity    Alcohol use: No     Alcohol/week: 0.0 standard drinks    Drug use: No    Sexual activity: Yes     Partners: Male     Comment:    Other Topics Concern    Not on file   Social History Narrative    Not on file     Social Determinants of Health     Financial Resource Strain:     Difficulty of Paying Living Expenses: Not on file   Food Insecurity:     Worried About Running Out of Food in the Last Year: Not on file    Roderick of Food in the Last Year: Not on file   Transportation Needs:     Lack of Transportation (Medical): Not on file    Lack of Transportation (Non-Medical):  Not on file   Physical Activity:     Days of Exercise per Week: Not on file    Minutes of Exercise per Session: Not on file   Stress:     Feeling of Stress : Not on file   Social Connections:     Frequency of Communication with Friends and Family: Not on file    Frequency of Social Gatherings with Friends and Family: Not on file    Attends Synagogue Services: Not on file    Active Member of Clubs or Organizations: Not on file    Attends Club or Organization Meetings: Not on file    Marital Status: Not on file   Intimate Partner Violence:     Fear of Current or Ex-Partner: Not on file    Emotionally Abused: Not on file    Physically Abused: Not on file    Sexually Abused: Not on file   Housing Stability:     Unable to Pay for Housing in the Last Year: Not on file    Number of Places Lived in the Last Year: Not on file    Unstable Housing in the Last Year: Not on file       Data:   CBC with Differential:    Lab Results   Component Value Date    WBC 7.7 01/08/2022    RBC 3.90 01/08/2022    HGB 10.2 01/08/2022    HCT 34.1 01/08/2022     01/08/2022    MCV 87.4 01/08/2022    MCH 26.2 01/08/2022    MCHC 29.9 01/08/2022    RDW 15.0 01/08/2022    SEGSPCT 39.4 01/08/2022    LYMPHOPCT 50.6 01/08/2022    MONOPCT 4.7 01/08/2022    BASOPCT 0.4 01/08/2022    MONOSABS 0.4 01/08/2022    LYMPHSABS 3.9 01/08/2022    EOSABS 0.0 01/08/2022    BASOSABS 0.0 01/08/2022    DIFFTYPE AUTOMATED DIFFERENTIAL 01/08/2022       CMP:     Lab Results   Component Value Date     01/08/2022    K 3.2 01/08/2022    CL 98 01/08/2022    CO2 14 01/08/2022    BUN 6 01/08/2022    CREATININE 0.8 01/08/2022    GFRAA >60 01/08/2022    LABGLOM >60 01/08/2022    GLUCOSE 237 01/08/2022    PROT 6.3 01/08/2022    PROT 7.1 10/29/2010    LABALBU 3.0 01/08/2022    CALCIUM 8.5 01/08/2022    BILITOT 0.3 01/08/2022    ALKPHOS 65 01/08/2022    AST 41 01/08/2022    ALT 8 01/08/2022       Troponin:  Lab Results   Component Value Date    TROPONINT <0.010 01/08/2022     Radiology results:  CTA PULMONARY W CONTRAST   Final Result   1. No hemothorax, pneumothorax, or large area of consolidation. There are   fractures in the anterior left 3rd through possibly 5th rib and questionably   at the right 4th and 5th rib. 2.  No large or central pulmonary arterial embolism. Respiratory motion   degrades some of the mid to distal branches without a convincing evidence for   pulmonary embolism. 3.  Dilated right atrium      4. Old granulomatous disease with calcified and noncalcified nodules in the   upper lobes. Evaluation in the lower lungs is degraded by the motion   artifact.   Comparison with any old outside studies would be beneficial.  If   no prior studies could have follow-up chest CT for full evaluation once the patient is stabilized and over the acute issues. RECOMMENDATIONS:   Unavailable         CT HEAD WO CONTRAST   Preliminary Result   No acute intracranial abnormality. Stable chronic changes described above. RECOMMENDATIONS:   Unavailable         XR CHEST PORTABLE   Final Result   1. Endotracheal tube tip is 4.6 cm above the randy. 2. Enteric tube tip side port is at the gastroesophageal junction. The tube   should be advanced. 3. Unchanged cardiomegaly without acute abnormality. Medications:   Home Medications:   Prior to Admission medications    Medication Sig Start Date End Date Taking? Authorizing Provider   metoprolol tartrate (LOPRESSOR) 25 MG tablet Take 1 tablet by mouth 2 times daily 9/21/21   Keren Juarez MD   metoprolol tartrate (LOPRESSOR) 50 MG tablet Take 1 tablet by mouth 2 times daily 9/21/21   Keren Juarez MD   amLODIPine (NORVASC) 5 MG tablet Take 1 tablet by mouth daily 9/21/21   Keren Juarez MD   HYDROcodone-acetaminophen (Rebbeca Dinning) 5-325 MG per tablet TAKE 1 TABLET BY MOUTH EVERY SIX HOURS AS NEEDED 5/2/21   Historical Provider, MD   Cetirizine HCl 10 MG CAPS Take 1 tablet by mouth daily    Historical Provider, MD   Cyanocobalamin (VITAMIN B-12) 2000 MCG TBCR Take 1 tablet by mouth daily    Historical Provider, MD   Vitamin D (CHOLECALCIFEROL) 1000 UNITS CAPS capsule Take 1,000 Units by mouth daily    Historical Provider, MD   aspirin 81 MG tablet Take 81 mg by mouth daily    Historical Provider, MD   docusate sodium (COLACE) 100 MG capsule Take 100 mg by mouth nightly    Historical Provider, MD   warfarin (COUMADIN) 2 MG tablet Take 2 mg by mouth daily Indications: **PCP followings pt's PT/INRs, along w/prescribing her Coumadin. **     Historical Provider, MD   citalopram (CELEXA) 20 MG tablet Take 20 mg by mouth daily     Historical Provider, MD     Medications:    famotidine (PEPCID) injection  20 mg IntraVENous BID    warfarin (COUMADIN) daily dosing (placeholder)   Other RX Placeholder    chlorhexidine  15 mL Mouth/Throat BID      Infusions:    sodium chloride      fentaNYL      propofol 40 mcg/kg/min (01/09/22 0113)    heparin (PORCINE) Infusion 10 Units/kg/hr (01/09/22 0009)    DOPamine 7.5 mcg/kg/min (01/09/22 0216)     PRN Meds: prochlorperazine, 10 mg, Q6H PRN  midazolam, 1 mg, Q30 Min PRN  ondansetron, 4 mg, Q8H PRN   Or  ondansetron, 4 mg, Q6H PRN  heparin (porcine), 4,000 Units, PRN  heparin (porcine), 2,000 Units, PRN      Due to the immediate potential for life-threatening deterioration due to cardiac arrest, I spent 45 minutes providing critical care. This time is excluding time spent performing procedures. Electronically signed by Marquis Reyes DO on 1/9/2022 at 2:53 AM      This dictation was created with voice recognition software. While attempts have been made to review the dictation as it is transcribed, on occasion the spoken word can be misinterpreted by the technology leading to omissions or inappropriate words, phrases or sentences.

## 2022-01-09 NOTE — PROGRESS NOTES
01/09/22 1459   Oxygen Therapy/Pulse Ox   O2 Therapy Oxygen   O2 Device Ventilator   FiO2  30 %   Resp 19   SpO2 100 %

## 2022-01-09 NOTE — ED NOTES
Patients heart rate steadily decreasing. Dr. Coleen Partick notified and at bedside. Orders received.       Cheryl Rose RN  01/08/22 9921

## 2022-01-09 NOTE — PROGRESS NOTES
Mya Figueroa  () called back and asked for patient information, this nurse asked information on verifying patient. Four digit code provided and all questions answered at this time.

## 2022-01-09 NOTE — ED PROVIDER NOTES
I saw patient exclusively for Line placement YASH Sung. In brief their history revealed SP arrest. Requiring CVC placement    My management of patient is limited exclusively to Procedure. For history, physical, management, ed course, medical decision making and disposition please see note of attending physician. Their focused exam revealed intubated/sedated elderly female lying in exam bed. Procedure Note - Central Line:  The benefits, risks, and alternatives of central venous access were discussed with the  patient and Verbal consent was obtained for the procedure. YASH Sung was prepped and draped in standard bedside fashion in the [facility] area. Physical landmarks with ultrasound guidance was used to locate the central vein. Local anesthesia with 3ml of 1% lidocaine was injected. Seldinger technique was used for placement of the CVC in a non-pulsatile vasculature. All ports aydee and flushed. The patient tolerated the procedure well and no acute complications occurred. All diagnostic, treatment, and disposition decisions were made by Attending physician    I saw this patient in conjunction with attending physician Dr. Cathy Yao.  [address] [place], [place]  01/11/22 1163

## 2022-01-09 NOTE — PROGRESS NOTES
01/09/22 0746   Vent Information   Skin Assessment Clean, dry, & intact   Equipment ID 24   Vent Type 980   Vent Mode AC/VC   Vt Ordered 500 mL   Rate Set 14 bmp   Peak Flow 50 L/min   Pressure Support 0 cmH20   FiO2  100 %   Sensitivity 3   PEEP/CPAP 5   I Time/ I Time % 0 s   Vent Patient Data   High Peep/I Pressure 0   Peak Inspiratory Pressure 23 cmH2O   Mean Airway Pressure 9.8 cmH20   Rate Measured 14 br/min   Vt Exhaled 512 mL   Minute Volume 7.3 Liters   I:E Ratio 1:2.60   Plateau Pressure 23 FNO36   Static Compliance 38 mL/cmH2O   Total PEEP 6.1 cmH20   Auto PEEP 1.5 cmH20   Cough/Sputum   Sputum How Obtained Endotracheal   $Obtained Sample $Induced Sputum   Cough Productive   Frequency Infrequent   Sputum Amount Small   Sputum Color White   Tenacity Thick   Alarm Settings   High Pressure Alarm 40 cmH2O   Delay Alarm 20 sec(s)   Low Minute Volume Alarm 20 L/min   Apnea (secs) 20 secs   High Respiratory Rate 4 br/min   Low Exhaled Vt  250 mL   Patient Observation   Observations 7.0   22@ lip

## 2022-01-10 NOTE — PROGRESS NOTES
01/10/22 1102   Vent Information   Vent Type 980   Vent Mode AC/VC   Vt Ordered 500 mL   Rate Set 14 bmp   Peak Flow 50 L/min   Pressure Support 0 cmH20   FiO2  30 %   SpO2 99 %   SpO2/FiO2 ratio 330   Sensitivity 3   PEEP/CPAP 5   I Time/ I Time % 0 s   Vent Patient Data   High Peep/I Pressure 0   Peak Inspiratory Pressure 21 cmH2O   Mean Airway Pressure 9.4 cmH20   Rate Measured 14 br/min   Vt Exhaled 505 mL   Minute Volume 7.08 Liters   I:E Ratio 1:2.90   Plateau Pressure 19 QUN17   Static Compliance 37 mL/cmH2O   Total PEEP 5 cmH20   Auto PEEP 0.1 cmH20   Spontaneous Breathing Trial (SBT) RT Doc   Pulse 78   Additional Respiratory  Assessments   Resp 14   Position Semi-Mclaughlin's   Alarm Settings   High Pressure Alarm 44 cmH2O   Delay Alarm 20 sec(s)   Low Minute Volume Alarm 2.5 L/min   Apnea (secs) 20 secs   High Respiratory Rate 40 br/min   Low Exhaled Vt  250 mL   Non-Surgical Airway 01/09/22   Placement Date/Time: 01/09/22 0733   Size: (c) 7  Placement Verified By[de-identified] Colorimetric EtCO2 device; Chest X-ray   Secured at 22 cm   Measured From Lips   Secured Location Right   Secured By Commercial tube coyne   Site Condition Cool;Dry

## 2022-01-10 NOTE — PROGRESS NOTES
Results for Cher Schaeffer (MRN [id number]) as of 1/9/2022 22:07   Ref.  Range 1/9/2022 21:00   pH, Bld Latest Ref Range: 7.34 - 7.45  7.43   Base Excess Latest Ref Range: 0 - 2.4  4 (H)   O2 Sat Latest Ref Range: 96 - 97 % 96.1   Carbon Monoxide, Blood Latest Ref Range: 0 - 5 % 1.9   CO2 Content Latest Ref Range: 19 - 24 MMOL/L 20.1   pCO2, Arterial Latest Ref Range: 32 - 45 MMHG 29.0 (L)   pO2, Arterial Latest Ref Range: 75 - 100 MMHG 100   HCO3, Arterial Latest Ref Range: 18 - 23 MMOL/L 19.2   Methemoglobin, Arterial Latest Ref Range: <1.5 % 1.3   Settings: AC/VC, 14, 500, +15, 30%

## 2022-01-10 NOTE — PROGRESS NOTES
This note also relates to the following rows which could not be included:  Vt Ordered - Cannot attach notes to unvalidated device data  Rate Set - Cannot attach notes to unvalidated device data  Peak Flow - Cannot attach notes to unvalidated device data  Pressure Support - Cannot attach notes to unvalidated device data  FiO2  - Cannot attach notes to unvalidated device data  SpO2 - Cannot attach notes to unvalidated device data  Sensitivity - Cannot attach notes to unvalidated device data  PEEP/CPAP - Cannot attach notes to unvalidated device data  I Time/ I Time % - Cannot attach notes to unvalidated device data  High Peep/I Pressure - Cannot attach notes to unvalidated device data  Peak Inspiratory Pressure - Cannot attach notes to unvalidated device data  Mean Airway Pressure - Cannot attach notes to unvalidated device data  Rate Measured - Cannot attach notes to unvalidated device data  Vt Exhaled - Cannot attach notes to unvalidated device data  Minute Volume - Cannot attach notes to unvalidated device data  I:E Ratio - Cannot attach notes to unvalidated device data  Pulse - Cannot attach notes to unvalidated device data  Resp - Cannot attach notes to unvalidated device data  High Pressure Alarm - Cannot attach notes to unvalidated device data       01/10/22 0320   Vent Information   Vent Type 980   Vent Mode AC/VC

## 2022-01-10 NOTE — PROGRESS NOTES
Hospitalist Progress Note      Name:  Johnnie Alvarez /Age/Sex: 1950  (66 y.o. female)   MRN & CSN:  [id number] & [id number] Admission Date/Time: 2022  8:42 PM   Location:  -A PCP: Lily Baer MD         Hospital Day: 3    Assessment and Plan:   Johnnie Alvarez is a 66 y.o.  female  who presents with Cardiac arrest ([facility] [address]       1) V. fib arrest  -Had out of hospital arrest; unsure how long patient was out  -EMS was called and CPR started based on ACLS protocol  -Shocked couple of times; ROSC subsequently achieved  -Started on TTM as patient was still comatose  -IV amio stopped due to bradycardia  -Follow TTE  -Cardiology on board; Further work up when stable  -Condition is very critical; 35 miins of critical care time spent excluding procedure time.     2) Paroxysmal Valvular A Fib  -Has MVR; initially on PO coumadin but hold  -Heparin gtt on hold   -Restart AC post TTM; at high risk for embolic stroke    3) Cardiogenic Shock  -Continue dopamine due to bradycardia  -Cardiology on board    4) Electrolyte Imbalance  -Hypokalemia, Hypomagnesemia, Hypocalcemia and Hypophosphatemia  -Replace as needed    5) HAGMA  -Due to lactic acidosis  -Continue mechanical ventilation for adequate ventilation    6) Acute Hypoxic Resp Failure  -CTA Chest: No PE  -Intubated on 2022  -Continue vent support per pulmonary    7) COVID-19 gastroenteritis  -Continue symptomatic treatment     8) Supra-therapeutic INR  -No evidence of bleeding and hb stable  -Hold coumadin  -Will not reverse as patient has hx of valvular A fib so high risk for embolic CVA  -Monitor closely       Other chronic medical conditions; medication resumed unelss contraindicated   · History of CVA  · Essential HTN  · Chronic pain  · Depression with anxiety  · Chronic constipation  · Obesity      Diet Diet NPO   DVT Prophylaxis [] Lovenox, []  Heparin, [] SCDs, [] Ambulation   GI Prophylaxis [] PPI,  [] H2 Blocker,  [] Carafate,  [] Diet/Tube Feeds   Code Status Full Code   Disposition TBD   MDM      History of Present Illness:     Patient was seen and examined  On Mechanical ventilation and sedation  RASS -5    Ten point ROS not done due to sedation    Objective: Intake/Output Summary (Last 24 hours) at 1/10/2022 1244  Last data filed at 1/10/2022 1200  Gross per 24 hour   Intake 727.26 ml   Output 4085 ml   Net -3357.74 ml      Vitals:   Vitals:    01/10/22 1230   BP:    Pulse: 76   Resp: 14   Temp:    SpO2: 98%     Physical Exam:   GEN On mechanical ventilation  HENT Mucous membranes are moist. Oral pharynx without exudates, no evidence of thrush. NECK Supple, no apparent thyromegaly or masses. RESP Clear to auscultation, no wheezes, rales or rhonchi. Symmetric chest movement while on mechanical ventilation  CARDIO/VASC S1/S2 auscultated. Regular rate without appreciable murmurs, rubs, or gallops. No JVD or carotid bruits. Peripheral pulses equal bilaterally and palpable. No peripheral edema. GI Abdomen is soft without significant tenderness, masses, or guarding. Bowel sounds are normoactive. Rectal exam deferred.  No costovertebral angle tenderness. Proctor catheter is present. HEME/LYMPH No palpable cervical lymphadenopathy and no hepatosplenomegaly. No petechiae or ecchymoses. MSK No gross joint deformities. SKIN Normal coloration, warm, dry.   NEURO RASS of -5    Medications:   Medications:    famotidine (PEPCID) injection  20 mg IntraVENous BID    warfarin (COUMADIN) daily dosing (placeholder)   Other RX Placeholder    chlorhexidine  15 mL Mouth/Throat BID      Infusions:    DOPamine 6.5 mcg/kg/min (01/10/22 1200)    propofol 75 mcg/kg/min (01/10/22 1200)    sodium chloride 100 mL/hr at 01/10/22 1209    fentaNYL 137.5 mcg/hr (01/10/22 1228)    insulin 1 Units/hr (01/10/22 1200)     PRN Meds: prochlorperazine, 10 mg, Q6H PRN  midazolam, 1 mg, Q30 Min PRN  potassium chloride, 20 mEq, PRN  magnesium sulfate, 1,000 mg, PRN  ondansetron, 4 mg, Q8H PRN   Or  ondansetron, 4 mg, Q6H PRN          Electronically signed by Boy Rucker MD on 1/10/2022 at 12:44 PM

## 2022-01-10 NOTE — PROGRESS NOTES
pulmonary      SUBJECTIVE:  Intubated sedated on vent     OBJECTIVE    VITALS:  BP (!) 123/53   Pulse 90   Temp 99 °F (37.2 °C) (Bladder)   Resp 12   Wt 210 lb 15.7 oz (95.7 kg)   SpO2 95%   BMI 33.04 kg/m²   HEAD AND FACE EXAM:  No throat injection, no active exudate,no thrush  NECK EXAM;No JVD, no masses, symmetrical  CHEST EXAM; Expansion equal and symmetrical, no masses  LUNG EXAM; Good breath sounds bilaterally. There are expiratory wheezes both lungs, there are crackles at both lung bases  CARDIOVASCULAR EXAM: Positive S1 and S2, no S3 or S4, no clicks ,no murmurs  RIGHT AND LEFT LOWER EXTRIMITY EXAM: No edema, no swelling, no inflamation            LABS   Lab Results   Component Value Date    WBC 9.2 01/10/2022    HGB 9.9 (L) 01/10/2022    HCT 29.0 (L) 01/10/2022    MCV 84.4 01/10/2022     01/10/2022     Lab Results   Component Value Date    CREATININE 0.8 01/10/2022    BUN 5 (L) 01/10/2022     (H) 01/10/2022    K 3.7 01/10/2022     (H) 01/10/2022    CO2 20 (L) 01/10/2022     Lab Results   Component Value Date    INR 7.26 (HH) 01/10/2022    PROTIME 95.6 (H) 01/10/2022          Lab Results   Component Value Date    PHOS 2.8 01/10/2022    PHOS 2.2 01/10/2022    PHOS 1.7 01/10/2022        Recent Labs     01/10/22  1115 01/10/22  1316 01/10/22  1534   PH 7.39 7.39 7.35   PO2ART 76.6 69.6* 65.3*   BAT0CHD 30.7* 30.9* 33.8   O2SAT 95.3* 93.8* 91.7*         Wt Readings from Last 3 Encounters:   01/10/22 210 lb 15.7 oz (95.7 kg)   09/21/21 212 lb 9.6 oz (96.4 kg)   07/06/21 219 lb 11.2 oz (99.7 kg)          EXAMINATION:   ONE XRAY VIEW OF THE CHEST       1/10/2022 9:48 am       COMPARISON:   01/08/2022       HISTORY:   Ventilator management.       FINDINGS:   Lines and Tubes: Endotracheal tube projects 6 cm from the randy.  Enteric   tube projects is through the esophagus with the tip beyond the field of view.       Mediastinum: The cardiomediastinal silhouette is enlarged and unchanged. Median sternotomy wires.       Lungs: Mild perihilar opacities and bilateral lower lung atelectasis.       Pleura: Small left pleural effusion.  No pneumothorax.       Bones and soft tissues: No acute osseous abnormality.           Impression   Stable lines and tubes.       Mild perihilar opacities and bilateral lower lung atelectasis, and small left   pleural effusion, suggest mild increased pulmonary edema.             Order History    Open Order Details             ASSESMENT  Ac resp failure  S/p CPR  covid positive        PLAN  1. cpm  2. Cont full vemnt support  3.  On hypothermia protocol    1/10/2022  Anu Bonilla MD, M.D.

## 2022-01-10 NOTE — PROGRESS NOTES
PHARMACY ANTICOAGULATION [address] [facility] is a 66 y.o. female on warfarin therapy for atrial fibrillation, mechanical mitral valve and history of CVA. Pharmacy consulted by Dr. Leola Sneed for monitoring and adjustment of treatment. Indication for anticoagulation: A-Fib, Hx of CVA, mechanical mitral valve  INR goal: 2.5-3.5  Warfarin dose prior to admission: reported 2 mg daily    Pertinent Laboratory Values   Recent Labs     01/08/22 2053 01/09/22  1003 01/10/22  0620 01/10/22  0621 01/10/22  1316   INR 4.12   < > 7.26*  --   --    HGB 10.2*   < > 10.6*   < > 9.7*   HCT 34.1*   < > 33.1*   < > 28.0*     --  155  --   --     < > = values in this interval not displayed. Assessment/Plan:     INR supra-therapeutic @ 7.26   Will hold warfarin until INR is within therapeutic range   Pharmacy will continue to monitor and adjust warfarin therapy as indicated    Thank you for the consult.   Krissy Delcid, [address] [facility]  1/10/2022 2:06 PM

## 2022-01-10 NOTE — CARE COORDINATION
Chart reviewed. Patient is a witnessed arrest at home. Patient is ventilated; TTM in progress. CM will follow for post extubation needs.  Clifton Berry RN

## 2022-01-10 NOTE — PROGRESS NOTES
Dr. Bill Bansal notified via perfect off lab results below. Informed patient to begin rewarming at [facility] and patient was diuresing > 200 mL/hr of urine. Ok to replace K, will continue to monitor. Results for Vineet Mensah (MRN [id number]) as of 1/10/2022 07:09   Ref.  Range 1/9/2022 23:30   Sodium Latest Ref Range: 138 - 146 MMOL/L 148 (H)   Potassium Latest Ref Range: 3.5 - 4.5 MMOL/L 2.3 (LL)   POC Glucose Latest Ref Range: 70 - 99 MG/ (H)   POC Chloride Latest Ref Range: 98 - 109 MMOL/L 117 (H)   POC Creatinine Latest Ref Range: 0.6 - 1.1 MG/DL 0.7   POC CALCIUM Latest Ref Range: 1.12 - 1.32 MMOL/L 1.18

## 2022-01-10 NOTE — FLOWSHEET NOTE
Titrating sedation off per TTM protocol for a RASS of -1. Pt intermittently with eyes open and having some light jerking movements with her whole body, also doing a chewing motion with her mouth. Pt not following commands or doing anything purposeful. Made Dr. Dedrick Fierro aware. States to see how pt does with all sedation off. RN to continue to titrate down propofol and fentanyl and continue to monitor.

## 2022-01-10 NOTE — FLOWSHEET NOTE
Pt continuing with random eye opening, chewing motion and jerking movements. Made Dr. Quang Saenz aware. Orders to restart sedation and head CT. Pt to and from head CT without incident, will continue to titrate sedation for pt comfort. RN to continue to monitor.

## 2022-01-11 NOTE — PROGRESS NOTES
PHARMACY ANTICOAGULATION [address] [facility] is a 66 y.o. female on warfarin therapy for atrial fibrillation, mechanical mitral valve and history of CVA. Pharmacy consulted by Dr. Joann Curling for monitoring and adjustment of treatment. Indication for anticoagulation: A-Fib, Hx of CVA, mechanical mitral valve  INR goal: 2.5-3.5  Warfarin dose prior to admission: reported 2 mg daily    Pertinent Laboratory Values   Recent Labs     01/08/22 2053 01/09/22  1003 01/10/22  0620 01/10/22  0621 01/11/22  0450   INR 4.12   < > 7.26*   < > 7.38*   HGB 10.2*   < > 10.6*   < > 8.7*   HCT 34.1*   < > 33.1*   < > 28.4*     --  155  --  132*    < > = values in this interval not displayed. Assessment/Plan:     INR supra-therapeutic @ 7.38, slight increase in trends   INR has been elevated since admission   No warfarin doses have been given since 01/08, MD notes no evidence of bleeding.    Continue to hold warfarin until INR closer to goal range   Pharmacy will continue to monitor and adjust warfarin therapy as indicated    Thank you for the consult,  Josesito Waller, [address] [facility]  1/11/2022 3:28 PM

## 2022-01-11 NOTE — PROGRESS NOTES
Today's plan:  Rewarming as per protocol, ECHO is normal LVEF    Admit Date:  1/8/2022    Subjective:INTUBATED      Chief complaints on admission  Chief Complaint   Patient presents with    Cardiac Arrest         History of present illness:Celina is a 66 y. o.year old who  presents with had concerns including Cardiac Arrest.      Past medical history:    has a past medical history of Afib ([address] Anticoagulated on Coumadin, Atrial fibrillation and flutter ([address] Atrioventricular godwin re-entry tachycardia ([address] H/O echocardiogram, H/O prior ablation treatment, H/O rheumatic heart disease, H/O: CVA (cerebrovascular accident), History of mitral valve prosthesis, HTN (hypertension), Hx of cardiovascular stress test, Hx of echocardiogram, Hx of echocardiogram, Hyperlipemia, Mitral valve prolapse, S/P MVR (mitral valve repair), and VHD (valvular heart disease). Past surgical history:   has a past surgical history that includes Cardiac surgery; Cardiac valuve replacement; Tubal ligation; and Mitral valve replacement (2/11/98). Social History:   reports that she has quit smoking. She has never used smokeless tobacco. She reports that she does not drink alcohol and does not use drugs. Family history:  family history includes Heart Disease in her father. Allergies   Allergen Reactions    Adenosine     Pravachol [Pravastatin Sodium] Other (See Comments)     Muscle aches, stomach ache    Statins Other (See Comments)     Causes muscle aches. Objective:   BP (!) 137/57   Pulse 83   Temp 98.6 °F (37 °C) (Core)   Resp 15   Ht 5' 7\" (1.702 m)   Wt 210 lb 15.7 oz (95.7 kg)   SpO2 96%   BMI 33.04 kg/m²       Intake/Output Summary (Last 24 hours) at 1/11/2022 1607  Last data filed at 1/11/2022 3391  Gross per 24 hour   Intake 732.01 ml   Output 205 ml   Net 527.01 ml     Physical Exam:  Constitutional:  Well developed, Well nourished, No acute distress, Non-toxic appearance.    HENT:  Normocephalic, Atraumatic, Bilateral external ears normal, Oropharynx moist, No oral exudates, Nose normal. Neck- Normal range of motion, No tenderness, Supple, No stridor. Eyes:  PERRL, EOMI, Conjunctiva normal, No discharge. Respiratory:  Normal breath sounds, No respiratory distress, No wheezing, No chest tenderness. ,no use of accessory muscles, diaphragm movement is normal  Cardiovascular: (PMI) apex non displaced,no lifts no thrills, no s3,no s4, Normal heart rate, Normal rhythm, No murmurs, No rubs, No gallops. Carotid arteries pulse and amplitude are normal no bruit, no abdominal bruit noted ( normal abdominal aorta ausculation), femoral arteries pulse and amplitude are normal no bruit, pedal pulses are normal  GI:  Bowel sounds normal, Soft, No tenderness, No masses, No pulsatile masses. : External genitalia appear normal, No masses or lesions. No discharge. No CVA tenderness. Musculoskeletal:  Intact distal pulses, No edema, No tenderness, No cyanosis, No clubbing. Good range of motion in all major joints. No tenderness to palpation or major deformities noted. Back- No tenderness. Integument:  Warm, Dry, No erythema, No rash. Lymphatic:  No lymphadenopathy noted. Neurologic:  INTUBATED and sedated.      Medications:    valproate sodium (DEPACON) IVPB  1,500 mg IntraVENous Once    [START ON 1/12/2022] valproate sodium (DEPACON) IVPB  500 mg IntraVENous Q8H    insulin lispro  0-6 Units SubCUTAneous Q4H    famotidine (PEPCID) injection  20 mg IntraVENous BID    warfarin (COUMADIN) daily dosing (placeholder)   Other RX Placeholder    chlorhexidine  15 mL Mouth/Throat BID      vasopressin (Septic Shock) infusion 0.03 Units/min (01/11/22 1523)    DOPamine Stopped (01/10/22 1848)    propofol 63 mcg/kg/min (01/11/22 1518)    sodium chloride 100 mL/hr at 01/11/22 1007    fentaNYL 63 mcg/hr (01/11/22 0253)     polyvinyl alcohol **AND** lubrifresh P.M., prochlorperazine, midazolam, potassium chloride, magnesium sulfate, ondansetron **OR** ondansetron    Lab Data:  CBC:   Recent Labs     01/08/22  2053 01/10/22  0208 01/10/22  0620 01/10/22  0621 01/10/22  1534 01/10/22  1833 01/11/22  0450   WBC 7.7  --  9.2  --   --   --  9.8   HGB 10.2*   < > 10.6*   < > 9.9* 10.3* 8.7*   HCT 34.1*   < > 33.1*   < > 29.0* 30.0* 28.4*   MCV 87.4  --  84.4  --   --   --  87.1     --  155  --   --   --  132*    < > = values in this interval not displayed. BMP:   Recent Labs     01/10/22  0620 01/10/22  0621 01/10/22  1220 01/10/22  1316 01/10/22  1534 01/10/22  1833 01/11/22  0450      < > 145   < > 147* 147* 143   K 3.2*  3.4*   < > 3.5   < > 3.7 3.9 4.2   *  --  116*  --   --   --  115*   CO2 18*   < > 18*   < > 20* 18* 17*   PHOS 2.2*  --  2.8  --   --   --  5.2*   BUN 4*  --  5*  --   --   --  9   CREATININE 0.5*   < > 0.5*   < > 0.8 0.9 1.3*    < > = values in this interval not displayed. LIVER PROFILE:   Recent Labs     01/08/22 2053   AST 41*   ALT 8*   BILITOT 0.3   ALKPHOS 65     PT/INR:   Recent Labs     01/09/22 1003 01/10/22  0620 01/11/22  0450   PROTIME 61.2* 95.6* 97.2*   INR 4.67 7.26* 7.38*     APTT:   Recent Labs     01/08/22 2053 01/09/22  0341 01/09/22  1003   APTT 44.5* >240.0* 77.1*     BNP:  No results for input(s): BNP in the last 72 hours. TROPONIN: @TROPONINI:3@      Assessment:  66 y. o.year old who is admitted for          Plan:    1. Cardiac arrest:intubated, had vfib arrest, shocked at home and had amidarone drip in ED, however, had bradycardia and required dopamine, rewarming now hypothermia protocol,  Echo lVEF is normal  2. Afib: on coumadin, INR > 7, hold couumadin  3. Metallic mitral valve:on coumadin, INR is supra therepeutic, continue to hold it. 4. critial care 35 mins  All labs, medications and tests reviewed, continue all other medications of all above medical condition listed as is.       Judith Perez MD, MD 1/11/2022 4:07 PM

## 2022-01-11 NOTE — CONSULTS
Comprehensive Nutrition Assessment    Type and Reason for Visit:  Initial,Consult    Nutrition Recommendations/Plan:   · EN Order: peptide based, high protein at 25 mL/hr    Nutrition Assessment:  RD consulted for EN order and manage. Pt has been on arctic sun protocol. She is now warmed and hemodynamically stable. Will order EN    Malnutrition Assessment:  Malnutrition Status: At risk for malnutrition (Comment)    Context:  Acute Illness       Estimated Daily Nutrient Needs:  Energy (kcal):  1556; Weight Used for Energy Requirements:  Current     Protein (g):  92; Weight Used for Protein Requirements:  Ideal        Fluid (ml/day):  1556; Method Used for Fluid Requirements:  1 ml/kcal      Wounds:  None       Current Nutrition Therapies:    Diet NPO    Anthropometric Measures:  · Height: 5' 7\" (170.2 cm)  · Current Body Weight: 210 lb (95.3 kg)   · Admission Body Weight: 210 lb (95.3 kg)    · Usual Body Weight: 212 lb (96.2 kg)     · Ideal Body Weight: 135 lbs; % Ideal Body Weight 155.6 %   · BMI: 32.9  · BMI Categories: Obese Class 1 (BMI 30.0-34. 9)       Nutrition Diagnosis:   · Inadequate oral intake related to impaired respiratory function as evidenced by NPO or clear liquid status due to medical condition,intubation      Nutrition Interventions:   Food and/or Nutrient Delivery:  Start Tube Feeding  Nutrition Education/Counseling:  No recommendation at this time   Coordination of Nutrition Care:  Continue to monitor while inpatient    Goals:  pt will tolerate the start of EN       Nutrition Monitoring and Evaluation:   Behavioral-Environmental Outcomes:  None Identified   Food/Nutrient Intake Outcomes:  Enteral Nutrition Intake/Tolerance  Physical Signs/Symptoms Outcomes:  Biochemical Data,Skin,Weight,Hemodynamic Status     Discharge Planning:     Too soon to determine     Electronically signed by Aniceto Lockwood RD, LD on 4/89/86 at 12:16 PM EST    Contact: [phone]

## 2022-01-11 NOTE — PLAN OF CARE
Problem: Airway Clearance - Ineffective  Goal: Achieve or maintain patent airway  1/11/2022 0221 by Alessandro Stroud RN  Outcome: Ongoing  1/11/2022 0215 by Alessandro Stroud RN  Outcome: Ongoing     Problem: Gas Exchange - Impaired  Goal: Absence of hypoxia  1/11/2022 0221 by Alessandro Stroud RN  Outcome: Ongoing  1/11/2022 0215 by Alessandro Stroud RN  Outcome: Ongoing  Goal: Promote optimal lung function  1/11/2022 0221 by Alessandro Stroud RN  Outcome: Ongoing  1/11/2022 0215 by Alessandro Stroud RN  Outcome: Ongoing     Problem: Breathing Pattern - Ineffective  Goal: Ability to achieve and maintain a regular respiratory rate  1/11/2022 0221 by Alessandro Stroud RN  Outcome: Ongoing  1/11/2022 0215 by Alessandro Stroud RN  Outcome: Ongoing     Problem:  Body Temperature -  Risk of, Imbalanced  Goal: Ability to maintain a body temperature within defined limits  1/11/2022 0221 by Alessandro Stroud RN  Outcome: Ongoing  1/11/2022 0215 by Alessandro Stroud RN  Outcome: Ongoing  Goal: Will regain or maintain usual level of consciousness  1/11/2022 0221 by Alessandro Stroud RN  Outcome: Ongoing  1/11/2022 0215 by Alessandro Stroud RN  Outcome: Ongoing  Goal: Complications related to the disease process, condition or treatment will be avoided or minimized  1/11/2022 0221 by Alessandro Stroud RN  Outcome: Ongoing  1/11/2022 0215 by Alessandro Stroud RN  Outcome: Ongoing     Problem: Isolation Precautions - Risk of Spread of Infection  Goal: Prevent transmission of infection  1/11/2022 0221 by Alessandro Stroud RN  Outcome: Ongoing  1/11/2022 0215 by Alessandro Stroud RN  Outcome: Ongoing     Problem: Nutrition Deficits  Goal: Optimize nutritional status  1/11/2022 0221 by Alessandro Stroud RN  Outcome: Ongoing  1/11/2022 0215 by Alessandro Stroud RN  Outcome: Ongoing     Problem: Risk for Fluid Volume Deficit  Goal: Maintain normal heart rhythm  1/11/2022 0221 by Rodolfo Alas Risk factors for ineffective tissue perfusion will decrease  Description: Risk factors for ineffective tissue perfusion will decrease  1/11/2022 0221 by Rogelio Cai RN  Outcome: Ongoing  1/11/2022 0215 by Rogelio Cai RN  Outcome: Ongoing     Problem: Fluid Volume:  Goal: Will show no signs or symptoms of fluid imbalance  Description: Will show no signs or symptoms of fluid imbalance  1/11/2022 0221 by Rogelio Cai RN  Outcome: Ongoing  1/11/2022 0215 by Rogelio Cai RN  Outcome: Ongoing     Problem: Skin Integrity:  Goal: Will show no infection signs and symptoms  Description: Will show no infection signs and symptoms  1/11/2022 0221 by Rogelio Cai RN  Outcome: Ongoing  1/11/2022 0215 by Rogelio Cai RN  Outcome: Ongoing  Goal: Absence of new skin breakdown  Description: Absence of new skin breakdown  1/11/2022 0221 by Rogelio Cai RN  Outcome: Ongoing  1/11/2022 0215 by Rogelio Cai RN  Outcome: Ongoing     Problem: Falls - Risk of:  Goal: Will remain free from falls  Description: Will remain free from falls  1/11/2022 0221 by Rogelio Cai RN  Outcome: Ongoing  1/11/2022 0215 by Rogelio Cai RN  Outcome: Ongoing  Goal: Absence of physical injury  Description: Absence of physical injury  1/11/2022 0221 by Rogelio Cai RN  Outcome: Ongoing  1/11/2022 0215 by Rogelio Cai RN  Outcome: Ongoing     Problem: Skin Integrity:  Goal: Will show no infection signs and symptoms  Description: Will show no infection signs and symptoms  1/11/2022 0221 by Rogelio Cai RN  Outcome: Ongoing  1/11/2022 0215 by Rogelio Cai RN  Outcome: Ongoing  Goal: Absence of new skin breakdown  Description: Absence of new skin breakdown  1/11/2022 0221 by Rogelio Cai RN  Outcome: Ongoing  1/11/2022 0215 by Rogelio Cai RN  Outcome: Ongoing

## 2022-01-11 NOTE — FLOWSHEET NOTE
Daughter Wally Mejia calling in for update on patient. Informed her that Neurology will be consulted in the coming days. She requests that  NOT be on the case.

## 2022-01-11 NOTE — PROGRESS NOTES
Hospitalist Progress Note      Name:  Uriel Mathews /Age/Sex: 1950  (66 y.o. female)   MRN & CSN:  [id number] & [id number] Admission Date/Time: 2022  8:42 PM   Location:  -A PCP: Vessie Riedel, MD         Hospital Day: 4    Assessment and Plan:   Uriel Mathews is a 66 y.o.  female  who presents with Cardiac arrest ([facility] [address]       1) V. fib arrest  -Had out of hospital arrest; unsure how long patient was out  -EMS was called and CPR started based on ACLS protocol  -Shocked couple of times; ROSC subsequently achieved  -Started on TTM as patient was still comatose  -IV amio stopped due to bradycardia  -Follow TTE  -Cardiology on board; Further work up when stable  -Condition is very critical; 35 miins of critical care time spent excluding procedure time.     2) Paroxysmal Valvular A Fib  -Has MVR; initially on PO coumadin but hold  -Heparin gtt on hold   -Restart AC post TTM; at high risk for embolic stroke    3) Cardiogenic Shock  -Continue dopamine due to bradycardia  -Cardiology on board    4) Electrolyte Imbalance  -Hypokalemia, Hypomagnesemia, Hypocalcemia and Hypophosphatemia  -Replace as needed    5) HAGMA  -Due to lactic acidosis  -Continue mechanical ventilation for adequate ventilation    6) Acute Hypoxic Resp Failure  -CTA Chest: No PE  -Intubated on 2022  -Continue vent support per pulmonary    7) COVID-19 gastroenteritis  -Continue symptomatic treatment     8) Supra-therapeutic INR  -No evidence of bleeding and hb stable  -Hold coumadin  -Will not reverse as patient has hx of valvular A fib so high risk for embolic CVA  -Monitor closely    9) Possible Anoxic brain injury  -CT head non acute  -unsure of the actual downtime  -Patient has occasional myoclonic jerks when sedation was turned down  -Neurology consulted to follow       Other chronic medical conditions; medication resumed unelss contraindicated   · History of CVA  · Essential HTN  · Chronic pain  · Depression with anxiety  · Chronic constipation  · Obesity      Diet Diet NPO   DVT Prophylaxis [] Lovenox, []  Heparin, [] SCDs, [] Ambulation   GI Prophylaxis [] PPI,  [] H2 Blocker,  [] Carafate,  [] Diet/Tube Feeds   Code Status Full Code   Disposition TBD   MDM      History of Present Illness:     Patient was seen and examined  On Mechanical ventilation and sedation  RASS -5    Ten point ROS not done due to sedation    Objective: Intake/Output Summary (Last 24 hours) at 1/11/2022 1048  Last data filed at 1/11/2022 0648  Gross per 24 hour   Intake 1765.64 ml   Output 420 ml   Net 1345.64 ml      Vitals:   Vitals:    01/11/22 1045   BP:    Pulse: 82   Resp: 14   Temp:    SpO2: 100%     Physical Exam:   GEN On mechanical ventilation  HENT Mucous membranes are moist. Oral pharynx without exudates, no evidence of thrush. NECK Supple, no apparent thyromegaly or masses. RESP Clear to auscultation, no wheezes, rales or rhonchi. Symmetric chest movement while on mechanical ventilation  CARDIO/VASC S1/S2 auscultated. Regular rate without appreciable murmurs, rubs, or gallops. No JVD or carotid bruits. Peripheral pulses equal bilaterally and palpable. No peripheral edema. GI Abdomen is soft without significant tenderness, masses, or guarding. Bowel sounds are normoactive. Rectal exam deferred.  No costovertebral angle tenderness. Proctor catheter is present. HEME/LYMPH No palpable cervical lymphadenopathy and no hepatosplenomegaly. No petechiae or ecchymoses. MSK No gross joint deformities. SKIN Normal coloration, warm, dry.   NEURO RASS of -5    Medications:   Medications:    insulin lispro  0-6 Units SubCUTAneous Q4H    famotidine (PEPCID) injection  20 mg IntraVENous BID    warfarin (COUMADIN) daily dosing (placeholder)   Other RX Placeholder    chlorhexidine  15 mL Mouth/Throat BID      Infusions:    DOPamine Stopped (01/10/22 8155)    propofol 63 mcg/kg/min (01/11/22 1008)    sodium chloride 100 mL/hr at 01/11/22 1007    fentaNYL 63 mcg/hr (01/11/22 0253)     PRN Meds: polyvinyl alcohol, 1 drop, Q2H PRN   And  lubrifresh P.M., , Q2H PRN  prochlorperazine, 10 mg, Q6H PRN  midazolam, 1 mg, Q30 Min PRN  potassium chloride, 20 mEq, PRN  magnesium sulfate, 1,000 mg, PRN  ondansetron, 4 mg, Q8H PRN   Or  ondansetron, 4 mg, Q6H PRN          Electronically signed by Juan Brice MD on 1/11/2022 at 10:48 AM

## 2022-01-11 NOTE — PROGRESS NOTES
Progress Note( Dr. Nkechi Cutler)  1/10/2022  Subjective:   Admit Date: 1/8/2022  PCP: Lola Hashimoto, MD    Admitted For :Cardiac arrest    Consulted For: Better control of blood glucose    Interval History: Patient has been vomiting enough this time Arctic pump possibly be discontinued    Patient is sedated intubated and on ventilator      Intake/Output Summary (Last 24 hours) at 1/10/2022 2210  Last data filed at 1/10/2022 1847  Gross per 24 hour   Intake 1724.54 ml   Output 1810 ml   Net -85.46 ml       DATA    CBC:   Recent Labs     01/08/22  2053 01/10/22  0208 01/10/22  0620 01/10/22  0621 01/10/22  1316 01/10/22  1534 01/10/22  1833   WBC 7.7  --  9.2  --   --   --   --    HGB 10.2*   < > 10.6*   < > 9.7* 9.9* 10.3*     --  155  --   --   --   --     < > = values in this interval not displayed. CMP:  Recent Labs     01/08/22 2053 01/09/22  0341 01/10/22  0206 01/10/22  0208 01/10/22  0620 01/10/22  0621 01/10/22  1220 01/10/22  1220 01/10/22  1316 01/10/22  1534 01/10/22  1833   *   < > 145   < > 145   < > 145   < > 147* 147* 147*   K 3.2*   < > 2.9*   < > 3.2*  3.4*   < > 3.5   < > 3.4* 3.7 3.9   CL 98*   < > 114*  --  115*  --  116*  --   --   --   --    CO2 14*   < > 17*   < > 18*   < > 18*   < > 20* 20* 18*   BUN 6   < > 5*  --  4*  --  5*  --   --   --   --    CREATININE 0.8   < > 0.5*   < > 0.5*   < > 0.5*   < > 0.9 0.8 0.9   CALCIUM 8.5   < > 8.1*  --  7.8*  --  7.8*  --   --   --   --    PROT 6.3*  --   --   --   --   --   --   --   --   --   --    LABALBU 3.0*  --   --   --   --   --   --   --   --   --   --    BILITOT 0.3  --   --   --   --   --   --   --   --   --   --    ALKPHOS 65  --   --   --   --   --   --   --   --   --   --    AST 41*  --   --   --   --   --   --   --   --   --   --    ALT 8*  --   --   --   --   --   --   --   --   --   --     < > = values in this interval not displayed.      Lipids:   Lab Results   Component Value Date    CHOL 172 07/05/2021    HDL 51 07/05/2021    TRIG 109 07/05/2021     Glucose:  Recent Labs     01/10/22  1316 01/10/22  1534 01/10/22  1833   POCGLU 111* 103* 123*     [id number]:  Lab Results   Component Value Date    LABA1C 6.4 07/05/2021     High Sensitivity TSH:   Lab Results   Component Value Date    TSHHS 1.107 10/29/2010     Free T3:   Lab Results   Component Value Date    FT3 2.3 10/29/2010     Free T4:  Lab Results   Component Value Date    T4FREE 1.04 10/29/2010       CT HEAD WO CONTRAST   Final Result   1. No acute intracranial abnormality. 2. Stable bilateral cerebellar and right frontal encephalomalacia in keeping   with sequela of prior infarcts. XR CHEST PORTABLE   Final Result   Stable lines and tubes. Mild perihilar opacities and bilateral lower lung atelectasis, and small left   pleural effusion, suggest mild increased pulmonary edema. XR ABDOMEN (KUB) (SINGLE AP VIEW)   Final Result   OG tip in region of gastric antrum and side port in body of stomach         CTA PULMONARY W CONTRAST   Final Result   1. No hemothorax, pneumothorax, or large area of consolidation. There are   fractures in the anterior left 3rd through possibly 5th rib and questionably   at the right 4th and 5th rib. 2.  No large or central pulmonary arterial embolism. Respiratory motion   degrades some of the mid to distal branches without a convincing evidence for   pulmonary embolism. 3.  Dilated right atrium      4. Old granulomatous disease with calcified and noncalcified nodules in the   upper lobes. Evaluation in the lower lungs is degraded by the motion   artifact. Comparison with any old outside studies would be beneficial.  If   no prior studies could have follow-up chest CT for full evaluation once the   patient is stabilized and over the acute issues. RECOMMENDATIONS:   Unavailable         CT HEAD WO CONTRAST   Final Result   No acute intracranial abnormality. Stable chronic changes described above. RECOMMENDATIONS:   Unavailable         XR CHEST PORTABLE   Final Result   1. Endotracheal tube tip is 4.6 cm above the randy. 2. Enteric tube tip side port is at the gastroesophageal junction. The tube   should be advanced. 3. Unchanged cardiomegaly without acute abnormality. XR CHEST PORTABLE    (Results Pending)        Scheduled Medicines   Medications:    insulin lispro  0-6 Units SubCUTAneous Q4H    famotidine (PEPCID) injection  20 mg IntraVENous BID    warfarin (COUMADIN) daily dosing (placeholder)   Other RX Placeholder    chlorhexidine  15 mL Mouth/Throat BID      Infusions:    DOPamine Stopped (01/10/22 1848)    propofol 40 mcg/kg/min (01/10/22 1847)    sodium chloride 100 mL/hr at 01/10/22 1847    fentaNYL 62.5 mcg/hr (01/10/22 1847)         Objective:   Vitals: BP (!) 137/57   Pulse 85   Temp 98.2 °F (36.8 °C) (Bladder)   Resp 16   Wt 210 lb 15.7 oz (95.7 kg)   SpO2 100%   BMI 33.04 kg/m²   General appearance: Patient is sedated intubated and on ventilator  Neck: no JVD or bruit  Thyroid : Normal lobes   Lungs: Has Vesicular Breath sounds   Heart:  regular rate and rhythm  Abdomen: soft, non-tender; bowel sounds normal; no masses,  no organomegaly  Musculoskeletal: Normal  Extremities: extremities normal, , no edema  Neurologic: Patient is sedated intubated and on ventilator  Assessment:     Patient Active Problem List:     Mitral valve prolapse     Afib (HCC)     H/O prior ablation treatment     VHD (valvular heart disease)     History of mitral valve prosthesis     Abnormal nuclear stress test     Atrioventricular godwin re-entry tachycardia (HCC)     H/O: CVA (cerebrovascular accident)     Anticoagulated on Coumadin     Atrial fibrillation and flutter (HCC)     Acute CVA (cerebrovascular accident) [facility])     Essential hypertension     Cardiac arrest ([facility] [address]      Plan:     1. Reviewed POC blood glucose . Labs and X ray results   2. Reviewed Current Medicines   3.  We will discontinue IV insulin infusion drip  4. Will start on Correction bolus Humalog/ Insulin regime   5. Monitor Blood glucose frequently   6. Modified  the dose of Insulin/ other medicines as needed   7. Will follow     .      Micheline Sky MD, MD

## 2022-01-11 NOTE — PROGRESS NOTES
pulmonary      SUBJECTIVE: intubated on vent     OBJECTIVE    VITALS:  BP (!) 137/57   Pulse 82   Temp 98.4 °F (36.9 °C) (Core)   Resp 14   Wt 210 lb 15.7 oz (95.7 kg)   SpO2 100%   BMI 33.04 kg/m²   HEAD AND FACE EXAM:  No throat injection, no active exudate,no thrush  NECK EXAM;No JVD, no masses, symmetrical  CHEST EXAM; Expansion equal and symmetrical, no masses  LUNG EXAM; Good breath sounds bilaterally.  There are expiratory wheezes both lungs, there are crackles at both lung bases  CARDIOVASCULAR EXAM: Positive S1 and S2, no S3 or S4, no clicks ,no murmurs  RIGHT AND LEFT LOWER EXTRIMITY EXAM: No edema, no swelling, no inflamation            LABS   Lab Results   Component Value Date    WBC 9.8 01/11/2022    HGB 8.7 (L) 01/11/2022    HCT 28.4 (L) 01/11/2022    MCV 87.1 01/11/2022     (L) 01/11/2022     Lab Results   Component Value Date    CREATININE 1.3 (H) 01/11/2022    BUN 9 01/11/2022     01/11/2022    K 4.2 01/11/2022     (H) 01/11/2022    CO2 17 (L) 01/11/2022     Lab Results   Component Value Date    INR 7.38 (HH) 01/11/2022    PROTIME 97.2 (H) 01/11/2022          Lab Results   Component Value Date    PHOS 5.2 01/11/2022    PHOS 2.8 01/10/2022    PHOS 2.2 01/10/2022        Recent Labs     01/10/22  1534 01/10/22  1833 01/11/22  0600   PH 7.35 7.30* 7.26*   PO2ART 65.3* 87.2 94   [id number] 33.8 34.4 36.0   O2SAT 91.7* 95.7* 96.1         Wt Readings from Last 3 Encounters:   01/10/22 210 lb 15.7 oz (95.7 kg)   09/21/21 212 lb 9.6 oz (96.4 kg)   07/06/21 219 lb 11.2 oz (99.7 kg)          EXAMINATION:   ONE XRAY VIEW OF THE CHEST       1/11/2022 6:12 am       COMPARISON:   01/10/2022 and 01/08/2022       HISTORY:   ORDERING SYSTEM PROVIDED HISTORY: ventilator management   TECHNOLOGIST PROVIDED HISTORY:   Reason for exam:->ventilator management   Reason for Exam: ventilator management       FINDINGS:   Endotracheal tube tip is approximately 6 cm above the randy.  Nasogastric   tube courses into the stomach and off the exam.  Wires and clips over the   midline chest.  No central venous lines are seen.       Bibasilar opacities are again noted with obscuration of the left   hemidiaphragm.  Hazy perihilar opacities appear more prominent at the right   side.  No pneumothorax is demonstrated.  The cardiac silhouette is partially   obscured but likely enlarged.           Impression   Endotracheal tube and nasogastric tube appear acceptable.       Bibasilar opacities and some increase in right perihilar opacities.  Could   relate to increasing perihilar edema with left pleural effusion and   atelectasis.  However underlying pneumonia is not excluded.             Order History    Open Order Details             ASSESMENT  Ac resp failure  S/p CPR  covid positive  Anoxic encephalopathy        PLAN  1. cpm  2. Cont full vent support  3.  Will wean when overall condition improves    1/11/2022  Sharmila Bahena MD, M.D.

## 2022-01-11 NOTE — CONSULTS
Neurology Service Consult Note  [facility]  Patient Name: Bryce Room  : 1950        Subjective:   Reason for consult: \"Assessing for anoxic injury\"  Patient seen and examined. Chart reviewed in detail. 66 y.o. -female with PMH  Afib with ablation now on [address], HTN, VHD, mitral valve prolapse presenting to [facility] status post cardiac arrest, found to be positive for COVID-19 infection at presentation. Per chart review patient was found apneic and pulseless per EMS. Patient's  reported that she was sitting in a chair slumped over and not breathing, without pulse. Unknown how long she was pulseless and CPR was not started prior to EMS arrival.  Patient did regain ROSC after being shocked secondary to V. fib, was intubated and administered medications prior to arrival. Patient was started on the Arctic sun protocol upon arrival, however patient is normotensive at this time. Neurology being consulted for possible anoxic brain injury, secondary to cardiac arrest with unknown downtime. Patient also has myoclonic jerks when sedation is paused. Prior work-up includes but not limited to CBC, CMP MP, ABG, CT of head showing chronic bilateral cerebellar and right frontal encephalomalacia    Patient remains on sedation, recommend this being decreased as this limits are neurological exam.  Patient does not withdraw to pain, follows commands. Patient has a weak cough, absent gag, fixed pupils, no corneal reflex, consistent fine twitching to bilateral eyelids and bilateral lower extremities. Past Medical History:   Diagnosis Date    Afib [facility])     ablation     Anticoagulated on Coumadin     **PCP follows pt's PT/INRs, along w/prescribing her Coumadin. **    Atrial fibrillation and flutter ([address] 2005    On Coumadin.  Atrioventricular godwin re-entry tachycardia ([address]     H/O echocardiogram 10/26/2006    EF 45-50%. Mod to sev septal hypokinesis. Bi-leaflet ([employer]), mechanical prosthesis.  H/O prior ablation treatment 01/01/1997    Dr. Bryanna Caba H/O rheumatic heart disease     H/O: CVA (cerebrovascular accident)     History of mitral valve prosthesis     Metallic medtronic valve    HTN (hypertension)     Hx of cardiovascular stress test 09/30/2015    lexiscan-mild to moderate ischemia basal inferior and mid inferior,EF56%    Hx of echocardiogram 09/30/2015    EF. 45-50%. Moderate left atrial enlargement. Regional wall motion abnormality w/ probably mild reduction of LVSF. Normal sized abd aorta at 2.2cm. Mild aortic regurgitation.  Hx of echocardiogram 06/22/2021    50-55%. mechanical prosthetic valve is present with a meangradient of 2mmHg    Hyperlipemia     Mitral valve prolapse     S/P MVR (mitral valve repair) 12/23/1991    VHD (valvular heart disease) 01/01/1998    :   Past Surgical History:   Procedure Laterality Date    CARDIAC SURGERY      CARDIAC VALVE SURGERY      MITRAL VALVE REPLACEMENT  2/11/98    [employer] serial # [id number], model #     TUBAL LIGATION       Medications:  Scheduled Meds:   insulin lispro  0-6 Units SubCUTAneous Q4H    famotidine (PEPCID) injection  20 mg IntraVENous BID    warfarin (COUMADIN) daily dosing (placeholder)   Other RX Placeholder    chlorhexidine  15 mL Mouth/Throat BID     Continuous Infusions:   DOPamine Stopped (01/10/22 1848)    propofol 62.696 mcg/kg/min (01/11/22 7059)    sodium chloride 100 mL/hr at 01/10/22 2246    fentaNYL 63 mcg/hr (01/11/22 0253)     PRN Meds:.polyvinyl alcohol **AND** lubrifresh P.M., prochlorperazine, midazolam, potassium chloride, magnesium sulfate, ondansetron **OR** ondansetron    Allergies   Allergen Reactions    Adenosine     Pravachol [Pravastatin Sodium] Other (See Comments)     Muscle aches, stomach ache    Statins Other (See Comments)     Causes muscle aches.      Social History     Socioeconomic History    Marital status:      Spouse name: Not on file    Number of children: Not on file    Years of education: Not on file    Highest education level: Not on file   Occupational History    Not on file   Tobacco Use    Smoking status: Former Smoker    Smokeless tobacco: Never Used   Substance and Sexual Activity    Alcohol use: No     Alcohol/week: 0.0 standard drinks    Drug use: No    Sexual activity: Yes     Partners: Male     Comment:    Other Topics Concern    Not on file   Social History Narrative    Not on file     Social Determinants of Health     Financial Resource Strain:     Difficulty of Paying Living Expenses: Not on file   Food Insecurity:     Worried About Running Out of Food in the Last Year: Not on file    Roderick of Food in the Last Year: Not on file   Transportation Needs:     Lack of Transportation (Medical): Not on file    Lack of Transportation (Non-Medical): Not on file   Physical Activity:     Days of Exercise per Week: Not on file    Minutes of Exercise per Session: Not on file   Stress:     Feeling of Stress : Not on file   Social Connections:     Frequency of Communication with Friends and Family: Not on file    Frequency of Social Gatherings with Friends and Family: Not on file    Attends Zoroastrian Services: Not on file    Active Member of [address] [place] [address] [employer] or Organizations: Not on file    Attends Club or Organization Meetings: Not on file    Marital Status: Not on file   Intimate Partner Violence:     Fear of Current or Ex-Partner: Not on file    Emotionally Abused: Not on file    Physically Abused: Not on file    Sexually Abused: Not on file   Housing Stability:     Unable to Pay for Housing in the Last Year: Not on file    Number of [place] in the Last Year: Not on file    Unstable Housing in the Last Year: Not on file      Family History   Problem Relation Age of Onset    Heart Disease Father          ROS (10 systems)  Patient intubated on vent.  Unable to ask ROS questions at this time. Physical Exam:      Vitals:    01/11/22 0745 01/11/22 0800 01/11/22 0815 01/11/22 0830   BP:       Pulse: 80 80 83 85   Resp: 14 14 14 14   Temp:  98.4 °F (36.9 °C)     TempSrc:  Core     SpO2: 99% 99% 99% 98%   Weight:           Wt Readings from Last 3 Encounters:   01/10/22 210 lb 15.7 oz (95.7 kg)   09/21/21 212 lb 9.6 oz (96.4 kg)   07/06/21 219 lb 11.2 oz (99.7 kg)     Temp Readings from Last 3 Encounters:   01/11/22 98.4 °F (36.9 °C) (Core)   07/07/21 98.4 °F (36.9 °C) (Oral)     BP Readings from Last 3 Encounters:   01/10/22 (!) 137/57   09/21/21 (!) 140/84   07/07/21 (!) 166/72     Pulse Readings from Last 3 Encounters:   01/11/22 85   09/21/21 60   07/07/21 60        Gen: Sedated and Intubated on vent, on no withdraw to pain, not following commands  HEENT: NC/AT, oculocephalics intact, pupils are fixed 1 mm, dmm, neck supple  Heart: Sinus rhythm will monitor  Lungs:  On vent  Ext: no edema,   Psych: TA  Skin: no rashes or lesions    NEUROLOGIC EXAM:    Mental Status:  NAD, Sedated and Intubated on vent, on no withdraw to pain, not following commands    Cranial Nerve Exam:   CN II-XII: Pupils nonreactive, fixed, , no nystagmus, no gaze paresis,  Face appears symmetrical, tongue midline against ET tube    Motor Exam:       Strength no movement or withdraw to pain  Tone and bulk normal   NATALIE pronator drift    Deep Tendon Reflexes: 0/4 right biceps, and triceps, brachioradialis, unable to assess left arm is casted,0/4 patellar, and achilles b/l; flexor plantar responses b/l    Sensation: No movement to painful stimulation, no withdrawal of extremities x4    Coordination/Cerebellum:       Tremors--bilateral lower extremities, mouth and bilateral eyes      Rapidly alternating movements: NATALIE            Heel-to-Shin: NATALIE      Finger-to-Nose: NATALIE  Gait and stance:      Gait: deferred      LABS:        CBC:   Recent Labs     01/11/22  0450   WBC 9.8   RBC 3.26*   HGB 8.7*   HCT 28.4* *   MCV 87.1     BMP:    Recent Labs     01/11/22  0450      K 4.2   *   CO2 17*   BUN 9   CREATININE 1.3*   GLUCOSE 117*   CALCIUM 7.4*   2D echo  Summary   Expedited imaging was used to obtain protocol images to reduce the   sonographer's exposure during COVID-19 pandemic. Left ventricular systolic function is normal.   Ejection fraction is visually estimated at 50%. Mild left ventricular hypertrophy. Moderately dilated atrium bilaterally. Trace aortic regurgitation is noted. S/p mitral valve replacement (2/11/1998). Mild tricuspid regurgitation is present. RVSP is 36 mmHg. No pericardial effusion present. IMAGING:    [facility] 01/09/22  Impression   No acute intracranial abnormality.       Stable chronic changes described above. Repeat [facility] 01/10/22  Impression   1. No acute intracranial abnormality. 2. Stable bilateral cerebellar and right frontal encephalomalacia in keeping   with sequela of prior infarcts. Above imaging personally reviewed in detail. ASSESSMENT/PLAN:   75-year-old female patient with past medical history stated above presents to [facility] status post cardiac arrest, in which patient was at home found pulseless and apneic. Unknown downtime as CPR was started upon EMS arrival.  Patient did regain ROSC, intubated in the field. On presentation patient found to be COVID-positive. Patient does have a history of mitral valve prolapse and was on Coumadin, however patient is supratherapeutic Coumadin is on hold per IM team.  Neurology being consulted for possible anoxic injury. 1. Possible anoxic injury  -Unknown downtime, patient found apneic and pulseless by EMS, and received defibrillation upon CPR and rhythm of V. Fib.  --On examination, patient only has weak cough, otherwise the remainder of her brainstem reflexes are absent however sedation does limit our ability to examine the patient consistently    2. Myoclonic jerks vs seizure like activity likely secondary to #1  --Not 100% sure  that jerks are consistent with myoclonus as patient has a consistent rhythmic movement without stimulation. This brings some suspicion for seizure-like activity, in particular concerning for focal status. -- EEG ordered  -- Will order 1500 mg bolus valproic acid IV x1  -  Will start Valproic acid 500 mg IV twice daily    Patient remains on sedation, recommend this being decreased as his limits are neurological exam.    Neurodiagnostics  - CTH as above  - EEG ordered  -- 2D echo as above      Patient discussed with attending physician Dr. Ariana Gunderson    Thank you for allowing us to participate in the care of your patient. If there are any questions regarding evaluation please feel free to contact us. JOSSY Caldwell - ENRIQUE, 1/11/2022    ------------------------------------    Attending Note:  I have rounded on this patient with Betty Gentile CNP. I have reviewed the chart and we have discussed this case in detail. The patient was seen and examined by myself. Pertinent labs and imaging have been personally reviewed. Our findings and impressions were discussed with the patient. I concur with the Nurse Practitioners assessment and plan. Patient seen and examined at bedside. Sedation held just prior to my examination. She has no brainstem or cortical reflexes on my examination. Recommend decreasing/eliminating sedation to aid with neurologic examinations as I cannot accurately neuro prognosticate while patient is on sedating agents. Given the story, there is significant concern for hypoxic ischemic encephalopathy. CT head was reviewed personally and on personal review does appear to demonstrate decreased gray-white matter differentiation which is concerning for hypoxic injury. Movements on examination are less concerning from a seizure standpoint, nonetheless, we will go ahead and bolus the patient on Depakote since we cannot get a stat EEG at this facility. Routine EEG is currently pending.       Aroldo Paul DO 1/11/2022 4:52 PM

## 2022-01-11 NOTE — PLAN OF CARE
Problem: Airway Clearance - Ineffective  Goal: Achieve or maintain patent airway  Outcome: Ongoing     Problem: Gas Exchange - Impaired  Goal: Absence of hypoxia  Outcome: Ongoing  Goal: Promote optimal lung function  Outcome: Ongoing     Problem: Breathing Pattern - Ineffective  Goal: Ability to achieve and maintain a regular respiratory rate  Outcome: Ongoing     Problem:  Body Temperature -  Risk of, Imbalanced  Goal: Ability to maintain a body temperature within defined limits  Outcome: Ongoing  Goal: Will regain or maintain usual level of consciousness  Outcome: Ongoing  Goal: Complications related to the disease process, condition or treatment will be avoided or minimized  Outcome: Ongoing     Problem: Isolation Precautions - Risk of Spread of Infection  Goal: Prevent transmission of infection  Outcome: Ongoing     Problem: Nutrition Deficits  Goal: Optimize nutritional status  Outcome: Ongoing     Problem: Risk for Fluid Volume Deficit  Goal: Maintain normal heart rhythm  Outcome: Ongoing  Goal: Maintain absence of muscle cramping  Outcome: Ongoing  Goal: Maintain normal serum potassium, sodium, calcium, phosphorus, and pH  Outcome: Ongoing     Problem: Loneliness or Risk for Loneliness  Goal: Demonstrate positive use of time alone when socialization is not possible  Outcome: Ongoing     Problem: Fatigue  Goal: Verbalize increase energy and improved vitality  Outcome: Ongoing     Problem: Patient Education: Go to Patient Education Activity  Goal: Patient/Family Education  Outcome: Ongoing     Problem: Cardiac:  Goal: Ability to maintain an adequate cardiac output will improve  Description: Ability to maintain an adequate cardiac output will improve  Outcome: Ongoing  Goal: Complications related to the disease process, condition or treatment will be avoided or minimized  Description: Complications related to the disease process, condition or treatment will be avoided or minimized  Outcome: Ongoing  Goal: Hemodynamic stability will improve  Description: Hemodynamic stability will improve  Outcome: Ongoing  Goal: Risk factors for ineffective tissue perfusion will decrease  Description: Risk factors for ineffective tissue perfusion will decrease  Outcome: Ongoing     Problem: Fluid Volume:  Goal: Will show no signs or symptoms of fluid imbalance  Description: Will show no signs or symptoms of fluid imbalance  Outcome: Ongoing     Problem: Falls - Risk of:  Goal: Will remain free from falls  Description: Will remain free from falls  Outcome: Ongoing  Goal: Absence of physical injury  Description: Absence of physical injury  Outcome: Ongoing     Problem: Skin Integrity:  Goal: Will show no infection signs and symptoms  Description: Will show no infection signs and symptoms  Outcome: Ongoing  Goal: Absence of new skin breakdown  Description: Absence of new skin breakdown  Outcome: Ongoing

## 2022-01-11 NOTE — PROGRESS NOTES
01/11/22 0440   Vent Information   Vent Type 980   Vent Mode AC/VC   Vt Ordered 500 mL   Rate Set 14 bmp   Peak Flow 50 L/min   Pressure Support 0 cmH20   FiO2  40 %   SpO2 100 %   SpO2/FiO2 ratio 250   Sensitivity 3   PEEP/CPAP 5   I Time/ I Time % 0 s   Vent Patient Data   High Peep/I Pressure 0   Peak Inspiratory Pressure 22 cmH2O   Mean Airway Pressure 9.4 cmH20   Rate Measured 14 br/min   Vt Exhaled 512 mL   Minute Volume 7.17 Liters   I:E Ratio 1:2.90   Spontaneous Breathing Trial (SBT) RT Doc   Pulse 80   Additional Respiratory  Assessments   Resp 19   Alarm Settings   High Pressure Alarm 44 cmH2O   Delay Alarm 20 sec(s)   Low Minute Volume Alarm 2.5 L/min   Apnea (secs) 20 secs   High Respiratory Rate 40 br/min   Low Exhaled Vt  250 mL   Non-Surgical Airway 01/09/22   Placement Date/Time: 01/09/22 0733   Size: (c) 7  Placement Verified By[de-identified] Colorimetric EtCO2 device; Chest X-ray   Secured at 22 cm   Measured From Lips   Secured Location Right   Secured By Commercial tube coyne   Site Condition Dry

## 2022-01-12 NOTE — PROGRESS NOTES
Pt's two daughters back to unit, spoke with them in detail regarding pt's vitals and overall assessment. Dr. Alcira Smith spoke with them both earlier and they want to decide on future plans for care. Would like to discuss with patient's  who is also admitted for covid in another unit. Will get back with this RN regarding advance directives. Will let Dr. Yesenia More know as well.

## 2022-01-12 NOTE — PROGRESS NOTES
Progress Note( Dr. Alena Woodall)  1/11/2022  Subjective:   Admit Date: 1/8/2022  PCP: Jay Guzman MD    Admitted For :Cardiac arrest    Consulted For: Better control of blood glucose    Interval History: Patient has been off i Arctic pump. On subcu insulin if needed  Patient is sedated intubated and on ventilator      Intake/Output Summary (Last 24 hours) at 1/11/2022 2132  Last data filed at 1/11/2022 1900  Gross per 24 hour   Intake 3028.6 ml   Output 305 ml   Net 2723.6 ml       DATA    CBC:   Recent Labs     01/10/22  0620 01/10/22  0621 01/10/22  1534 01/10/22  1833 01/11/22  0450   WBC 9.2  --   --   --  9.8   HGB 10.6*   < > 9.9* 10.3* 8.7*     --   --   --  132*    < > = values in this interval not displayed. CMP:  Recent Labs     01/10/22  0620 01/10/22  0621 01/10/22  1220 01/10/22  1316 01/10/22  1534 01/10/22  1833 01/11/22  0450      < > 145   < > 147* 147* 143   K 3.2*  3.4*   < > 3.5   < > 3.7 3.9 4.2   *  --  116*  --   --   --  115*   CO2 18*   < > 18*   < > 20* 18* 17*   BUN 4*  --  5*  --   --   --  9   CREATININE 0.5*   < > 0.5*   < > 0.8 0.9 1.3*   CALCIUM 7.8*  --  7.8*  --   --   --  7.4*    < > = values in this interval not displayed. Lipids:   Lab Results   Component Value Date    CHOL 172 07/05/2021    HDL 51 07/05/2021    TRIG 109 07/05/2021     Glucose:  Recent Labs     01/11/22  0818 01/11/22  1219 01/11/22  1709   POCGLU 95 95 107*     [id number]:  Lab Results   Component Value Date    LABA1C 6.4 07/05/2021     High Sensitivity TSH:   Lab Results   Component Value Date    TSHHS 1.107 10/29/2010     Free T3:   Lab Results   Component Value Date    FT3 2.3 10/29/2010     Free T4:  Lab Results   Component Value Date    T4FREE 1.04 10/29/2010       XR CHEST PORTABLE   Final Result   Endotracheal tube and nasogastric tube appear acceptable. Bibasilar opacities and some increase in right perihilar opacities.   Could   relate to increasing perihilar edema with left pleural effusion and   atelectasis. However underlying pneumonia is not excluded. CT HEAD WO CONTRAST   Final Result   1. No acute intracranial abnormality. 2. Stable bilateral cerebellar and right frontal encephalomalacia in keeping   with sequela of prior infarcts. XR CHEST PORTABLE   Final Result   Stable lines and tubes. Mild perihilar opacities and bilateral lower lung atelectasis, and small left   pleural effusion, suggest mild increased pulmonary edema. XR ABDOMEN (KUB) (SINGLE AP VIEW)   Final Result   OG tip in region of gastric antrum and side port in body of stomach         CTA PULMONARY W CONTRAST   Final Result   1. No hemothorax, pneumothorax, or large area of consolidation. There are   fractures in the anterior left 3rd through possibly 5th rib and questionably   at the right 4th and 5th rib. 2.  No large or central pulmonary arterial embolism. Respiratory motion   degrades some of the mid to distal branches without a convincing evidence for   pulmonary embolism. 3.  Dilated right atrium      4. Old granulomatous disease with calcified and noncalcified nodules in the   upper lobes. Evaluation in the lower lungs is degraded by the motion   artifact. Comparison with any old outside studies would be beneficial.  If   no prior studies could have follow-up chest CT for full evaluation once the   patient is stabilized and over the acute issues. RECOMMENDATIONS:   Unavailable         CT HEAD WO CONTRAST   Final Result   No acute intracranial abnormality. Stable chronic changes described above. RECOMMENDATIONS:   Unavailable         XR CHEST PORTABLE   Final Result   1. Endotracheal tube tip is 4.6 cm above the randy. 2. Enteric tube tip side port is at the gastroesophageal junction. The tube   should be advanced. 3. Unchanged cardiomegaly without acute abnormality.          XR CHEST PORTABLE    (Results Pending)        Scheduled Medicines   Medications:    [START ON 1/12/2022] valproate sodium (DEPACON) IVPB  500 mg IntraVENous Q8H    insulin lispro  0-6 Units SubCUTAneous Q4H    famotidine (PEPCID) injection  20 mg IntraVENous BID    warfarin (COUMADIN) daily dosing (placeholder)   Other RX Placeholder    chlorhexidine  15 mL Mouth/Throat BID      Infusions:    vasopressin (Septic Shock) infusion 0.04 Units/min (01/11/22 2028)    norepinephrine 2 mcg/min (01/11/22 2043)    DOPamine Stopped (01/10/22 1848)    propofol 63 mcg/kg/min (01/11/22 2049)    sodium chloride 100 mL/hr at 01/11/22 2050    fentaNYL 63 mcg/hr (01/11/22 1900)         Objective:   Vitals: BP (!) 137/57   Pulse 55   Temp 98.6 °F (37 °C) (Core)   Resp 14   Ht 5' 7\" (1.702 m)   Wt 210 lb 15.7 oz (95.7 kg)   SpO2 97%   BMI 33.04 kg/m²   General appearance: Patient is sedated intubated and on ventilator  Neck: no JVD or bruit  Thyroid : Normal lobes   Lungs: Has Vesicular Breath sounds   Heart:  regular rate and rhythm  Abdomen: soft, non-tender; bowel sounds normal; no masses,  no organomegaly  Musculoskeletal: Normal  Extremities: extremities normal, , no edema  Neurologic: Patient is sedated intubated and on ventilator  Assessment:     Patient Active Problem List:     Mitral valve prolapse     Afib (HCC)     H/O prior ablation treatment     VHD (valvular heart disease)     History of mitral valve prosthesis     Abnormal nuclear stress test     Atrioventricular godwin re-entry tachycardia (HCC)     H/O: CVA (cerebrovascular accident)     Anticoagulated on Coumadin     Atrial fibrillation and flutter (HCC)     Acute CVA (cerebrovascular accident) [facility])     Essential hypertension     Cardiac arrest ([facility] [address]      Plan:     1. Reviewed POC blood glucose . Labs and X ray results   2. Reviewed Current Medicines   3. On Correction bolus Humalog/ Insulin regime   4. Monitor Blood glucose frequently   5.  Modified  the dose of Insulin/ other medicines as needed 6. Will follow     .      Bryson Brooks MD, MD

## 2022-01-12 NOTE — PROGRESS NOTES
Neurology Service Progress Note  [facility]  Patient Name: Nisha Saucedo  : 1950        Subjective:   Reason for consult: \"Assessing for anoxic injury\"  Patient seen and examined. Chart reviewed in detail. Patient exam remains unchanged. Per chart review, there has been conversation with providers of the medical team with patient's daughters, in which they are considering withdrawal of care. We will follow peripherally at this time. Past Medical History:   Diagnosis Date    Afib [facility])     ablation     Anticoagulated on Coumadin     **PCP follows pt's PT/INRs, along w/prescribing her Coumadin. **    Atrial fibrillation and flutter ([address] 2005    On Coumadin.  Atrioventricular godwin re-entry tachycardia [address]     H/O echocardiogram 10/26/2006    EF 45-50%. Mod to sev septal hypokinesis. Bi-leaflet ([employer]), mechanical prosthesis.  H/O prior ablation treatment 1997    Dr. Quan Dugan H/O rheumatic heart disease     H/O: CVA (cerebrovascular accident)     History of mitral valve prosthesis     Metallic medtronic valve    HTN (hypertension)     Hx of cardiovascular stress test 2015    lexiscan-mild to moderate ischemia basal inferior and mid inferior,EF56%    Hx of echocardiogram 2015    EF. 45-50%. Moderate left atrial enlargement. Regional wall motion abnormality w/ probably mild reduction of LVSF. Normal sized abd aorta at 2.2cm. Mild aortic regurgitation.  Hx of echocardiogram 2021    50-55%. mechanical prosthetic valve is present with a meangradient of 2mmHg    Hyperlipemia     Mitral valve prolapse     S/P MVR (mitral valve repair) 1991    VHD (valvular heart disease) 1998    :   Past Surgical History:   Procedure Laterality Date    CARDIAC SURGERY      CARDIAC VALVE SURGERY      MITRAL VALVE REPLACEMENT  98    [employer] serial # [id number], model #     TUBAL LIGATION Strain:     Difficulty of Paying Living Expenses: Not on file   Food Insecurity:     Worried About Running Out of Food in the Last Year: Not on file    Roderick of Food in the Last Year: Not on file   Transportation Needs:     Lack of Transportation (Medical): Not on file    Lack of Transportation (Non-Medical):  Not on file   Physical Activity:     Days of Exercise per Week: Not on file    Minutes of Exercise per Session: Not on file   Stress:     Feeling of Stress : Not on file   Social Connections:     Frequency of Communication with Friends and Family: Not on file    Frequency of Social Gatherings with Friends and Family: Not on file    Attends [employer] Services: Not on file    Active Member of [employer] or Organizations: Not on file    Attends Club or Organization Meetings: Not on file    Marital Status: Not on file   Intimate Partner Violence:     Fear of Current or Ex-Partner: Not on file    Emotionally Abused: Not on file    Physically Abused: Not on file    Sexually Abused: Not on file   Housing Stability:     Unable to Pay for Housing in the Last Year: Not on file    Number of [place] in the Last Year: Not on file    Unstable Housing in the Last Year: Not on file      Family History   Problem Relation Age of Onset    Heart Disease Father        Physical Exam:      Vitals:    01/12/22 1215 01/12/22 1230 01/12/22 1245 01/12/22 1315   BP:       Pulse: 101 93 94 90   Resp: 14 14 14 14   Temp:       TempSrc:       SpO2: 90% 90% 90%    Weight:       Height:           Wt Readings from Last 3 Encounters:   01/10/22 210 lb 15.7 oz (95.7 kg)   09/21/21 212 lb 9.6 oz (96.4 kg)   07/06/21 219 lb 11.2 oz (99.7 kg)     Temp Readings from Last 3 Encounters:   01/12/22 98.4 °F (36.9 °C) (Core)   07/07/21 98.4 °F (36.9 °C) (Oral)     BP Readings from Last 3 Encounters:   01/10/22 (!) 137/57   09/21/21 (!) 140/84   07/07/21 (!) 166/72     Pulse Readings from Last 3 Encounters:   01/12/22 90   09/21/21 60   07/07/21 60        Gen: Sedated and Intubated on vent, on no withdraw to pain, not following commands  HEENT: NC/AT, oculocephalics intact, pupils are fixed 1 mm, dmm, neck supple  Heart: Sinus rhythm on monitor  Lungs: On vent  Ext: no edema,   Psych: NATALIE  Skin: no rashes or lesions    NEUROLOGIC EXAM:    Mental Status:  NAD, Sedated and Intubated on vent, on no withdraw to pain, not following commands. Cranial Nerve Exam:   CN II-XII: Pupils nonreactive, fixed, , no nystagmus, no gaze paresis,  Face appears symmetrical, tongue midline against ET tube, weak cough, absent gag reflex    Motor Exam:       Strength no movement or withdraw to pain  Tone and bulk normal   NATALIE pronator drift    Deep Tendon Reflexes: 0/4 right biceps, and triceps, brachioradialis, unable to assess left arm is casted,0/4 patellar, and achilles b/l; flexor plantar responses b/l    Sensation: No movement to painful stimulation, no withdrawal of extremities x4    Coordination/Cerebellum:       Tremors-none      Rapidly alternating movements: NATALIE            Heel-to-Shin: NATALIE      Finger-to-Nose: NATALIE  Gait and stance:      Gait: deferred      LABS:        CBC:   Recent Labs     01/12/22  0510   WBC 9.2   RBC 3.49*   HGB 9.2*   HCT 31.5*      MCV 90.3     BMP:    Recent Labs     01/12/22  0510      K 4.1   *   CO2 11*   BUN 21   CREATININE 2.7*   GLUCOSE 112*   CALCIUM 7.3*   2D echo  Summary   Expedited imaging was used to obtain protocol images to reduce the   sonographer's exposure during COVID-19 pandemic. Left ventricular systolic function is normal.   Ejection fraction is visually estimated at 50%. Mild left ventricular hypertrophy. Moderately dilated atrium bilaterally. Trace aortic regurgitation is noted. S/p mitral valve replacement (2/11/1998). Mild tricuspid regurgitation is present. RVSP is 36 mmHg. No pericardial effusion present.     IMAGING:    [facility] 01/09/22  Impression   No acute intracranial abnormality.       Stable chronic changes described above. Repeat [facility] 01/10/22  Impression   1. No acute intracranial abnormality. 2. Stable bilateral cerebellar and right frontal encephalomalacia in keeping   with sequela of prior infarcts. Above imaging personally reviewed in detail. ASSESSMENT/PLAN:   70-year-old female patient with past medical history stated above presents to [facility] status post cardiac arrest, in which patient was at home found pulseless and apneic. Unknown downtime as CPR was started upon EMS arrival.  Patient did regain ROSC, intubated in the field. On presentation patient found to be COVID-positive. Patient does have a history of mitral valve prolapse and was on Coumadin, however patient is supratherapeutic Coumadin is on hold per IM team.  Neurology being consulted for possible anoxic injury. Exam findings are unchanged today. 1. Possible anoxic injury  -Unknown downtime, patient found apneic and pulseless by EMS, and received defibrillation upon CPR and rhythm of V. Fib.  -- Exam findings are unchanged, as patient only has weak cough, otherwise the remainder of her brainstem reflexes are absent however sedation does limit our ability to examine the patient consistently    2. Myoclonic jerks vs seizure like activity likely secondary to #1  --Not 100% sure  that jerks are consistent with myoclonus as patient has a consistent rhythmic movement without stimulation. This brings some suspicion for seizure-like activity, in particular concerning for focal status. -- EEG ordered  -- Will order 1500 mg bolus valproic acid IV x1  -  Will start Valproic acid 500 mg IV twice daily    Patient remains on sedation, recommend this being decreased as this limits neurological exam.    Neurodiagnostics  - CTH as above, which is concerning for toxic injury as the gray-white matter differentiation is decreased.   - EEG ordered  -- 2D echo as above      Patient discussed with attending physician Dr. Fartun Pereira    Thank you for allowing us to participate in the care of your patient. If there are any questions regarding evaluation please feel free to contact us.      JOSSY Reyes - ENRIQUE, 1/12/2022

## 2022-01-12 NOTE — PROGRESS NOTES
PHARMACY ANTICOAGULATION [address] is a 66 y.o. female on warfarin therapy for atrial fibrillation, mechanical mitral valve and history of CVA. Pharmacy consulted by Dr. Neha Brock for monitoring and adjustment of treatment. Indication for anticoagulation: A-Fib, Hx of CVA, mechanical mitral valve  INR goal: 2.5-3.5  Warfarin dose prior to admission: reported 2 mg daily    Pertinent Laboratory Values   Recent Labs     01/10/22  0620 01/10/22  0621 01/11/22  0450 01/11/22  0450 01/12/22  0510 01/12/22  0558   INR 7.26*   < > 7.38*   < >  --  8.65*   HGB 10.6*   < > 8.7*   < > 9.2*  --    HCT 33.1*   < > 28.4*   < > 31.5*  --      --  132*  --  172  --     < > = values in this interval not displayed. Assessment/Plan:     INR supra-therapeutic @ 8.65, slight increase in trends   INR has been elevated since admission   No warfarin doses have been given since 01/08, MD notes no evidence of bleeding.    Continue to hold warfarin until INR closer to goal range   Pharmacy will continue to monitor and adjust warfarin therapy as indicated    Thank you for the consult,  Zac Hernandez, [facility]  1/12/2022 8:43 AM

## 2022-01-12 NOTE — PROGRESS NOTES
Lab notified RN of INR 8.95 .1. Dr. Los Noe and Dr. Raulito Proctor notified via [facility]. Phytonadione ordered and administered. Cardiac aware as well.

## 2022-01-12 NOTE — PROGRESS NOTES
Pt seen and examined full note to follow  Start bicarb gtt  Correct inr with ffp and vit K  May need dialysis and will try dw family as well and on arctic sun  Need inr decrease to place temp hd line if will need hd

## 2022-01-12 NOTE — PROGRESS NOTES
Hospitalist Progress Note       1/12/2022 12:05 PM  Admit Date: 1/8/2022    PCP: Trent Artis MD     Assessment and Plan:   1.  V. fib cardiac arrest: Out of hospital cardiac arrest-status post ACLS/defibrillation. Intubated-continue vent management. ROSC achieved and now on temperature targeted management. She was on amiodarone-discontinue because of bradycardia. Echo-normal EF. Pulmonary and cardiology consults appreciated. 2.  Shock: Cardiogenic versus sepsis. Echo-EF 50%. Continue Levophed and vasopressin drips. 3.  Possible thoracic brain injury: Neurology input acknowledged-difficult to assess while on sedatives. EEG is pending. 4.  Paroxysmal atrial fibrillation: Hold anticoagulant while on TTM. 5.  COVID-pneumonia: Started on Decadron. Given Tocilizumab. On Levaquin and cefepime. Pulmonary consult acknowledged. 6.  Supratherapeutic INR/metabolic MVR: Coumadin is held. Check LFT and DIC panel. No evidence of active bleeding. While INR is less than 2.5, will start on heparin drip. 7.  Acute kidney injury/metabolic acidosis: Nephrology consult acknowledged. Started on bicarbonate drip. Patient may need hemodialysis-give FFP and vitamin K.    8.  Hypomagnesemia: Replace. DVT prophylaxis: On Coumadin  Disposition: To be determined.         Patient Active Problem List:     Mitral valve prolapse     Afib (HCC)     H/O prior ablation treatment     VHD (valvular heart disease)     History of mitral valve prosthesis     Abnormal nuclear stress test     Atrioventricular godwin re-entry tachycardia ([address]     H/O: CVA (cerebrovascular accident)     Anticoagulated on Coumadin     Atrial fibrillation and flutter ([address]     Acute CVA (cerebrovascular accident) ([address]     Essential hypertension     Cardiac arrest ([address]     Severe hypoxic-ischemic encephalopathy      Subjective:     Chief Complaint   Patient presents with    Cardiac Arrest       F/U:  Interval History: Discussed with the nurse.    No reported dizziness overnight. No witnessed seizure activity. Objective: Intake/Output Summary (Last 24 hours) at 1/12/2022 1205  Last data filed at 1/12/2022 0818  Gross per 24 hour   Intake 5372.74 ml   Output 235 ml   Net 5137.74 ml      Vitals:   Vitals:    01/12/22 1146   BP:    Pulse: 97   Resp: 14   Temp:    SpO2: (!) 89%     Physical Exam:  General: Orally intubated. Eyes: Not pale. Anicteric. Neurologic: Sedated. Cardiovascular: Regular. No murmur or history  Respiratory: Good air entry bilaterally. No wheezes or crepitation  Abdomen: Soft. No tenderness. No palpable mass. : Proctor catheter noted. Extremities: No pedal edema. Feet are warm.     Electronically signed by Alberto Tyler MD on 1/12/2022 at 12:05 PM  [facility]

## 2022-01-12 NOTE — PROGRESS NOTES
Dr. Tom Josue spoke with patient's family, plans to withdraw care as family wishes. Dr. Wilmer Worthy, Dr. Nathalia Murphy, Dr. Franci Holly, and Dr. Sonia Guerra all notified. [employer] notified at this time as well.

## 2022-01-12 NOTE — PROGRESS NOTES
Notified all providers via [employer] that the patient . Packet signed by Brayden Navarro (Patients daughter).

## 2022-01-12 NOTE — PROGRESS NOTES
I dw pt daughters and they are coming to see pt.  They are thnking about withdraw care and comfort and I would support that after their visit

## 2022-01-12 NOTE — CONSULTS
inferior,EF56%    Hx of echocardiogram 09/30/2015    EF. 45-50%. Moderate left atrial enlargement. Regional wall motion abnormality w/ probably mild reduction of LVSF. Normal sized abd aorta at 2.2cm. Mild aortic regurgitation.  Hx of echocardiogram 06/22/2021    50-55%. mechanical prosthetic valve is present with a meangradient of 2mmHg    Hyperlipemia     Mitral valve prolapse     S/P MVR (mitral valve repair) 12/23/1991    VHD (valvular heart disease) 01/01/1998       Past Surgical History:        Procedure Laterality Date    CARDIAC SURGERY      CARDIAC VALVE SURGERY      MITRAL VALVE REPLACEMENT  2/11/98    [employer] serial # [id number], model #     TUBAL LIGATION         Medications:   Scheduled Meds:   scopolamine  1 patch TransDERmal Q72H     Continuous Infusions:   sodium chloride       PRN Meds:.sodium chloride, morphine, LORazepam, polyvinyl alcohol **AND** lubrifresh P.M., prochlorperazine, midazolam, potassium chloride, magnesium sulfate, ondansetron **OR** ondansetron    Allergies:  Adenosine, Pravachol [pravastatin sodium], and Statins    Social History:   TOBACCO:   reports that she has quit smoking. She has never used smokeless tobacco.  ETOH:   reports no history of alcohol use. OCCUPATION:      Family History:       Problem Relation Age of Onset    Heart Disease Father        REVIEW OF SYSTEMS:  Negative except for sedated on the ventilator. Physical Exam:    I/O: 01/11 0701 - 01/12 0700  In: 5372.7 [I.V.:4951]  Out: 235 [Urine:70]    Vitals: BP (!) 137/57   Pulse 98   Temp 98.6 °F (37 °C) (Core)   Resp 14   Ht 5' 7\" (1.702 m)   Wt 210 lb 15.7 oz (95.7 kg)   SpO2 96%   BMI 33.04 kg/m²   General appearance: Intubated unresponsive  HEENT: Head: Normal, normocephalic, atraumatic.   Neck: supple, symmetrical, trachea midline  Lungs: diminished breath sounds bilaterally  Heart: S1, S2 normal is post mitral valve repair  Abdomen: abnormal findings:  soft NT  Extremities: edema trace  Neurologic: Mental status: alertness: Intubated      CBC:   Recent Labs     01/10/22  0620 01/10/22  0621 01/10/22  1833 01/11/22  0450 01/12/22  0510   WBC 9.2  --   --  9.8 9.2   HGB 10.6*   < > 10.3* 8.7* 9.2*     --   --  132* 172    < > = values in this interval not displayed. BMP:    Recent Labs     01/10/22  1220 01/10/22  1316 01/10/22  1833 01/11/22  0450 01/12/22  0510      < > 147* 143 142   K 3.5   < > 3.9 4.2 4.1   *  --   --  115* 113*   CO2 18*   < > 18* 17* 11*   BUN 5*  --   --  9 21   CREATININE 0.5*   < > 0.9 1.3* 2.7*   GLUCOSE 109*  --   --  117* 112*    < > = values in this interval not displayed. Hepatic:   Recent Labs     01/12/22  0900   AST 64*   ALT 12   BILITOT 0.5   ALKPHOS 59     Troponin: No results for input(s): TROPONINI in the last 72 hours. BNP: No results for input(s): BNP in the last 72 hours. Lipids: No results for input(s): CHOL, HDL in the last 72 hours.     Invalid input(s): LDLCALCU  ABGs:   Lab Results   Component Value Date    PO2ART 50 01/12/2022    BFU7JXL 37.0 01/12/2022     INR:   Recent Labs     01/11/22  0450 01/12/22  0558 01/12/22  0900   INR 7.38* 8.65* 8.95*     Renal Labs  Albumin:    Lab Results   Component Value Date    LABALBU 2.5 01/12/2022     Calcium:    Lab Results   Component Value Date    CALCIUM 7.3 01/12/2022     Phosphorus:    Lab Results   Component Value Date    PHOS 5.2 01/11/2022     U/A:    Lab Results   Component Value Date    NITRU NEGATIVE 07/06/2021    COLORU YELLOW 07/06/2021    WBCUA 4 07/06/2021    RBCUA 8 07/06/2021    TRICHOMONAS NONE SEEN 07/06/2021    BACTERIA RARE 07/06/2021    CLARITYU CLEAR 07/06/2021    SPECGRAV 1.005 07/06/2021    UROBILINOGEN NEGATIVE 07/06/2021    BILIRUBINUR NEGATIVE 07/06/2021    BLOODU SMALL 07/06/2021    KETUA NEGATIVE 07/06/2021     ABG:    Lab Results   Component Value Date    SLY9PHR 37.0 01/12/2022    PO2ART 50 01/12/2022    IFZ7AXB 12.3 01/12/2022     HgBA1c: Lab Results   Component Value Date    LABA1C 6.4 07/05/2021     Microalbumen/Creatinine ratio:  No components found for: RUCREAT  TSH:  No results found for: TSH  IRON:  No results found for: IRON  Iron Saturation:  No components found for: PERCENTFE  TIBC:  No results found for: TIBC  FERRITIN:    Lab Results   Component Value Date    FERRITIN 202 01/09/2022     RPR:  No results found for: RPR  JUAN MANUEL:  No results found for: ANATITER, JUAN MANUEL  24 Hour Urine for Creatinine Clearance:  No components found for: CREAT4, UHRS10, UTV10  -----------------------------------------------------------------      Assessment and Recommendations     Patient Active Problem List   Diagnosis Code    Mitral valve prolapse I34.1    Afib ([facility] I48.91    H/O prior ablation treatment Z98.890    VHD (valvular heart disease) I38    History of mitral valve prosthesis Z95.2    Abnormal nuclear stress test R94.39    Atrioventricular godwin re-entry tachycardia ([facility] I47.1    H/O: CVA (cerebrovascular accident) Z80.78    Anticoagulated on Coumadin Z79.01    Atrial fibrillation and flutter (HCC) I48.91, I48.92    Acute CVA (cerebrovascular accident) ([facility] I63.9    Essential hypertension I10    Cardiac arrest ([facility] I46.9    Severe hypoxic-ischemic encephalopathy P91.63     Impression plan  #1 status post cardiac arrest  #2 status post mitral valve repair on anticoagulation increased INR  #3 metabolic acidosis  #4 acute renal failure    Plan  #1 patient not appear to have improvement in neuro status at this time  #2 treated INR with FFP and vitamin K  #3 try to treat acidosis with bicarb  #4 discussed with family about options including dialysis and about next level of care. At this time the 2 daughters Suzie Jackson is when I spoke to. Have decided after discussing with patient's spouse who is also in the hospital that they want to make mom comfortable. I will start morphine and Ativan as needed and a scopolamine patch.   We will change to DNR CC status at around 4 PM when family is here we will start the process terminal extubation and allowed to be comfortable. I have updated the patient's hospital doctor and cardiologist as well as patient's nurse.   Electronically signed by Lake Sr MD on 1/12/2022 at 3:35 PM

## 2022-01-12 NOTE — PROGRESS NOTES
Today's plan:  Rewarmed, INR 8.9, WILL NEED HD catheter, will get FFP and vitamin k, WILL recommend to start IV hep if INR< 2.5  FOR MECHANICAL valve, ECHO is normal LVEF    Admit Date:  1/8/2022    Subjective:INTUBATED      Chief complaints on admission  Chief Complaint   Patient presents with    Cardiac Arrest         History of present illness:Celina is a 66 y. o.year old who  presents with had concerns including Cardiac Arrest.      Past medical history:    has a past medical history of Afib ([address] Anticoagulated on Coumadin, Atrial fibrillation and flutter ([address] Atrioventricular godwin re-entry tachycardia ([address] H/O echocardiogram, H/O prior ablation treatment, H/O rheumatic heart disease, H/O: CVA (cerebrovascular accident), History of mitral valve prosthesis, HTN (hypertension), Hx of cardiovascular stress test, Hx of echocardiogram, Hx of echocardiogram, Hyperlipemia, Mitral valve prolapse, S/P MVR (mitral valve repair), and VHD (valvular heart disease). Past surgical history:   has a past surgical history that includes Cardiac surgery; Cardiac valuve replacement; Tubal ligation; and Mitral valve replacement (2/11/98). Social History:   reports that she has quit smoking. She has never used smokeless tobacco. She reports that she does not drink alcohol and does not use drugs. Family history:  family history includes Heart Disease in her father. Allergies   Allergen Reactions    Adenosine     Pravachol [Pravastatin Sodium] Other (See Comments)     Muscle aches, stomach ache    Statins Other (See Comments)     Causes muscle aches.          Objective:   BP (!) 137/57   Pulse 99   Temp 98.4 °F (36.9 °C) (Core)   Resp 14   Ht 5' 7\" (1.702 m)   Wt 210 lb 15.7 oz (95.7 kg)   SpO2 (!) 88%   BMI 33.04 kg/m²       Intake/Output Summary (Last 24 hours) at 1/12/2022 1133  Last data filed at 1/12/2022 0818  Gross per 24 hour   Intake 5372.74 ml   Output 235 ml   Net 5137.74 ml     Physical Exam:  Constitutional:  Well developed, Well nourished, No acute distress, Non-toxic appearance. HENT:  Normocephalic, Atraumatic, Bilateral external ears normal, Oropharynx moist, No oral exudates, Nose normal. Neck- Normal range of motion, No tenderness, Supple, No stridor. Eyes:  PERRL, EOMI, Conjunctiva normal, No discharge. Respiratory:  Normal breath sounds, No respiratory distress, No wheezing, No chest tenderness. ,no use of accessory muscles, diaphragm movement is normal  Cardiovascular: (PMI) apex non displaced,no lifts no thrills, no s3,no s4, Normal heart rate, Normal rhythm, No murmurs, No rubs, No gallops. Carotid arteries pulse and amplitude are normal no bruit, no abdominal bruit noted ( normal abdominal aorta ausculation), femoral arteries pulse and amplitude are normal no bruit, pedal pulses are normal  GI:  Bowel sounds normal, Soft, No tenderness, No masses, No pulsatile masses. : External genitalia appear normal, No masses or lesions. No discharge. No CVA tenderness. Musculoskeletal:  Intact distal pulses, No edema, No tenderness, No cyanosis, No clubbing. Good range of motion in all major joints. No tenderness to palpation or major deformities noted. Back- No tenderness. Integument:  Warm, Dry, No erythema, No rash. Lymphatic:  No lymphadenopathy noted. Neurologic:  INTUBATED and sedated.      Medications:    dexamethasone  10 mg IntraVENous Daily    levofloxacin  500 mg IntraVENous Once    [START ON 1/13/2022] levofloxacin  250 mg IntraVENous Q24H    tocilizumab (ACTEMRA) IVPB  800 mg IntraVENous Once    magnesium sulfate  2,000 mg IntraVENous Once    valproate sodium (DEPACON) IVPB  500 mg IntraVENous Q8H    insulin lispro  0-6 Units SubCUTAneous Q4H    famotidine (PEPCID) injection  20 mg IntraVENous BID    warfarin (COUMADIN) daily dosing (placeholder)   Other RX Placeholder    chlorhexidine  15 mL Mouth/Throat BID      sodium chloride      IV infusion builder  vasopressin (Septic Shock) infusion 0.04 Units/min (01/12/22 0910)    norepinephrine 25 mcg/min (01/12/22 1102)    DOPamine Stopped (01/10/22 1848)    propofol 55 mcg/kg/min (01/12/22 1058)    fentaNYL 63 mcg/hr (01/12/22 0700)     sodium chloride, polyvinyl alcohol **AND** lubrifresh P.M., prochlorperazine, midazolam, potassium chloride, magnesium sulfate, ondansetron **OR** ondansetron    Lab Data:  CBC:   Recent Labs     01/10/22  0620 01/10/22  0621 01/10/22  1833 01/11/22  0450 01/12/22  0510   WBC 9.2  --   --  9.8 9.2   HGB 10.6*   < > 10.3* 8.7* 9.2*   HCT 33.1*   < > 30.0* 28.4* 31.5*   MCV 84.4  --   --  87.1 90.3     --   --  132* 172    < > = values in this interval not displayed. BMP:   Recent Labs     01/10/22  0620 01/10/22  0621 01/10/22  1220 01/10/22  1316 01/10/22  1833 01/11/22  0450 01/12/22  0510      < > 145   < > 147* 143 142   K 3.2*  3.4*   < > 3.5   < > 3.9 4.2 4.1   *   < > 116*  --   --  115* 113*   CO2 18*   < > 18*   < > 18* 17* 11*   PHOS 2.2*  --  2.8  --   --  5.2*  --    BUN 4*   < > 5*  --   --  9 21   CREATININE 0.5*   < > 0.5*   < > 0.9 1.3* 2.7*    < > = values in this interval not displayed. LIVER PROFILE:   Recent Labs     01/12/22  0900   AST 64*   ALT 12   BILIDIR 0.5*   BILITOT 0.5   ALKPHOS 59     PT/INR:   Recent Labs     01/11/22  0450 01/12/22  0558 01/12/22  0900   PROTIME 97.2* 114.1* 118.1*   INR 7.38* 8.65* 8.95*     APTT:   Recent Labs     01/12/22  0900   APTT 102.3*     BNP:  No results for input(s): BNP in the last 72 hours. TROPONIN: @TROPONINI:3@      Assessment:  66 y. o.year old who is admitted for          Plan:    1. Cardiac arrest:intubated, had vfib arrest, shocked at home and had amidarone drip in ED, however, had bradycardia and required dopamine, rewarming now hypothermia protocol,  Echo lVEF is normal  2. Afib: on coumadin, INR 8.9, hold couumadin AS ABOVE  3.  Metallic mitral valve:on coumadin, INR is supra therepeutic, continue to hold it. All labs, medications and tests reviewed, continue all other medications of all above medical condition listed as is.       Sam Frias MD, MD 1/12/2022 11:33 AM

## 2022-01-12 NOTE — PROGRESS NOTES
Family at the bedside. Abelino Brown RT notified that family to extubate patient.  ANN prayed at the bedside. Family educated on what to expect      [phone]- Pt extubated by Estrella Jacob. Jean Carlos Heath RN and this RN at bedside. Comfort care continued.

## 2022-01-12 NOTE — PROGRESS NOTES
Adrien Ferguson and [facility] on the floor checking in on the patient. They were updated on patient status. They would like to be notified of any change to the patients status.      Jose Ferguson [phone]  [facility] [phone]

## 2022-01-12 NOTE — PROGRESS NOTES
Returned Dopamine to [facility]. Drawer for propofol was empty when it said there was vials in it, had to go to other [facility] to obtain.

## 2022-01-12 NOTE — PROGRESS NOTES
pulmonary      SUBJECTIVE:  Intubated on vent     OBJECTIVE    VITALS:  BP (!) 137/57   Pulse 97   Temp 98.6 °F (37 °C) (Core)   Resp 18   Ht 5' 7\" (1.702 m)   Wt 210 lb 15.7 oz (95.7 kg)   SpO2 90%   BMI 33.04 kg/m²   HEAD AND FACE EXAM:  No throat injection, no active exudate,no thrush  NECK EXAM;No JVD, no masses, symmetrical  CHEST EXAM; Expansion equal and symmetrical, no masses  LUNG EXAM; Good breath sounds bilaterally. There are expiratory wheezes both lungs, there are crackles at both lung bases  CARDIOVASCULAR EXAM: Positive S1 and S2, no S3 or S4, no clicks ,no murmurs  RIGHT AND LEFT LOWER EXTRIMITY EXAM: No edema, no swelling, no inflamation            LABS   Lab Results   Component Value Date    WBC 9.2 01/12/2022    HGB 9.2 (L) 01/12/2022    HCT 31.5 (L) 01/12/2022    MCV 90.3 01/12/2022     01/12/2022     Lab Results   Component Value Date    CREATININE 1.3 (H) 01/11/2022    BUN 9 01/11/2022     01/11/2022    K 4.2 01/11/2022     (H) 01/11/2022    CO2 17 (L) 01/11/2022     Lab Results   Component Value Date    INR 8.65 (HH) 01/12/2022    PROTIME 114.1 (H) 01/12/2022          Lab Results   Component Value Date    PHOS 5.2 01/11/2022    PHOS 2.8 01/10/2022    PHOS 2.2 01/10/2022        Recent Labs     01/10/22  1833 01/11/22  0600 01/12/22  0600   PH 7.30* 7.26* 7.13*   PO2ART 87.2 94 50*   CMP7UNM 34.4 36.0 37.0   O2SAT 95.7* 96.1 82.7*         Wt Readings from Last 3 Encounters:   01/10/22 210 lb 15.7 oz (95.7 kg)   09/21/21 212 lb 9.6 oz (96.4 kg)   07/06/21 219 lb 11.2 oz (99.7 kg)        EXAMINATION:   ONE XRAY VIEW OF THE CHEST       1/12/2022 12:08 am       COMPARISON:   01/11/2022       HISTORY:   ORDERING SYSTEM PROVIDED HISTORY: ventilator management   TECHNOLOGIST PROVIDED HISTORY:   Reason for exam:->ventilator management       FINDINGS:   Endotracheal tube extends just above the level of the clavicles.  Nasogastric   tube is seen.  Status post median sternotomy.  Multifocal bilateral   heterogeneous pulmonary opacity is again seen, increased in the left   perihilar region as compared to prior.  Homogeneous opacity is seen again at   the left base.  No gross pneumothorax.  Cardiac and mediastinal silhouettes   are reflective of patient rotation.           Impression   Persistent multifocal bilateral airspace disease, increased in the left   perihilar region as compared to prior.       Persistent small left pleural effusion. ASSESMENT  Ac resp failure  S/p CPR  covid pneumonia  Superadded bact pneumonia        PLAN  1. cpm  2. Cont full vent support  3. Consult nephrology for met acidosis  4. Start levaquin  5. Start decadron  6.  Consult pharmacy for toci    1/12/2022  Manuela Mojica MD, M.D.

## 2022-01-12 NOTE — PROGRESS NOTES
[facility] contacted about patient expiring. We are free to release since [facility] will not be following this patient. [facility] referral # [id number].

## 2022-01-12 NOTE — PROGRESS NOTES
I spoke to patient's daughters earlier today and they have come to see patient as well as seen patient's spouse. After long discussion they decided they wanted to keep patient comfortable and withdraw care and I will place orders and make patient comfortable I informed the patient's hospital doctor as well as cardiology. Will start patient on morphine IV as needed as well as Ativan as needed scopolamine patch and withdraw ventilator first comfortable extubation when patient fully sedated most likely patient not arousing and allow pass away peacefully.   Family does not want aggressive measures or dialysis    I called and updated daughter lida and want be there when withdraw and told come down about 4pm

## 2022-01-12 NOTE — CONSULTS
Palliative care consult was placed. Cristina Hendrix has already placed orders for Terminal extubation. The  is present at bedside. Patient's spouse is a patient here and is being brought to bedside. I spoke with Rosalie Aly RN and she stated that they have what they need to proceed.  placed orders at 1532 and Palliative was consulted at [phone]. Will be available if they need assistance.

## 2022-01-12 NOTE — PROGRESS NOTES
Pt showing no gag, no pupil reflex, no movement to any kind of stimulus. Weaning off of propofol to check neruo status.

## 2022-01-12 NOTE — PROGRESS NOTES
[address] [facility]  consulted by Dr. Debbi Villa for monitoring and adjustment. Indication for treatment: Pneumonia  Goal trough: 15-20 mcg/mL    Pertinent Laboratory Values:   Temp Readings from Last 3 Encounters:   01/12/22 98.4 °F (36.9 °C) (Core)   07/07/21 98.4 °F (36.9 °C) (Oral)     Recent Labs     01/10/22  0620 01/10/22  1220 01/11/22  0450 01/12/22  0510   WBC 9.2  --  9.8 9.2   LACTATE 1.2 1.2  --   --      Recent Labs     01/10/22  1220 01/10/22  1316 01/10/22  1833 01/11/22  0450 01/12/22  0510   BUN 5*  --   --  9 21   CREATININE 0.5*   < > 0.9 1.3* 2.7*    < > = values in this interval not displayed. Estimated Creatinine Clearance: 22 mL/min (A) (based on SCr of 2.7 mg/dL (H)). Intake/Output Summary (Last 24 hours) at 1/12/2022 1234  Last data filed at 1/12/2022 0818  Gross per 24 hour   Intake 5372.74 ml   Output 235 ml   Net 5137.74 ml     Pertinent Cultures:  Date    Source    Results  1/12   MRSA Screen   Ordered   1/12   Resp Cx   Ordered     Vancomycin level:   TROUGH:  No results for input(s): VANCOTROUGH in the last 72 hours. RANDOM:  No results for input(s): VANCORANDOM in the last 72 hours. Assessment:  · WBC and temperature: WBC WNL/afebrile   · SCr, BUN, and urine output: KEVON  · Day(s) of therapy: 1   · Vancomycin concentration: to be collected     Plan:  · Vancomycin 1750mg x 1   · Intermittent dosing due to renal function  · Pharmacy will continue to monitor patient and adjust therapy as indicated    VANCOMYCIN CONCENTRATION SCHEDULED FOR 1/13 @0600    Thank you for the consult.   Jeyson Spence [facility]  1/12/2022 12:34 PM

## 2022-01-13 LAB
COMPONENT: NORMAL
CULTURE: ABNORMAL
CULTURE: ABNORMAL
Lab: ABNORMAL
SPECIMEN: ABNORMAL
STATUS: NORMAL
TRANSFUSION STATUS: NORMAL
UNIT DIVISION: 0
UNIT NUMBER: NORMAL

## 2022-01-13 NOTE — DISCHARGE SUMMARY
Yury Martinez 1950 [id number]  PCP:  Trent Artis MD    Admit date: 1/8/2022  Admitting Physician: Jorge Preston DO    Discharge date: 1/12/2021 Discharge Physician: Aurelio Canavan, MD      Reason for admission:   Chief Complaint   Patient presents with    Cardiac Arrest     Present on Admission:   Cardiac arrest [facility])   Severe hypoxic-ischemic encephalopathy       Discharge Diagnoses Include:  1. Ventricular fibrillation cardiac arrest  2. Shock  3. Possible anoxic brain injury  4. COVID-19 pneumonia  5. Supratherapeutic INR  6. Acute kidney injury with metabolic acidosis  7. Paroxysmal atrial fibrillation  8. Hypomagnesemia    Hospital Course:  Yury Martinez is a 66 y.o.  female with a reviewed and a contributory family history of heart disease and a PMH as stated above, who presents via EMS after witnessed cardiac arrest.  Patient was noted by  to slump over in her chair and he immediately called 911. He felt her pulse and did not find one. EMS arrived rather quickly as they live 3 blocks away. EMS noted patient was in V. fib arrest and was shocked and ROSC was obtained. Patient had 2 more episodes of cardiac arrest and was intubated in the field and upon arrival was in ROSC. Discussing with family and , patient has been having episodes of nausea, emesis, and nonbloody diarrhea for the past week. Her  has been suffering with the same illness. They have not been tested for Covid. Patient had not been having any shortness of breath or cough. Prior to today's event patient had not been noted to be slurring speech, have facial droop, had unilateral numbness or weakness, dizziness, or blurred vision. Otherwise patient has been in her normal state of health up until immediately prior. No history was obtained from patient as patient was intubated. Admitted as:  1.  V. fib cardiac arrest: Out of hospital cardiac arrest-status post ACLS/defibrillation. Intubated-vent management. ROSC achieved and on temperature targeted management. She was on amiodarone-discontinued because of bradycardia. Echo-normal EF. Pulmonary and cardiology consulted.     2. Shock: Cardiogenic versus sepsis. Echo-EF 50%. On Levophed and vasopressin drips.     3. Possible anoxic brain injury: Neurology consulted-difficult to assess while on sedatives. EEG ordered.     4.  Paroxysmal atrial fibrillation: Held anticoagulant while on TTM.    5.  COVID-pneumonia: Started on Decadron. Given Tocilizumab. On Levaquin and cefepime for possible bacterial pneumonia. Pulmonary consulted.     6. Supratherapeutic INR/metabolic MVR: Coumadin was held. LFT and DIC panel ordered. No evidence of active bleeding. To start heparin drip when INR was less than 2.5.     7.  Acute kidney injury/metabolic acidosis: Nephrology consulted. Started on bicarbonate drip. Patient may need hemodialysis-given FFP and vitamin K.     8. Hypomagnesemia: Replaced. However, family opted for [facility] and withdrawal of care. She was extubated and  at 16.55 hr on 2022. Pt was personally examined by me on the day of discharge with the following findings: Please, see my progress note on the day of discharge.     Procedures: None    Significant Diagnostic Studies at discharge:   CBC:   Lab Results   Component Value Date    WBC 9.2 2022    RBC 3.49 2022    HGB 9.2 2022    HCT 31.5 2022    MCV 90.3 2022    MCH 26.4 2022    MCHC 29.2 2022    RDW 16.3 2022     2022    MPV 10.2 2022     BMP:    Lab Results   Component Value Date     2022    K 4.1 2022     2022    CO2 11 2022    BUN 21 2022    LABALBU 2.5 2022    CREATININE 2.7 2022    CALCIUM 7.3 2022    GFRAA 21 2022    LABGLOM 17 2022    GLUCOSE 112 2022       Patient Instructions:     Medication List      ASK your doctor about these medications    amLODIPine 5 MG tablet  Commonly known as: NORVASC  Take 1 tablet by mouth daily     aspirin 81 MG tablet     Cetirizine HCl 10 MG Caps     citalopram 20 MG tablet  Commonly known as: CELEXA     docusate sodium 100 MG capsule  Commonly known as: COLACE     HYDROcodone-acetaminophen 5-325 MG per tablet  Commonly known as: NORCO     * metoprolol tartrate 25 MG tablet  Commonly known as: LOPRESSOR  Take 1 tablet by mouth 2 times daily     * metoprolol tartrate 50 MG tablet  Commonly known as: LOPRESSOR  Take 1 tablet by mouth 2 times daily     Vitamin B-12 2000 MCG Tbcr     Vitamin D 1000 units Caps capsule  Commonly known as: CHOLECALCIFEROL     warfarin 2 MG tablet  Commonly known as: COUMADIN         * This list has 2 medication(s) that are the same as other medications prescribed for you. Read the directions carefully, and ask your doctor or other care provider to review them with you. Code Status: Prior     Consults:   IP CONSULT TO CARDIOLOGY  IP CONSULT TO HOSPITALIST  IP CONSULT TO PHARMACY  IP CONSULT TO DIETITIAN  IP CONSULT TO PULMONOLOGY  IP CONSULT TO ENDOCRINOLOGY  IP CONSULT TO NEUROLOGY  IP CONSULT TO PHARMACY  IP CONSULT TO NEPHROLOGY  IP CONSULT TO PALLIATIVE CARE    Diet: Not applicable    Activity: Not applicable. Work:    Discharged Condition: Patient . Prognosis: Not applicable    Disposition: Patient . Follow-up with: Not applicable      Follow up labs: Not applicable       Discharge Physician Signed: Electronically signed by Mary Osborne MD on 2022 at 7:30 AM    The patient was seen and examined on day of discharge and this discharge summary is in conjunction with any daily progress note from day of discharge.   Time spent on discharge in the examination, evaluation, counseling and review of medications and discharge plan: >30 minutes

## 2024-03-18 NOTE — CONSULTS
"History  Chief Complaint   Patient presents with    Flank Pain     Reports \"numbing pain\" on R side x3 days. Denies urinary symptoms. Feels nauseous when pain comes and goes.     HPI    Patient is a 71 year old female with PMHx GERD, diverticulitis, prior SVT, presenting to the ED for evaluation of abdominal pain.  Patient states that she has had right abdominal discomfort for the past 3 days, described as a constant throbbing sensation, associated with nausea but no vomiting.  Patient denies fevers, chills, cough or congestion, chest pain, urinary complaints, history of kidney stones, flank pain, vaginal bleeding or discharge.  Patient states she does not have any history of abdominal surgeries.  Does not indicate that the pain is exacerbated with p.o. intake.    Prior to Admission Medications   Prescriptions Last Dose Informant Patient Reported? Taking?   Cholecalciferol 25 MCG (1000 UT) tablet  Self Yes No   Sig: Take 1,000 Units by mouth daily   Turmeric 500 MG CAPS  Self Yes No   Sig: Take 500 mg by mouth daily   Zinc 50 MG TABS  Self Yes No   Sig: Take 50 mg by mouth daily   albuterol (PROVENTIL HFA,VENTOLIN HFA) 90 mcg/act inhaler  Self Yes No   Sig: Inhale 2 puffs   ascorbic acid (VITAMIN C) 1000 MG tablet  Self Yes No   Sig: Take 1,000 mg by mouth daily   dorzolamide-timolol (COSOPT) 22.3-6.8 MG/ML ophthalmic solution  Self Yes No   ketorolac (ACULAR) 0.4 % SOLN  Self Yes No   metoprolol succinate (TOPROL-XL) 25 mg 24 hr tablet  Self Yes No   Sig: metoprolol succinate ER 25 mg tablet,extended release 24 hr   omeprazole (PriLOSEC) 20 mg delayed release capsule  Self Yes No   Sig: omeprazole 20 mg capsule,delayed release   pantoprazole (PROTONIX) 40 mg tablet   No No   Sig: TAKE ONE TABLET BY MOUTH TWICE A DAY.TAKE 1 TABLET 1/2 HOUR BEFORE BREAKFAST AND DINNER.   prednisoLONE acetate (PRED FORTE) 1 % ophthalmic suspension  Self Yes No   pyridoxine (VITAMIN B6) 50 mg tablet  Self Yes No   Sig: Take by mouth    " Subjective:   CHIEF COMPLAINT / HPI:  66year old female who coded at home,v.fib. acls performed. Pt wasw intubated and is on vent. She is currently on hyp[othermia protocol. She has extensive cardiac history      Past Medical History:  Past Medical History:   Diagnosis Date    Afib [facility])     ablation 1997    Anticoagulated on Coumadin     **PCP follows pt's PT/INRs, along w/prescribing her Coumadin. **    Atrial fibrillation and flutter ([facility] [address] 01/01/2005    On Coumadin.  Atrioventricular godwin re-entry tachycardia ([facility] [address]     H/O echocardiogram 10/26/2006    EF 45-50%. Mod to sev septal hypokinesis. Bi-leaflet ([facility] [facility]), mechanical prosthesis.  H/O prior ablation treatment 01/01/1997    Dr. Orion Schmidt H/O rheumatic heart disease     H/O: CVA (cerebrovascular accident)     History of mitral valve prosthesis     Metallic medtronic valve    HTN (hypertension)     Hx of cardiovascular stress test 09/30/2015    lexiscan-mild to moderate ischemia basal inferior and mid inferior,EF56%    Hx of echocardiogram 09/30/2015    EF. 45-50%. Moderate left atrial enlargement. Regional wall motion abnormality w/ probably mild reduction of LVSF. Normal sized abd aorta at 2.2cm. Mild aortic regurgitation.  Hx of echocardiogram 06/22/2021    50-55%. mechanical prosthetic valve is present with a meangradient of 2mmHg    Hyperlipemia     Mitral valve prolapse     S/P MVR (mitral valve repair) 12/23/1991    VHD (valvular heart disease) 01/01/1998       Past Surgical History:        Procedure Laterality Date    CARDIAC SURGERY      CARDIAC VALVE SURGERY      MITRAL VALVE REPLACEMENT  2/11/98    [employer] serial # [id number], model #     TUBAL LIGATION         Current Medications:    Current Facility-Administered Medications: 0.9 % sodium chloride infusion, , IntraVENous, Continuous  prochlorperazine (COMPAZINE) injection 10 mg, 10 mg, IntraVENous, Q6H PRN  famotidine (PEPCID) injection 20 mg, 20 mg, IntraVENous,   Facility-Administered Medications: None       Past Medical History:   Diagnosis Date    Anxiety     Arthritis     Colon polyp     Diverticulitis     GERD (gastroesophageal reflux disease)     Prolapsed uterus     SVT (supraventricular tachycardia)     last had 2 years ago; last saw cardiologist 9 months ago-had EKG- no problems at this visit.  10/30/23       Past Surgical History:   Procedure Laterality Date    CATARACT EXTRACTION, BILATERAL      COLONOSCOPY      EGD      RETINAL DETACHMENT SURGERY         Family History   Problem Relation Age of Onset    Cancer Mother     Colon cancer Mother     Brain cancer Father      I have reviewed and agree with the history as documented.    E-Cigarette/Vaping    E-Cigarette Use Never User      E-Cigarette/Vaping Substances    Nicotine No     THC No     CBD No     Flavoring No     Other No     Unknown No      Social History     Tobacco Use    Smoking status: Never    Smokeless tobacco: Never   Vaping Use    Vaping status: Never Used   Substance Use Topics    Alcohol use: Yes     Alcohol/week: 4.0 standard drinks of alcohol     Types: 4 Glasses of wine per week     Comment: social-weekend    Drug use: No        Review of Systems   All other systems reviewed and are negative.      Physical Exam  ED Triage Vitals [03/18/24 1406]   Temperature Pulse Respirations Blood Pressure SpO2   97.7 °F (36.5 °C) 89 18 (!) 184/81 99 %      Temp Source Heart Rate Source Patient Position - Orthostatic VS BP Location FiO2 (%)   Temporal Monitor -- -- --      Pain Score       5             Orthostatic Vital Signs  Vitals:    03/18/24 1406 03/18/24 1615 03/18/24 1730   BP: (!) 184/81 (!) 176/80 (!) 178/86   Pulse: 89 62 78       Physical Exam  Vitals and nursing note reviewed.   Constitutional:       General: She is not in acute distress.     Appearance: Normal appearance. She is well-developed and normal weight. She is not ill-appearing or toxic-appearing.   HENT:      Head: Normocephalic and  BID  fentaNYL (SUBLIMAZE) 1,000 mcg in sodium chloride 0.9% 100 mL infusion, 12.5-200 mcg/hr, IntraVENous, Continuous  midazolam PF (VERSED) injection 1 mg, 1 mg, IntraVENous, Q30 Min PRN  warfarin (COUMADIN) daily dosing (placeholder), , Other, RX Placeholder  potassium chloride 20 mEq/50 mL IVPB (Central Line), 20 mEq, IntraVENous, PRN  magnesium sulfate 1000 mg in dextrose 5% 100 mL IVPB, 1,000 mg, IntraVENous, PRN  insulin regular (HUMULIN R;NOVOLIN R) 100 Units in sodium chloride 0.9 % 100 mL infusion, 1-50 Units/hr, IntraVENous, Continuous  ondansetron (ZOFRAN-ODT) disintegrating tablet 4 mg, 4 mg, Oral, Q8H PRN **OR** ondansetron (ZOFRAN) injection 4 mg, 4 mg, IntraVENous, Q6H PRN  chlorhexidine (PERIDEX) 0.12 % solution 15 mL, 15 mL, Mouth/Throat, BID  propofol injection, 5-50 mcg/kg/min, IntraVENous, Continuous  heparin (porcine) injection 4,000 Units, 4,000 Units, IntraVENous, PRN  heparin (porcine) injection 2,000 Units, 2,000 Units, IntraVENous, PRN  heparin 25,000 units in dextrose 5% 250 mL (premix) infusion, 5-30 Units/kg/hr, IntraVENous, Continuous  DOPamine (INTROPIN) 400 mg in dextrose 5 % 250 mL infusion, 2-20 mcg/kg/min, IntraVENous, Continuous    Allergies   Allergen Reactions    Adenosine     Pravachol [Pravastatin Sodium] Other (See Comments)     Muscle aches, stomach ache    Statins Other (See Comments)     Causes muscle aches.        Social History:    Social History     Socioeconomic History    Marital status:      Spouse name: Not on file    Number of children: Not on file    Years of education: Not on file    Highest education level: Not on file   Occupational History    Not on file   Tobacco Use    Smoking status: Former Smoker    Smokeless tobacco: Never Used   Substance and Sexual Activity    Alcohol use: No     Alcohol/week: 0.0 standard drinks    Drug use: No    Sexual activity: Yes     Partners: Male     Comment:    Other Topics Concern    Not on file Social History Narrative    Not on file     Social Determinants of Health     Financial Resource Strain:     Difficulty of Paying Living Expenses: Not on file   Food Insecurity:     Worried About Running Out of Food in the Last Year: Not on file    Roderick of Food in the Last Year: Not on file   Transportation Needs:     Lack of Transportation (Medical): Not on file    Lack of Transportation (Non-Medical): Not on file   Physical Activity:     Days of Exercise per Week: Not on file    Minutes of Exercise per Session: Not on file   Stress:     Feeling of Stress : Not on file   Social Connections:     Frequency of Communication with Friends and Family: Not on file    Frequency of Social Gatherings with Friends and Family: Not on file    Attends Religion Services: Not on file    Active Member of [address] [place] [address] [employer] or Organizations: Not on file    Attends Club or Organization Meetings: Not on file    Marital Status: Not on file   Intimate Partner Violence:     Fear of Current or Ex-Partner: Not on file    Emotionally Abused: Not on file    Physically Abused: Not on file    Sexually Abused: Not on file   Housing Stability:     Unable to Pay for Housing in the Last Year: Not on file    Number of [place] in the Last Year: Not on file    Unstable Housing in the Last Year: Not on file       Family History:   Family History   Problem Relation Age of Onset    Heart Disease Father        Immunization:    There is no immunization history on file for this patient. REVIEW OF SYSTEMS:    CONSTITUTIONAL:  negative for fevers, chills, diaphoresis, activity change, appetite change, fatigue, night sweats and unexpected weight change.    EYES:  negative for blurred vision, eye discharge, visual disturbance and icterus  HEENT:  negative for hearing loss, tinnitus, ear drainage, sinus pressure, nasal congestion, epistaxis and snoring  RESPIRATORY:  See HPI  CARDIOVASCULAR:  negative for chest pain, palpitations, atraumatic.      Right Ear: External ear normal.      Left Ear: External ear normal.      Nose: Nose normal. No congestion.      Mouth/Throat:      Mouth: Mucous membranes are moist.      Pharynx: Oropharynx is clear.   Eyes:      Conjunctiva/sclera: Conjunctivae normal.   Cardiovascular:      Rate and Rhythm: Normal rate and regular rhythm.      Pulses: Normal pulses.      Heart sounds: Normal heart sounds. No murmur heard.  Pulmonary:      Effort: Pulmonary effort is normal. No respiratory distress.      Breath sounds: Normal breath sounds. No wheezing, rhonchi or rales.   Abdominal:      General: Abdomen is flat. Bowel sounds are normal. There is no distension.      Palpations: Abdomen is soft. There is no mass.      Tenderness: There is abdominal tenderness (RUQ and R mid abdomen near the umbilicus). There is no right CVA tenderness, left CVA tenderness, guarding or rebound.   Musculoskeletal:         General: No swelling or signs of injury. Normal range of motion.      Cervical back: Normal range of motion and neck supple. No tenderness.   Skin:     General: Skin is warm and dry.      Capillary Refill: Capillary refill takes less than 2 seconds.      Findings: No erythema.   Neurological:      General: No focal deficit present.      Mental Status: She is alert and oriented to person, place, and time.   Psychiatric:         Mood and Affect: Mood normal.         ED Medications  Medications   sodium chloride 0.9 % bolus 1,000 mL (0 mL Intravenous Stopped 3/18/24 1743)   ondansetron (ZOFRAN) injection 4 mg (4 mg Intravenous Given 3/18/24 1634)   aluminum-magnesium hydroxide-simethicone (MAALOX) oral suspension 30 mL (30 mL Oral Given 3/18/24 1631)   sucralfate (CARAFATE) tablet 1 g (1 g Oral Given 3/18/24 1631)   iohexol (OMNIPAQUE) 350 MG/ML injection (MULTI-DOSE) 100 mL (100 mL Intravenous Given 3/18/24 1725)   ketorolac (TORADOL) injection 15 mg (15 mg Intravenous Given 3/18/24 1827)   morphine injection 4 mg (4  exertional chest pressure/discomfort, edema, syncope  GASTROINTESTINAL:  negative for nausea, vomiting, diarrhea, constipation, blood in stool and abdominal pain  GENITOURINARY:  negative for frequency, dysuria and hematuria  HEMATOLOGIC/LYMPHATIC:  negative for easy bruising, bleeding and lymphadenopathy  ALLERGIC/IMMUNOLOGIC:  negative for recurrent infections, angioedema, anaphylaxis and drug reactions  ENDOCRINE:  negative for weight changes and diabetic symptoms including polyuria, polydipsia and polyphagia    MUSCULOSKELETAL:  negative for  pain, joint swelling, decreased range of motion and muscle weakness  NEUROLOGICAL:  negative for headaches, slurred speech, unilateral weakness  PSYCHIATRIC/BEHAVIORAL: negative for hallucinations, behavioral problems, confusion and agitation. Objective:   PHYSICAL EXAM:      VITALS:    Vitals:    01/09/22 1221 01/09/22 1230 01/09/22 1300 01/09/22 1330   BP:       Pulse: 61 72 72 61   Resp:  16 16 15   Temp:  (!) 91.4 °F (33 °C) (!) 91.4 °F (33 °C) (!) 91.4 °F (33 °C)   TempSrc:  Core Core Core   SpO2:  100% 100% 100%   Weight:             CONSTITUTIONAL:  awake, alert, cooperative, no apparent distress, and appears stated age  NECK:  Supple, symmetrical, trachea midline, no adenopathy, thyroid symmetric, not enlarged and no tenderness  CHEST: Chest expansion equal and symmetrical, no intercostal retraction. LUNGS:  no increased work of breathing, has expiratory wheezes both lungs, no crackles. CARDIOVASCULAR: S1 and S2, no edema and no JVD  ABDOMEN:  normal bowel sounds, non-distended and no masses palpated, and no tenderness to palpation. No hepatospleenomegaly  LYMPHADENOPATHY:  no axillary or supraclavicular adenopathy. No cervical adnenopathy  PSYCHIATRIC: Oriented to person place and time. No obvious depression or anxiety. MUSCULOSKELETAL: No obvious misalignment or effusion of the joints. No clubbing, cyanosis of the digits.   RIGHT AND LEFT LOWER EXTREMITIES: mg Intravenous Given 3/18/24 1827)       Diagnostic Studies  Results Reviewed       Procedure Component Value Units Date/Time    Urine Microscopic [[id number]]  (Normal) Collected: 03/18/24 1744    Lab Status: Final result Specimen: Urine, Clean Catch Updated: 03/18/24 1803     RBC, UA 1-2 /hpf      WBC, UA None Seen /hpf      Epithelial Cells Occasional /hpf      Bacteria, UA Occasional /hpf     Urine Macroscopic, POC [[id number]]  (Abnormal) Collected: 03/18/24 1744    Lab Status: Final result Specimen: Urine Updated: 03/18/24 1746     Color, UA Yellow     Clarity, UA Clear     pH, UA 5.5     Leukocytes, UA Negative     Nitrite, UA Negative     Protein, UA Negative mg/dl      Glucose, UA Negative mg/dl      Ketones, UA Trace mg/dl      Urobilinogen, UA 0.2 E.U./dl      Bilirubin, UA Negative     Occult Blood, UA Trace     Specific Gravity, UA 1.010    Narrative:      CLINITEK RESULT    Lipase [[id number]]  (Normal) Collected: 03/18/24 1633    Lab Status: Final result Specimen: Blood from Arm, Left Updated: 03/18/24 1705     Lipase 35 u/L     Basic metabolic panel [[id number]] Collected: 03/18/24 1633    Lab Status: Final result Specimen: Blood from Arm, Left Updated: 03/18/24 1705     Sodium 138 mmol/L      Potassium 3.9 mmol/L      Chloride 104 mmol/L      CO2 26 mmol/L      ANION GAP 8 mmol/L      BUN 14 mg/dL      Creatinine 0.80 mg/dL      Glucose 91 mg/dL      Calcium 9.5 mg/dL      eGFR 73 ml/min/1.73sq m     Narrative:      National Kidney Disease Foundation guidelines for Chronic Kidney Disease (CKD):     Stage 1 with normal or high GFR (GFR > 90 mL/min/1.73 square meters)    Stage 2 Mild CKD (GFR = 60-89 mL/min/1.73 square meters)    Stage 3A Moderate CKD (GFR = 45-59 mL/min/1.73 square meters)    Stage 3B Moderate CKD (GFR = 30-44 mL/min/1.73 square meters)    Stage 4 Severe CKD (GFR = 15-29 mL/min/1.73 square meters)    Stage 5 End Stage CKD (GFR <15 mL/min/1.73 square meters)  Note: GFR calculation is  No edema, no inflammation, no tenderness. SKIN:  normal skin color, texture, turgor and no redness, warmth, or swelling. No palpable nodules    DATA:       EXAMINATION:   CTA OF THE CHEST 1/9/2022 2:50 am       TECHNIQUE:   CTA of the chest was performed after the administration of intravenous   contrast.  Multiplanar reformatted images are provided for review.  MIP   images are provided for review. Dose modulation, iterative reconstruction,   and/or weight based adjustment of the mA/kV was utilized to reduce the   radiation dose to as low as reasonably achievable.       COMPARISON:   None.       HISTORY:   ORDERING SYSTEM PROVIDED HISTORY: Cardiac arrest   TECHNOLOGIST PROVIDED HISTORY:   Reason for exam:->Cardiac arrest   Decision Support Exception - unselect if not a suspected or confirmed   emergency medical condition->Emergency Medical Condition (MA)   Reason for Exam: Cardiac arrest       FINDINGS:   Pulmonary Arteries: No large or central pulmonary embolism.  Respiratory   motion artifact degrades some of the mid and distal branches.  No definite   pulmonary embolism is identified. .       Mediastinum: No thoracic aortic aneurysm.  The heart is enlarged with   dilatation of the right atrium.  There is no pericardial effusion or   mediastinal hematoma.  Cluster of calcifications/lymph nodes in the right   paratracheal region are noted.  Additional small calcified lymph nodes in the   subcarinal region and right hilum.       Lungs/pleura: Pulmonary emphysematous changes and atelectatic changes are   present with prominent motion artifact.  There is dependent atelectasis in   the posterior lung bases but no significant pleural effusion and no   pneumothorax.  Several 3-5 mm nodules without calcification are present in   the right upper lobe and apex.  Areas in the mid and lower chest have   degraded evaluation for nodularity due to the motion artifact.  Some linear   scarring and small calcified granulomas are accurate only with a steady state creatinine    Hepatic function panel [[id number]]  (Abnormal) Collected: 03/18/24 1633    Lab Status: Final result Specimen: Blood from Arm, Left Updated: 03/18/24 1705     Total Bilirubin 1.19 mg/dL      Bilirubin, Direct 0.17 mg/dL      Alkaline Phosphatase 60 U/L      AST 16 U/L      ALT 12 U/L      Total Protein 7.1 g/dL      Albumin 4.2 g/dL     CBC and differential [[id number]] Collected: 03/18/24 1633    Lab Status: Final result Specimen: Blood from Arm, Left Updated: 03/18/24 1654     WBC 4.97 Thousand/uL      RBC 4.61 Million/uL      Hemoglobin 13.7 g/dL      Hematocrit 41.2 %      MCV 89 fL      MCH 29.7 pg      MCHC 33.3 g/dL      RDW 12.6 %      MPV 9.6 fL      Platelets 218 Thousands/uL      nRBC 0 /100 WBCs      Neutrophils Relative 47 %      Immature Grans % 0 %      Lymphocytes Relative 38 %      Monocytes Relative 10 %      Eosinophils Relative 4 %      Basophils Relative 1 %      Neutrophils Absolute 2.34 Thousands/µL      Absolute Immature Grans 0.01 Thousand/uL      Absolute Lymphocytes 1.90 Thousands/µL      Absolute Monocytes 0.48 Thousand/µL      Eosinophils Absolute 0.18 Thousand/µL      Basophils Absolute 0.06 Thousands/µL                    CT abdomen pelvis with contrast   Final Result by E. Alec Schoenberger, MD (03/18 1814)      No acute intra-abdominal pathology.         Workstation performed: [id number]         US right upper quadrant    (Results Pending)         Procedures  Procedures      ED Course  ED Course as of 03/18/24 1849   Mon Mar 18, 2024   1825 On re-evaluation, patient continues to note RUQ discomfort. CT does not show any acute pathology. Will order US RUQ. EKG ordered as well. Presentation does not seem cardiac in nature or suspicious for ACS, but will look for ischemia on EKG.   1826 Patient given Toradol and Morphine for pain control.   1846 EKG: NSR at 68 bpm, normal axis, normal intervals, no acute ST-T changes as interpreted by me        Patient is a 71-year-old female presenting to the ED for evaluation of right-sided abdominal pain for the past 3 days.  History and clinical exam documented above.  Differential includes but is not limited to gastritis, acid reflux, biliary colic, cholecystitis, pancreatitis, appendicitis, colitis, urinary tract infection, kidney stone.  Will do abdominal labs and a CT abdomen pelvis to rule out above.  Patient given fluids, GI cocktail, and Zofran for symptom control.    Reviewed diagnostics with patient at the bedside.  There is no evidence of urinary tract infection.  Her CBC does not show any significant leukocytosis, and BMP is normal.  Hepatic function panel significant for only mildly elevation of total bilirubin, but direct is fine.    See re-evaluation above. Care overturned to Dr. Asher pending RUQ US and ultimate disposition.                                Medical Decision Making  Amount and/or Complexity of Data Reviewed  Labs: ordered.  Radiology: ordered.    Risk  OTC drugs.  Prescription drug management.          Disposition  Final diagnoses:   RUQ abdominal pain     Time reflects when diagnosis was documented in both MDM as applicable and the Disposition within this note       Time User Action Codes Description Comment    3/18/2024  6:42 PM Christopher Maravilla Add [R10.11] RUQ abdominal pain           ED Disposition       None          Follow-up Information       Follow up With Specialties Details Why Contact Info Additional Information    [facility]   [address] [place] [id number] [phone] [facility] [place], [address] [place] [address], [place], [place], [id number] [phone]            Patient's Medications   Discharge Prescriptions    ALUMINUM-MAGNESIUM HYDROXIDE 200-200 MG/5ML SUSPENSION    Take 15 mL by mouth every 6 (six) hours as needed for heartburn       Start Date:  present in the periphery of right   and left upper lobes to apices.       Upper Abdomen: IVC as high-density contrast from the lower approach   injection.  Nasogastric tube is within the stomach.  There is no free air   free fluid in the upper abdomen.  Epicardial leads are present.  Endotracheal   tube is also acceptable.       Soft Tissues/Bones: Sternotomy wires and chronic deformity of the sternum. Degenerative changes in the spine.  There are nondisplaced fractures in the   anterior left 3rd, 4th and possibly 5th rib.  Questionable deformity at the   anterior junction of the right 4th and 5th rib.           Impression   1.  No hemothorax, pneumothorax, or large area of consolidation.  There are   fractures in the anterior left 3rd through possibly 5th rib and questionably   at the right 4th and 5th rib.       2.  No large or central pulmonary arterial embolism.  Respiratory motion   degrades some of the mid to distal branches without a convincing evidence for   pulmonary embolism.       3.  Dilated right atrium       4.  Old granulomatous disease with calcified and noncalcified nodules in the   upper lobes.  Evaluation in the lower lungs is degraded by the motion   artifact.  Comparison with any old outside studies would be beneficial.  If   no prior studies could have follow-up chest CT for full evaluation once the   patient is stabilized and over the acute issues.       RECOMMENDATIONS:   Unavailable             Order History    Open Order Details           abg and other labs reviewed                Assessment:     1. Ac hypoxemic resp failure  2. S/p CPR  3. H/o mitral valve reolacement  4. covid positive          Plan:     1. Vent management  2. Vent protocol  3. Bd rx  4. Stress ulcer prophylaxis  5. Cont hypothermia protocol  6. Pressors as needed  7. Neurological assesment after hypothermia protocol is over  8.  Thanks will follow 3/18/2024 End Date: --       Order Dose: 15 mL       Quantity: 355 mL    Refills: 0    ONDANSETRON (ZOFRAN) 4 MG TABLET    Take 1 tablet (4 mg total) by mouth every 6 (six) hours       Start Date: 3/18/2024 End Date: --       Order Dose: 4 mg       Quantity: 12 tablet    Refills: 0         PDMP Review       None             ED Provider  Attending physically available and evaluated Ileana Paz. I managed the patient along with the ED Attending.    Electronically Signed by           Christopher Maravilla DO  03/18/24 [id number]